# Patient Record
Sex: MALE | Race: WHITE | NOT HISPANIC OR LATINO | Employment: OTHER | URBAN - METROPOLITAN AREA
[De-identification: names, ages, dates, MRNs, and addresses within clinical notes are randomized per-mention and may not be internally consistent; named-entity substitution may affect disease eponyms.]

---

## 2017-01-04 PROBLEM — D22.5 MELANOCYTIC NEVI OF TRUNK: Status: ACTIVE | Noted: 2017-01-04

## 2017-01-10 ENCOUNTER — HOSPITAL ENCOUNTER (OUTPATIENT)
Dept: LAB | Facility: MEDICAL CENTER | Age: 82
End: 2017-01-10
Attending: NURSE PRACTITIONER
Payer: COMMERCIAL

## 2017-01-10 LAB
INR PPP: 2.4 (ref 0.87–1.13)
PROTHROMBIN TIME: 26.9 SEC (ref 12–14.6)

## 2017-01-10 PROCEDURE — 85610 PROTHROMBIN TIME: CPT

## 2017-01-10 PROCEDURE — 36415 COLL VENOUS BLD VENIPUNCTURE: CPT

## 2017-01-11 ENCOUNTER — ANTICOAGULATION MONITORING (OUTPATIENT)
Dept: VASCULAR LAB | Facility: MEDICAL CENTER | Age: 82
End: 2017-01-11

## 2017-01-11 DIAGNOSIS — Z86.73 HX-TIA (TRANSIENT ISCHEMIC ATTACK): ICD-10-CM

## 2017-01-11 DIAGNOSIS — I48.0 PAROXYSMAL ATRIAL FIBRILLATION (HCC): ICD-10-CM

## 2017-01-11 NOTE — PROGRESS NOTES
OP Anticoagulation Telephone Note      Date: 1/11/2017  Anticoagulation Summary as of 1/11/2017     INR goal 2.0-3.0   Selected INR 2.40 (1/10/2017)   Maintenance plan 7.5 mg (5 mg x 1.5) on Mon, Wed, Fri; 5 mg (5 mg x 1) all other days   Weekly total 42.5 mg   No change documented Theresa Amanda, Med Ass't   Plan last modified Jean Ayala PHARMD (11/12/2015)   Next INR check 2/14/2017   Target end date Indefinite    Indications   Paroxysmal atrial fibrillation (HCC) [I48.0]  Hx-TIA (transient ischemic attack) [Z86.73]         Anticoagulation Episode Summary     INR check location Outside Lab    Preferred lab     Send INR reminders to     [facility]      Anticoagulation Care Providers     Provider Role Specialty Phone number    Taz Jc M.D. Referring Cardiology [phone]    Jean Ayala, AZAM Responsible      COLIN [email or web address]. Responsible          Anticoagulation Patient Findings   Negatives Missed Doses, Extra Doses, Medication Changes, Antibiotic Use, Diet Changes, Dental/Other Procedures, Hospitalization, Bleeding Gums, Nose Bleeds, Blood in Urine, Blood in Stool, Any Bruising, Other Complaints      Plan:  Spoke with patient on the phone. Patient is therapeutic today. Patient denies any changes in medications or diet. Patient denies any signs or symptoms of bleeding or clotting. Instructed patient to call clinic if any unusual bleeding or bruising occurs. Will continue dosing as outlined above. Will follow-up with patient in 5 weeks.    Theresa Amanda, Medical Assistant    1/20/2017    Concur with plan outlined above    Jean Ayala PharmD

## 2017-01-18 PROBLEM — D49.2 NEOPLASM OF UNSPECIFIED BEHAVIOR OF BONE, SOFT TISSUE, AND SKIN: Status: RESOLVED | Noted: 2017-01-04 | Resolved: 2017-01-18

## 2017-01-23 ENCOUNTER — HOSPITAL ENCOUNTER (OUTPATIENT)
Dept: LAB | Facility: MEDICAL CENTER | Age: 82
End: 2017-01-23
Attending: INTERNAL MEDICINE
Payer: COMMERCIAL

## 2017-01-23 DIAGNOSIS — I10 ESSENTIAL HYPERTENSION: ICD-10-CM

## 2017-01-23 DIAGNOSIS — N18.3 CKD (CHRONIC KIDNEY DISEASE), STAGE 3 (MODERATE): ICD-10-CM

## 2017-01-23 LAB
ANION GAP SERPL CALC-SCNC: 10 MMOL/L (ref 0–11.9)
BUN SERPL-MCNC: 35 MG/DL (ref 8–22)
CALCIUM SERPL-MCNC: 9.4 MG/DL (ref 8.5–10.5)
CHLORIDE SERPL-SCNC: 108 MMOL/L (ref 96–112)
CO2 SERPL-SCNC: 23 MMOL/L (ref 20–33)
CREAT SERPL-MCNC: 1.63 MG/DL (ref 0.5–1.4)
GLUCOSE SERPL-MCNC: 113 MG/DL (ref 65–99)
POTASSIUM SERPL-SCNC: 4.3 MMOL/L (ref 3.6–5.5)
SODIUM SERPL-SCNC: 141 MMOL/L (ref 135–145)

## 2017-01-23 PROCEDURE — 36415 COLL VENOUS BLD VENIPUNCTURE: CPT

## 2017-01-23 PROCEDURE — 80048 BASIC METABOLIC PNL TOTAL CA: CPT

## 2017-01-25 ENCOUNTER — OFFICE VISIT (OUTPATIENT)
Dept: MEDICAL GROUP | Facility: CLINIC | Age: 82
End: 2017-01-25
Payer: COMMERCIAL

## 2017-01-25 VITALS
HEART RATE: 79 BPM | DIASTOLIC BLOOD PRESSURE: 70 MMHG | SYSTOLIC BLOOD PRESSURE: 120 MMHG | RESPIRATION RATE: 18 BRPM | TEMPERATURE: 97.9 F | BODY MASS INDEX: 28.68 KG/M2 | WEIGHT: 168 LBS | OXYGEN SATURATION: 93 % | HEIGHT: 64 IN

## 2017-01-25 DIAGNOSIS — N28.89 RENAL MASS, LEFT: ICD-10-CM

## 2017-01-25 DIAGNOSIS — I10 ESSENTIAL HYPERTENSION: ICD-10-CM

## 2017-01-25 DIAGNOSIS — R73.9 HYPERGLYCEMIA: ICD-10-CM

## 2017-01-25 DIAGNOSIS — I48.0 PAROXYSMAL ATRIAL FIBRILLATION (HCC): ICD-10-CM

## 2017-01-25 DIAGNOSIS — N18.3 CKD (CHRONIC KIDNEY DISEASE), STAGE 3 (MODERATE): ICD-10-CM

## 2017-01-25 DIAGNOSIS — E55.9 VITAMIN D DEFICIENCY: ICD-10-CM

## 2017-01-25 PROCEDURE — G8432 DEP SCR NOT DOC, RNG: HCPCS | Performed by: INTERNAL MEDICINE

## 2017-01-25 PROCEDURE — G8420 CALC BMI NORM PARAMETERS: HCPCS | Performed by: INTERNAL MEDICINE

## 2017-01-25 PROCEDURE — 4040F PNEUMOC VAC/ADMIN/RCVD: CPT | Mod: 8P | Performed by: INTERNAL MEDICINE

## 2017-01-25 PROCEDURE — 99214 OFFICE O/P EST MOD 30 MIN: CPT | Performed by: INTERNAL MEDICINE

## 2017-01-25 PROCEDURE — 1101F PT FALLS ASSESS-DOCD LE1/YR: CPT | Performed by: INTERNAL MEDICINE

## 2017-01-25 PROCEDURE — G8484 FLU IMMUNIZE NO ADMIN: HCPCS | Performed by: INTERNAL MEDICINE

## 2017-01-25 PROCEDURE — 1036F TOBACCO NON-USER: CPT | Performed by: INTERNAL MEDICINE

## 2017-01-25 RX ORDER — HYDROCHLOROTHIAZIDE 25 MG/1
25 TABLET ORAL DAILY
COMMUNITY
End: 2017-01-25

## 2017-01-25 ASSESSMENT — PATIENT HEALTH QUESTIONNAIRE - PHQ9: CLINICAL INTERPRETATION OF PHQ2 SCORE: 0

## 2017-01-26 NOTE — PROGRESS NOTES
Subjective:  Primitivo is a 89 y.o. male with the following   Past Medical History   Diagnosis Date   • TIA (transient ischemic attack)    • Other dyspnea and respiratory abnormality    • Atrioventricular block, complete (CMS-HCC)    • Bell's palsy    • Personal history of venous thrombosis and embolism    • Personal history of malignant neoplasm of prostate    • Anticoagulation monitoring, special range    • HTN    • Sleep apnea      Cannot use CPAP machine.   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, radiation   • Cancer (CMS-HCC) 1991     prostate, surgery   • Other malignant lymphomas, unspecified site, extranodal and solid organ sites    • Paroxysmal atrial fibrillation (CMS-HCC)       Family History   Problem Relation Age of Onset   • Heart Disease Mother    • Heart Attack Mother    • Heart Disease Father    • Heart Attack Father      The patient is on the following medications:   Current outpatient prescriptions:   •  warfarin (COUMADIN) 5 MG Tab, Take 1 Tab by mouth every day., Disp: 100 Tab, Rfl: 2  •  losartan (COZAAR) 25 MG Tab, Take 1 Tab by mouth every day., Disp: 90 Tab, Rfl: 3  •  diltiazem CD (CARDIZEM CD) 240 MG CAPSULE SR 24 HR, Take 1 Cap by mouth every day., Disp: 90 Cap, Rfl: 3  •  vitamin D (CHOLECALCIFEROL) 1000 UNIT TABS, Take 4,000 Units by mouth every day., Disp: , Rfl:     HPI; Patient is here today for follow-up visit regarding his recent labs, currently on diltiazem and Coumadin for atrial fibrillation, history of pacemaker placement denies having palpitation, chest pain or bleeding, on  losartan and diltiazem  for hypertension denies having shortness of breath or fatigue, on vitamin D supplements for vitamin D deficiency denies having back pain. Patient also has had history of left renal mass diagnosed several years ago , preferred no intervention, patient remains  "asymptomatic.  ROS:  See HPI    Blood pressure 120/70, pulse 79, temperature 36.6 °C (97.9 °F), resp. rate 18, height 1.626 m (5' 4.02\"), weight 76.204 kg (168 lb), SpO2 93 %.on RA  Objective:  Patient is well appearing and in no acute distress.  Pharynx is clear.  Neck is soft and supple with no cervical or supraclavicular lymphadenopathy, thyromegaly or masses, no JVD.  Lungs clear to auscultation bilaterally with normal respiratory effort. Abdomen soft, non-tender on palpation,not distended. Heart regular rate and rhythm without murmur. Extremities without any clubbing, cyanosis, or edema.    Assessment and Plan:  1. Atrial fibrillation/ pacemaker placement; rate and rhythm control , on diltiazem 240 mg q. daily, currently is on Coumadin denies any bleeding, followup INR ;        Ref. Range 10/26/2016 13:57 11/30/2016 14:30 1/10/2017 13:53   PT Latest Ref Range: 12.0-14.6 sec 27.2 (H) 29.7 (H) 26.9 (H)   INR Latest Ref Range: 0.87-1.13  2.43 (H) 2.72 (H) 2.40 (H)       2. Hypertension ; blood pressure is stable on diltiazem 240 mg q. Daily and losartan 25 mg daily.       3.Left renal mass and lung nodules; diagnosed several years ago , patient  decided against any intervention, currently is asymptomatic.       4. CKD ; follow creatinine ,GFR;    Ref. Range 2/22/2016 11:59 7/19/2016 09:54 1/23/2017 13:47   Creatinine Latest Ref Range: 0.50-1.40 mg/dL 1.53 (H) 1.45 (H) 1.63 (H)   GFR If  Latest Ref Range: >60 mL/min/1.73 m 2 51 (A) 55 (A) 48 (A)   GFR If Non  Latest Ref Range: >60 mL/min/1.73 m 2 43 (A) 45 (A) 39 (A)         5. Vitamin D deficiency; on OTC vitamin D3- 4000 units q. daily .       6. Hyperglycemia;    Ref. Range 1/23/2017 13:47   Glucose Latest Ref Range: 65-99 mg/dL 113 (H)         Plan:      Patient is advised regarding preventive and supportive care, diet and lifestyle modification, daily walking ,exercise , increase fluid intake continue current medications and " monitor labs.   Please note that this dictation was created using voice recognition software. I have worked with consultants from the vendor as well as technical experts from [facility] to optimize the interface. I have made every reasonable attempt to correct obvious errors, but I expect that there are errors of grammar and possibly content that I did not discover before finalizing the note.

## 2017-02-15 ENCOUNTER — APPOINTMENT (OUTPATIENT)
Dept: LAB | Facility: MEDICAL CENTER | Age: 82
End: 2017-02-15
Attending: NURSE PRACTITIONER
Payer: COMMERCIAL

## 2017-02-15 DIAGNOSIS — I48.0 PAROXYSMAL ATRIAL FIBRILLATION (HCC): ICD-10-CM

## 2017-02-15 LAB
INR PPP: 2.69 (ref 0.87–1.13)
PROTHROMBIN TIME: 29.4 SEC (ref 12–14.6)

## 2017-02-15 PROCEDURE — 36415 COLL VENOUS BLD VENIPUNCTURE: CPT

## 2017-02-15 PROCEDURE — 85610 PROTHROMBIN TIME: CPT

## 2017-02-17 ENCOUNTER — ANTICOAGULATION MONITORING (OUTPATIENT)
Dept: VASCULAR LAB | Facility: MEDICAL CENTER | Age: 82
End: 2017-02-17

## 2017-02-17 DIAGNOSIS — Z86.73 HX-TIA (TRANSIENT ISCHEMIC ATTACK): ICD-10-CM

## 2017-02-17 DIAGNOSIS — I48.0 PAROXYSMAL ATRIAL FIBRILLATION (HCC): ICD-10-CM

## 2017-02-17 NOTE — PROGRESS NOTES
OP Anticoagulation Telephone Note    Date: 2/17/2017  Anticoagulation Summary as of 2/17/2017     INR goal 2.0-3.0   Selected INR 2.69 (2/15/2017)   Maintenance plan 7.5 mg (5 mg x 1.5) on Mon, Wed, Fri; 5 mg (5 mg x 1) all other days   Weekly total 42.5 mg   No change documented Rod Galvan Ass't   Plan last modified Jean Ayala PHARMD (11/12/2015)   Next INR check 3/22/2017   Target end date Indefinite    Indications   Paroxysmal atrial fibrillation (CMS-HCC) [I48.0]  Hx-TIA (transient ischemic attack) [Z86.73]         Anticoagulation Episode Summary     INR check location Outside Lab    Preferred lab     Send INR reminders to     [facility]      Anticoagulation Care Providers     Provider Role Specialty Phone number    Taz Jc M.D. Referring Cardiology [phone]    Jean Ayala PHARMD Responsible      Dora Root N.P. Responsible          Anticoagulation Patient Findings   Negatives Missed Doses, Extra Doses, Medication Changes, Antibiotic Use, Diet Changes, Dental/Other Procedures, Hospitalization, Bleeding Gums, Nose Bleeds, Blood in Urine, Blood in Stool, Any Bruising, Other Complaints      Plan:  Spoke with patient's daughter on the phone. Patient is therapeutic today. Patient denies any changes in medications or diet. Patient denies any signs or symptoms of bleeding or clotting. Instructed patient to call clinic if any unusual bleeding or bruising occurs. Will continue dosing as outlined above. Will follow-up with patient in 5 weeks.    Theresa Amanda, Medical Assistant    2/24/2017    Concur with plan outlined above    Jean Ayala PharmD

## 2017-03-23 ENCOUNTER — HOSPITAL ENCOUNTER (OUTPATIENT)
Dept: LAB | Facility: MEDICAL CENTER | Age: 82
End: 2017-03-23
Attending: INTERNAL MEDICINE
Payer: COMMERCIAL

## 2017-03-23 ENCOUNTER — HOSPITAL ENCOUNTER (OUTPATIENT)
Dept: LAB | Facility: MEDICAL CENTER | Age: 82
End: 2017-03-23
Attending: NURSE PRACTITIONER
Payer: COMMERCIAL

## 2017-03-23 DIAGNOSIS — I48.0 PAROXYSMAL ATRIAL FIBRILLATION (HCC): ICD-10-CM

## 2017-03-23 LAB
INR PPP: 2.63 (ref 0.87–1.13)
PROTHROMBIN TIME: 28.9 SEC (ref 12–14.6)

## 2017-03-23 PROCEDURE — 85610 PROTHROMBIN TIME: CPT

## 2017-03-23 PROCEDURE — 36415 COLL VENOUS BLD VENIPUNCTURE: CPT

## 2017-05-08 DIAGNOSIS — Z79.899 MEDICATION MANAGEMENT: ICD-10-CM

## 2017-05-08 RX ORDER — WARFARIN SODIUM 5 MG/1
5 TABLET ORAL DAILY
Qty: 100 TAB | Refills: 2 | Status: SHIPPED | OUTPATIENT
Start: 2017-05-08 | End: 2017-12-15 | Stop reason: SDUPTHER

## 2017-05-12 ENCOUNTER — HOSPITAL ENCOUNTER (OUTPATIENT)
Dept: LAB | Facility: MEDICAL CENTER | Age: 82
End: 2017-05-12
Attending: NURSE PRACTITIONER
Payer: COMMERCIAL

## 2017-05-12 LAB
INR PPP: 2.77 (ref 0.87–1.13)
PROTHROMBIN TIME: 30.1 SEC (ref 12–14.6)

## 2017-05-12 PROCEDURE — 85610 PROTHROMBIN TIME: CPT

## 2017-05-12 PROCEDURE — 36415 COLL VENOUS BLD VENIPUNCTURE: CPT

## 2017-05-16 ENCOUNTER — ANTICOAGULATION MONITORING (OUTPATIENT)
Dept: VASCULAR LAB | Facility: MEDICAL CENTER | Age: 82
End: 2017-05-16

## 2017-05-16 DIAGNOSIS — I48.0 PAROXYSMAL ATRIAL FIBRILLATION (HCC): ICD-10-CM

## 2017-05-16 DIAGNOSIS — Z86.73 HX-TIA (TRANSIENT ISCHEMIC ATTACK): ICD-10-CM

## 2017-05-16 NOTE — PROGRESS NOTES
OP Anticoagulation Telephone Note    Date: 5/16/2017  Anticoagulation Summary as of 5/16/2017     INR goal 2.0-3.0   Selected INR 2.77 (5/12/2017)   Maintenance plan 7.5 mg (5 mg x 1.5) on Mon, Wed, Fri; 5 mg (5 mg x 1) all other days   Weekly total 42.5 mg   No change documented Theresa Amanda, Med Ass't   Plan last modified Jean Ayala PHARMD (11/12/2015)   Next INR check 6/16/2017   Target end date Indefinite    Indications   Paroxysmal atrial fibrillation (CMS-HCC) [I48.0]  Hx-TIA (transient ischemic attack) [Z86.73]         Anticoagulation Episode Summary     INR check location Outside Lab    Preferred lab     Send INR reminders to     [facility]      Anticoagulation Care Providers     Provider Role Specialty Phone number    Taz Jc M.D. Referring Cardiology [phone]    Jean Ayala PHARMD Responsible          Anticoagulation Patient Findings   Negatives Missed Doses, Extra Doses, Medication Changes, Antibiotic Use, Diet Changes, Dental/Other Procedures, Hospitalization, Bleeding Gums, Nose Bleeds, Blood in Urine, Blood in Stool, Any Bruising, Other Complaints      Plan:  Spoke with patient on the phone. Patient is therapeutic today. Patient denies any changes in medications or diet. Patient denies any signs or symptoms of bleeding or clotting. Instructed patient to call clinic if any unusual bleeding or bruising occurs. Will continue dosing as outlined above. Will follow-up with patient in 5 weeks.    Theresa Amanda, Medical Assistant    5/17/2017    Concur with plan outlined above    Jean Ayala PharmD

## 2017-05-19 ENCOUNTER — NON-PROVIDER VISIT (OUTPATIENT)
Dept: CARDIOLOGY | Facility: MEDICAL CENTER | Age: 82
End: 2017-05-19
Payer: COMMERCIAL

## 2017-05-19 ENCOUNTER — OFFICE VISIT (OUTPATIENT)
Dept: CARDIOLOGY | Facility: MEDICAL CENTER | Age: 82
End: 2017-05-19
Payer: COMMERCIAL

## 2017-05-19 VITALS
WEIGHT: 169 LBS | OXYGEN SATURATION: 90 % | HEART RATE: 82 BPM | HEIGHT: 64 IN | BODY MASS INDEX: 28.85 KG/M2 | DIASTOLIC BLOOD PRESSURE: 80 MMHG | SYSTOLIC BLOOD PRESSURE: 110 MMHG

## 2017-05-19 DIAGNOSIS — I48.0 PAROXYSMAL ATRIAL FIBRILLATION (HCC): ICD-10-CM

## 2017-05-19 DIAGNOSIS — Z95.0 CARDIAC PACEMAKER IN SITU: ICD-10-CM

## 2017-05-19 DIAGNOSIS — I44.2 AV BLOCK, 3RD DEGREE (HCC): ICD-10-CM

## 2017-05-19 DIAGNOSIS — I10 ESSENTIAL HYPERTENSION: ICD-10-CM

## 2017-05-19 DIAGNOSIS — Z79.01 LONG TERM CURRENT USE OF ANTICOAGULANT THERAPY: ICD-10-CM

## 2017-05-19 DIAGNOSIS — Z86.73 HX-TIA (TRANSIENT ISCHEMIC ATTACK): ICD-10-CM

## 2017-05-19 DIAGNOSIS — Z95.0 PRESENCE OF PERMANENT CARDIAC PACEMAKER: ICD-10-CM

## 2017-05-19 PROCEDURE — G8432 DEP SCR NOT DOC, RNG: HCPCS | Performed by: INTERNAL MEDICINE

## 2017-05-19 PROCEDURE — G8419 CALC BMI OUT NRM PARAM NOF/U: HCPCS | Performed by: INTERNAL MEDICINE

## 2017-05-19 PROCEDURE — 1101F PT FALLS ASSESS-DOCD LE1/YR: CPT | Performed by: INTERNAL MEDICINE

## 2017-05-19 PROCEDURE — 4040F PNEUMOC VAC/ADMIN/RCVD: CPT | Mod: 8P | Performed by: INTERNAL MEDICINE

## 2017-05-19 PROCEDURE — 1036F TOBACCO NON-USER: CPT | Performed by: INTERNAL MEDICINE

## 2017-05-19 PROCEDURE — 93280 PM DEVICE PROGR EVAL DUAL: CPT | Performed by: INTERNAL MEDICINE

## 2017-05-19 PROCEDURE — 99214 OFFICE O/P EST MOD 30 MIN: CPT | Performed by: INTERNAL MEDICINE

## 2017-05-19 NOTE — MR AVS SNAPSHOT
"        Primitivo Milan   2017 10:15 AM   Office Visit   MRN: [id number]    Department:  [facility]   Dept Phone:  [phone]    Description:  Male : 1921   Provider:  Bentley Marshall M.D.           Reason for Visit     Follow-Up           Allergies as of 2017     Allergen Noted Reactions    Iodine 2011         Vital Signs     Blood Pressure Pulse Height Weight Body Mass Index Oxygen Saturation    110/80 mmHg 82 1.626 m (5' 4.02\") 76.658 kg (169 lb) 28.99 kg/m2 90%    Smoking Status                   Never Smoker            Basic Information     Date Of Birth Sex Race Ethnicity Preferred Language    1921 Male White Non- English      Your appointments     2017 10:00 AM   Established Patient with Nathen Nicholson M.D.   [facility] (--)    [address]  [address]  [facility] [id number] [phone]           You will be receiving a confirmation call a few days before your appointment from our automated call confirmation system.              Problem List              ICD-10-CM Priority Class Noted - Resolved    Essential hypertension I10 High  Unknown - Present    Hx-TIA (transient ischemic attack) Z86.73 High  Unknown - Present    Paroxysmal atrial fibrillation (CMS-HCC) I48.0 High  Unknown - Present    Long term current use of anticoagulant therapy Z79.01 High  Unknown - Present    Kidney mass N28.89   Unknown - Present    Other dyspnea and respiratory abnormality R06.09, R09.89 Medium  Unknown - Present    Other malignant lymphomas, unspecified site, extranodal and solid organ sites C85.89   Unknown - Present    Atrioventricular block, complete (CMS-HCC) I44.2 High  Unknown - Present    History of TIA (transient ischemic attack) and stroke Z86.73 Medium  Unknown - Present    Presence of permanent cardiac pacemaker Z95.0 High  Unknown - Present    Personal history of venous thrombosis and embolism Z86.718 Medium  Unknown - Present   " Personal history of urinary calculi Z87.442   Unknown - Present    Other postprocedural status Z98.890   Unknown - Present    Personal history of malignant neoplasm of prostate Z85.46   Unknown - Present    Sick sinus syndrome (CMS-HCC) I49.5 High  3/15/2012 - Present    Aortic stenosis, mild I35.0   12/11/2012 - Present      Health Maintenance        Date Due Completion Dates    IMM DTaP/Tdap/Td Vaccine (1 - Tdap) 9/25/1940 ---    IMM ZOSTER VACCINE 9/25/1981 ---    IMM PNEUMOCOCCAL 65+ (ADULT) HIGH/HIGHEST RISK SERIES (1 of 2 - PCV13) 9/25/1986 ---    COLONOSCOPY 4/2/2023 4/2/2013 (N/S)    Override on 4/2/2013: (N/S) (never had one)            Current Immunizations     No immunizations on file.      Below and/or attached are the medications your provider expects you to take. Review all of your home medications and newly ordered medications with your provider and/or pharmacist. Follow medication instructions as directed by your provider and/or pharmacist. Please keep your medication list with you and share with your provider. Update the information when medications are discontinued, doses are changed, or new medications (including over-the-counter products) are added; and carry medication information at all times in the event of emergency situations     Allergies:  IODINE - (reactions not documented)               Medications  Valid as of: May 19, 2017 - 11:04 AM    Generic Name Brand Name Tablet Size Instructions for use    Cholecalciferol (Tab) cholecalciferol 1000 UNIT Take 4,000 Units by mouth every day.        DilTIAZem HCl Coated Beads (CAPSULE SR 24 HR) CARDIZEM  MG Take 1 Cap by mouth every day.        Losartan Potassium (Tab) COZAAR 25 MG Take 1 Tab by mouth every day.        Warfarin Sodium (Tab) COUMADIN 5 MG Take 1 Tab by mouth every day.        .                 Medicines prescribed today were sent to:     [id number] - [place] - [address] [place] [address]  [place] [address]    Phone: [phone] Fax: [phone]    Open 24 Hours?: No      Medication refill instructions:       If your prescription bottle indicates you have medication refills left, it is not necessary to call your provider’s office. Please contact your pharmacy and they will refill your medication.    If your prescription bottle indicates you do not have any refills left, you may request refills at any time through one of the following ways: The online [employer] system (except Urgent Care), by calling your provider’s office, or by asking your pharmacy to contact your provider’s office with a refill request. Medication refills are processed only during regular business hours and may not be available until the next business day. Your provider may request additional information or to have a follow-up visit with you prior to refilling your medication.   *Please Note: Medication refills are assigned a new Rx number when refilled electronically. Your pharmacy may indicate that no refills were authorized even though a new prescription for the same medication is available at the pharmacy. Please request the medicine by name with the pharmacy before contacting your provider for a refill.           [facility] [employer] Status: Patient Declined

## 2017-05-22 ASSESSMENT — ENCOUNTER SYMPTOMS
FALLS: 0
SHORTNESS OF BREATH: 0
FEVER: 0
PALPITATIONS: 0
COUGH: 0
WEAKNESS: 0
PND: 0
CLAUDICATION: 0
FOCAL WEAKNESS: 0
BLURRED VISION: 0
SORE THROAT: 0
BRUISES/BLEEDS EASILY: 0
HEADACHES: 0
BACK PAIN: 1
NAUSEA: 0
CHILLS: 0
LOSS OF CONSCIOUSNESS: 0
DIZZINESS: 0
ABDOMINAL PAIN: 0

## 2017-05-23 NOTE — PROGRESS NOTES
Subjective:   Primitivo Milan is a 89 y.o. male who presents today for follow-up of his history of paroxysmal atrial fibrillation with prior history of stroke in long-term pacemaker    He continues to do remarkably wellspecific issues over the last year    No issues with his anticoagulation    Past Medical History   Diagnosis Date   • TIA (transient ischemic attack)    • Other dyspnea and respiratory abnormality    • Atrioventricular block, complete (CMS-[facility])    • Bell's palsy    • Personal history of venous thrombosis and embolism    • Personal history of malignant neoplasm of prostate    • Anticoagulation monitoring, special range    • HTN    • Sleep apnea      Cannot use CPAP machine.   • Atrial fibrillation (CMS-[facility])    • Renal disorder 1970     stones   • Kidney mass      CT   • Stroke (CMS-[facility]) 1991   • Cancer (CMS-[facility]) 2001     left eye, radiation   • Cancer (CMS-[facility]) 1991     prostate, surgery   • Other malignant lymphomas, unspecified site, extranodal and solid organ sites    • Paroxysmal atrial fibrillation (CMS-[facility])      Past Surgical History   Procedure Laterality Date   • Lithotripsy  1995     left kidney x 3   • Prostatectomy, radical retro  1991   • Eye surgery  2006     left eye mass/ cancer/ s/p radiation   • Recovery  5/19/2011     Performed by SURGERY, IR-RECOVERY at SURGERY SAME DAY [facility] ORS   • Eye surgery     • Pacemaker insertion  September 2009     Medtronic Versa [id number] implanted by Dr. Lexa Lopes.     Family History   Problem Relation Age of Onset   • Heart Disease Mother    • Heart Attack Mother    • Heart Disease Father    • Heart Attack Father      History   Smoking status   • Never Smoker    Smokeless tobacco   • Never Used     Allergies   Allergen Reactions   • Iodine      Outpatient Encounter Prescriptions as of 5/19/2017   Medication Sig Dispense Refill   • warfarin (COUMADIN) 5 MG Tab Take 1 Tab by mouth every day. 100 Tab 2   • losartan (COZAAR) 25 MG Tab Take 1 Tab by  "mouth every day. 90 Tab 3   • diltiazem CD (CARDIZEM CD) 240 MG CAPSULE SR 24 HR Take 1 Cap by mouth every day. 90 Cap 3   • vitamin D (CHOLECALCIFEROL) 1000 UNIT TABS Take 4,000 Units by mouth every day.       No facility-administered encounter medications on file as of 5/19/2017.     Review of Systems   Constitutional: Negative for fever and chills.   HENT: Negative for sore throat.    Eyes: Negative for blurred vision.   Respiratory: Negative for cough and shortness of breath.    Cardiovascular: Negative for chest pain, palpitations, claudication, leg swelling and PND.   Gastrointestinal: Negative for nausea and abdominal pain.   Musculoskeletal: Positive for back pain and joint pain. Negative for falls.   Skin: Negative for rash.   Neurological: Negative for dizziness, focal weakness, loss of consciousness, weakness and headaches.   Endo/Heme/Allergies: Does not bruise/bleed easily.        Objective:   /80 mmHg  Pulse 82  Ht 1.626 m (5' 4.02\")  Wt 76.658 kg (169 lb)  BMI 28.99 kg/m2  SpO2 90%    Physical Exam   Constitutional: No distress.   HENT:   Mouth/Throat: Oropharynx is clear and moist.   Eyes: No scleral icterus.   Neck: Neck supple. No JVD present.   Cardiovascular: Normal rate, regular rhythm, normal heart sounds and intact distal pulses.  Exam reveals no gallop and no friction rub.    No murmur heard.  Pulmonary/Chest: Effort normal. He has no rales.   Abdominal: Soft. Bowel sounds are normal. There is no tenderness.   Musculoskeletal: He exhibits no edema.   Neurological: He is alert.   Skin: No rash noted. He is not diaphoretic.   Psychiatric: He has a normal mood and affect.       Assessment:     1. Essential hypertension     2. Hx-TIA (transient ischemic attack)     3. Long term current use of anticoagulant therapy     4. Paroxysmal atrial fibrillation (CMS-HCC)     5. Presence of permanent cardiac pacemaker         Medical Decision Making:  Today's Assessment / Status / Plan:     It " was my pleasure to meet with Mr. Milan.    He is accompanied by his daughter    He continues to do remarkably well tolerating his medication as well as pacemaker check today was perfectly fine    I will see Mr. Milan back in 1 year time and encouraged him to follow up with us over the phone or e-mail using my MyChart as issues arise.    It is my pleasure to participate in the care of Mr. Milan.  Please do not hesitate to contact me with questions or concerns.    Bentley Marshall MD PhD FAC  Cardiologist [facility]

## 2017-06-28 ENCOUNTER — TELEPHONE (OUTPATIENT)
Dept: VASCULAR LAB | Facility: MEDICAL CENTER | Age: 82
End: 2017-06-28

## 2017-06-28 NOTE — TELEPHONE ENCOUNTER
Called pt for INR reminder. Pt will go tomorrow. Denies any s/s bruising or bleeding.     Mikaela Cotter, PHARMD

## 2017-06-29 ENCOUNTER — HOSPITAL ENCOUNTER (OUTPATIENT)
Dept: LAB | Facility: MEDICAL CENTER | Age: 82
End: 2017-06-29
Attending: NURSE PRACTITIONER
Payer: COMMERCIAL

## 2017-06-29 DIAGNOSIS — I48.0 PAROXYSMAL ATRIAL FIBRILLATION (HCC): ICD-10-CM

## 2017-06-29 LAB
INR PPP: 2.94 (ref 0.87–1.13)
PROTHROMBIN TIME: 31.6 SEC (ref 12–14.6)

## 2017-06-29 PROCEDURE — 36415 COLL VENOUS BLD VENIPUNCTURE: CPT

## 2017-06-29 PROCEDURE — 85610 PROTHROMBIN TIME: CPT

## 2017-06-30 ENCOUNTER — ANTICOAGULATION MONITORING (OUTPATIENT)
Dept: VASCULAR LAB | Facility: MEDICAL CENTER | Age: 82
End: 2017-06-30

## 2017-06-30 DIAGNOSIS — I48.0 PAROXYSMAL ATRIAL FIBRILLATION (HCC): ICD-10-CM

## 2017-06-30 DIAGNOSIS — Z86.73 HX-TIA (TRANSIENT ISCHEMIC ATTACK): ICD-10-CM

## 2017-06-30 NOTE — PROGRESS NOTES
Anticoagulation Summary as of 6/30/2017     INR goal 2.0-3.0   Selected INR 2.94 (6/29/2017)   Maintenance plan 7.5 mg (5 mg x 1.5) on Mon, Wed, Fri; 5 mg (5 mg x 1) all other days   Weekly total 42.5 mg   Plan last modified Jean Ayala PHARMD (11/12/2015)   Next INR check 8/18/2017   Target end date Indefinite    Indications   Paroxysmal atrial fibrillation (CMS-HCC) [I48.0]  Hx-TIA (transient ischemic attack) [Z86.73]         Anticoagulation Episode Summary     INR check location Outside Lab    Preferred lab     Send INR reminders to     [facility]      Anticoagulation Care Providers     Provider Role Specialty Phone number    Taz Jc M.D. Referring Cardiology [phone]    ROSAMARIA MackenzieD Responsible          Anticoagulation Patient Findings   Negatives Missed Doses, Extra Doses, Medication Changes, Antibiotic Use, Diet Changes, Dental/Other Procedures, Hospitalization, Bleeding Gums, Nose Bleeds, Blood in Urine, Blood in Stool, Any Bruising, Other Complaints        Spoke with patient today regarding therapeutic INR of 2.94.  Patient denies any signs/symptoms of bruising or bleeding or any changes in diet and medications.  Instructed patient to call clinic with any questions or concerns.  Pt is to continue with current warfarin dosing regimen.  Follow up in 7 weeks.    Sánchez Ochoa, ROSAMARIAD

## 2017-07-20 ENCOUNTER — HOSPITAL ENCOUNTER (OUTPATIENT)
Dept: LAB | Facility: MEDICAL CENTER | Age: 82
End: 2017-07-20
Attending: INTERNAL MEDICINE
Payer: COMMERCIAL

## 2017-07-20 DIAGNOSIS — N18.3 CKD (CHRONIC KIDNEY DISEASE), STAGE 3 (MODERATE): ICD-10-CM

## 2017-07-20 DIAGNOSIS — E55.9 VITAMIN D DEFICIENCY: ICD-10-CM

## 2017-07-20 DIAGNOSIS — R73.9 HYPERGLYCEMIA: ICD-10-CM

## 2017-07-20 LAB
25(OH)D3 SERPL-MCNC: 26 NG/ML (ref 30–100)
CREAT SERPL-MCNC: 1.44 MG/DL (ref 0.5–1.4)
EST. AVERAGE GLUCOSE BLD GHB EST-MCNC: 114 MG/DL
GFR SERPL CREATININE-BSD FRML MDRD: 46 ML/MIN/1.73 M 2
HBA1C MFR BLD: 5.6 % (ref 0–5.6)

## 2017-07-20 PROCEDURE — 36415 COLL VENOUS BLD VENIPUNCTURE: CPT

## 2017-07-20 PROCEDURE — 82306 VITAMIN D 25 HYDROXY: CPT

## 2017-07-20 PROCEDURE — 82565 ASSAY OF CREATININE: CPT

## 2017-07-20 PROCEDURE — 83036 HEMOGLOBIN GLYCOSYLATED A1C: CPT | Mod: GA

## 2017-07-26 ENCOUNTER — OFFICE VISIT (OUTPATIENT)
Dept: MEDICAL GROUP | Facility: PHYSICIAN GROUP | Age: 82
End: 2017-07-26
Payer: COMMERCIAL

## 2017-07-26 VITALS
OXYGEN SATURATION: 92 % | BODY MASS INDEX: 29.02 KG/M2 | TEMPERATURE: 98 F | DIASTOLIC BLOOD PRESSURE: 82 MMHG | SYSTOLIC BLOOD PRESSURE: 138 MMHG | HEART RATE: 70 BPM | HEIGHT: 64 IN | WEIGHT: 170 LBS | RESPIRATION RATE: 16 BRPM

## 2017-07-26 DIAGNOSIS — R73.9 HYPERGLYCEMIA: ICD-10-CM

## 2017-07-26 DIAGNOSIS — I48.0 PAROXYSMAL ATRIAL FIBRILLATION (HCC): ICD-10-CM

## 2017-07-26 DIAGNOSIS — I10 ESSENTIAL HYPERTENSION: ICD-10-CM

## 2017-07-26 DIAGNOSIS — N28.89 KIDNEY MASS: ICD-10-CM

## 2017-07-26 DIAGNOSIS — E55.9 VITAMIN D DEFICIENCY: ICD-10-CM

## 2017-07-26 PROCEDURE — 99214 OFFICE O/P EST MOD 30 MIN: CPT | Performed by: INTERNAL MEDICINE

## 2017-07-26 ASSESSMENT — PAIN SCALES - GENERAL: PAINLEVEL: NO PAIN

## 2017-07-26 NOTE — MR AVS SNAPSHOT
"        Primitivo Swansondonta   2017 10:00 AM   Office Visit   MRN: [id number]    Department:  [facility]   Dept Phone:  [phone]    Description:  Male : 1921   Provider:  Nathen Nicholson M.D.           Reason for Visit     Follow-Up 6 Month Follow UP       Allergies as of 2017     Allergen Noted Reactions    Iodine 2011         You were diagnosed with     Essential hypertension   [8242141]       Paroxysmal atrial fibrillation (CMS-HCC)   [326095]       Kidney mass   [263639]       Vitamin D deficiency   [0436860]         Vital Signs     Blood Pressure Pulse Temperature Respirations Height Weight    138/82 mmHg 70 36.7 °C (98 °F) 16 1.626 m (5' 4.02\") 77.111 kg (170 lb)    Body Mass Index Oxygen Saturation Smoking Status             29.17 kg/m2 92% Never Smoker          Basic Information     Date Of Birth Sex Race Ethnicity Preferred Language    1921 Male White Non- English      Your appointments     2017 10:15 AM   PACER CHECK ONLY with PACER CHECK-CAM B 2   [facility] (--)    [address], [address]  [facility] [address]   [phone]              Problem List              ICD-10-CM Priority Class Noted - Resolved    Essential hypertension I10 High  Unknown - Present    Hx-TIA (transient ischemic attack) Z86.73 High  Unknown - Present    Paroxysmal atrial fibrillation (CMS-HCC) I48.0 High  Unknown - Present    Long term current use of anticoagulant therapy Z79.01 High  Unknown - Present    Kidney mass N28.89   Unknown - Present    Other dyspnea and respiratory abnormality R06.09, R09.89 Medium  Unknown - Present    Other malignant lymphomas, unspecified site, extranodal and solid organ sites C85.89   Unknown - Present    Atrioventricular block, complete (CMS-HCC) I44.2 High  Unknown - Present    History of TIA (transient ischemic attack) and stroke Z86.73 Medium  Unknown - Present    Presence of permanent cardiac pacemaker " Z95.0 High  Unknown - Present    Personal history of venous thrombosis and embolism Z86.718 Medium  Unknown - Present    Personal history of urinary calculi Z87.442   Unknown - Present    Other postprocedural status Z98.890   Unknown - Present    Personal history of malignant neoplasm of prostate Z85.46   Unknown - Present    Sick sinus syndrome (CMS-HCC) I49.5 High  3/15/2012 - Present    Aortic stenosis, mild I35.0   12/11/2012 - Present    Vitamin D deficiency E55.9   7/26/2017 - Present      Health Maintenance        Date Due Completion Dates    IMM DTaP/Tdap/Td Vaccine (1 - Tdap) 9/25/1940 ---    IMM ZOSTER VACCINE 9/25/1981 ---    IMM PNEUMOCOCCAL 65+ (ADULT) HIGH/HIGHEST RISK SERIES (1 of 2 - PCV13) 9/25/1986 ---    IMM INFLUENZA (1) 9/1/2017 ---    COLONOSCOPY 4/2/2023 4/2/2013 (N/S)    Override on 4/2/2013: (N/S) (never had one)            Current Immunizations     No immunizations on file.      Below and/or attached are the medications your provider expects you to take. Review all of your home medications and newly ordered medications with your provider and/or pharmacist. Follow medication instructions as directed by your provider and/or pharmacist. Please keep your medication list with you and share with your provider. Update the information when medications are discontinued, doses are changed, or new medications (including over-the-counter products) are added; and carry medication information at all times in the event of emergency situations     Allergies:  IODINE - (reactions not documented)               Medications  Valid as of: July 26, 2017 - 10:29 AM    Generic Name Brand Name Tablet Size Instructions for use    Cholecalciferol (Tab) cholecalciferol 1000 UNIT Take 4,000 Units by mouth every day.        DilTIAZem HCl Coated Beads (CAPSULE SR 24 HR) CARDIZEM  MG TAKE 1 CAP BY MOUTH EVERY DAY.        Losartan Potassium (Tab) COZAAR 25 MG Take 1 Tab by mouth every day.        Warfarin Sodium  (Tab) COUMADIN 5 MG Take 1 Tab by mouth every day.        .                 Medicines prescribed today were sent to:     [facility] #[id number] - [place], [place] - [address] AT [address] [place] [address]    Phone: [phone] Fax: [phone]    Open 24 Hours?: No      Medication refill instructions:       If your prescription bottle indicates you have medication refills left, it is not necessary to call your provider’s office. Please contact your pharmacy and they will refill your medication.    If your prescription bottle indicates you do not have any refills left, you may request refills at any time through one of the following ways: The online [employer] system (except Urgent Care), by calling your provider’s office, or by asking your pharmacy to contact your provider’s office with a refill request. Medication refills are processed only during regular business hours and may not be available until the next business day. Your provider may request additional information or to have a follow-up visit with you prior to refilling your medication.   *Please Note: Medication refills are assigned a new Rx number when refilled electronically. Your pharmacy may indicate that no refills were authorized even though a new prescription for the same medication is available at the pharmacy. Please request the medicine by name with the pharmacy before contacting your provider for a refill.        Your To Do List     Future Labs/Procedures Complete By Expires    CREATININE  As directed 7/26/2018    POTASSIUM SERUM (K)  As directed 7/26/2018    VITAMIN D,25 HYDROXY  As directed 7/26/2018         [employer] Status: Patient Declined

## 2017-07-26 NOTE — PROGRESS NOTES
Subjective:  Primitivo is a 89 y.o. male with the following   Past Medical History   Diagnosis Date   • TIA (transient ischemic attack)    • Other dyspnea and respiratory abnormality    • Atrioventricular block, complete (CMS-HCC)    • Bell's palsy    • Personal history of venous thrombosis and embolism    • Personal history of malignant neoplasm of prostate    • Anticoagulation monitoring, special range    • HTN    • Sleep apnea      Cannot use CPAP machine.   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, radiation   • Cancer (CMS-HCC) 1991     prostate, surgery   • Other malignant lymphomas, unspecified site, extranodal and solid organ sites    • Paroxysmal atrial fibrillation (CMS-HCC)       Family History   Problem Relation Age of Onset   • Heart Disease Mother    • Heart Attack Mother    • Heart Disease Father    • Heart Attack Father      The patient is on the following medications:   Current outpatient prescriptions:   •  diltiazem CD (CARDIZEM CD) 240 MG CAPSULE SR 24 HR, TAKE 1 CAP BY MOUTH EVERY DAY., Disp: 90 Cap, Rfl: 3  •  warfarin (COUMADIN) 5 MG Tab, Take 1 Tab by mouth every day., Disp: 100 Tab, Rfl: 2  •  losartan (COZAAR) 25 MG Tab, Take 1 Tab by mouth every day., Disp: 90 Tab, Rfl: 3  •  vitamin D (CHOLECALCIFEROL) 1000 UNIT TABS, Take 4,000 Units by mouth every day., Disp: , Rfl:     HPI; Patient is here today for follow-up visit regarding his recent labs, currently on diltiazem and losartan for hypertension denies having chest pain, shortness of breath or fatigue, on warfarin for paroxysmal atrial fibrillation denies having palpitation or bleeding, on vitamin D supplements for vitamin D deficiency denies having back pain. Patient also has had history of left renal mass with lung nodules, decided against any invasive procedures, currently is asymptomatic.  ROS:  See HPI    Blood pressure  "138/82, pulse 70, temperature 36.7 °C (98 °F), resp. rate 16, height 1.626 m (5' 4.02\"), weight 77.111 kg (170 lb), SpO2 92 %.on RA  Objective:  Patient is well appearing and in no acute distress.  Pharynx is clear.  Neck is soft and supple with no cervical or supraclavicular lymphadenopathy, thyromegaly or masses, no JVD.  Lungs clear to auscultation bilaterally with normal respiratory effort. Abdomen soft, non-tender on palpation,not distended. Heart regular rate and rhythm without murmur. Extremities without any clubbing, cyanosis, or edema.    Assessment and Plan:  1. Paroxysmal Atrial fibrillation/ pacemaker placement; rate and rhythm control , on diltiazem 240 mg q. daily, currently is on Coumadin denies any bleeding, followup INR ;        Ref. Range 3/23/2017 15:20 5/12/2017 14:48 6/29/2017 14:20   INR Latest Ref Range: 0.87-1.13  2.63 (H) 2.77 (H) 2.94 (H)         2. Hypertension ; blood pressure is stable on diltiazem 240 mg q. Daily and losartan 25 mg daily.       3.Left renal mass and lung nodules; diagnosed several years ago , patient  decided against any intervention, currently is asymptomatic.       4. CKD ; follow creatinine ,GFR;    Ref. Range 7/19/2016 09:54 1/23/2017 13:47 7/20/2017 15:21   Creatinine Latest Ref Range: 0.50-1.40 mg/dL 1.45 (H) 1.63 (H) 1.44 (H)   GFR If  Latest Ref Range: >60 mL/min/1.73 m 2 55 (A) 48 (A) 55 (A)   GFR If Non  Latest Ref Range: >60 mL/min/1.73 m 2 45 (A) 39 (A) 46 (A)           5. Vitamin D deficiency; on OTC vitamin D3 daily.     Ref. Range 3/27/2013 10:22 11/25/2013 13:29 7/20/2017 15:21   25-Hydroxy   Vitamin D 25 Latest Ref Range:  ng/mL 56 37 26 (L)       6. Hyperglycemia;    Ref. Range 7/20/2017 15:21   Glycohemoglobin Latest Ref Range: 0.0-5.6 % 5.6           Plan:       Patient is advised regarding preventive and supportive care, diet and lifestyle modification, daily walking ,exercise , increase fluid intake, vitamin D3 " - 5000 units plus magnesium as instructed,  continue current medications and monitor labs.    Please note that this dictation was created using voice recognition software. I have worked with consultants from the vendor as well as technical experts from [facility] to optimize the interface. I have made every reasonable attempt to correct obvious errors, but I expect that there are errors of grammar and possibly content that I did not discover before finalizing the note.

## 2017-08-17 ENCOUNTER — HOSPITAL ENCOUNTER (OUTPATIENT)
Dept: LAB | Facility: MEDICAL CENTER | Age: 82
End: 2017-08-17
Attending: INTERNAL MEDICINE
Payer: COMMERCIAL

## 2017-08-17 LAB
INR PPP: 2.62 (ref 0.87–1.13)
PROTHROMBIN TIME: 28.8 SEC (ref 12–14.6)

## 2017-08-17 PROCEDURE — 85610 PROTHROMBIN TIME: CPT

## 2017-08-17 PROCEDURE — 36415 COLL VENOUS BLD VENIPUNCTURE: CPT

## 2017-08-21 ENCOUNTER — ANTICOAGULATION MONITORING (OUTPATIENT)
Dept: VASCULAR LAB | Facility: MEDICAL CENTER | Age: 82
End: 2017-08-21

## 2017-08-21 DIAGNOSIS — I48.0 PAROXYSMAL ATRIAL FIBRILLATION (HCC): ICD-10-CM

## 2017-08-21 DIAGNOSIS — Z86.73 HX-TIA (TRANSIENT ISCHEMIC ATTACK): ICD-10-CM

## 2017-08-21 NOTE — PROGRESS NOTES
Anticoagulation Summary as of 8/21/2017     INR goal 2.0-3.0   Selected INR 2.62 (8/17/2017)   Maintenance plan 7.5 mg (5 mg x 1.5) on Mon, Wed, Fri; 5 mg (5 mg x 1) all other days   Weekly total 42.5 mg   No change documented Piedad Perez, Med Ass't   Plan last modified Jean Ayala PHARMD (11/12/2015)   Next INR check 10/6/2017   Target end date Indefinite    Indications   Paroxysmal atrial fibrillation (CMS-HCC) [I48.0]  Hx-TIA (transient ischemic attack) [Z86.73]         Anticoagulation Episode Summary     INR check location Outside Lab    Preferred lab     Send INR reminders to     [facility]      Anticoagulation Care Providers     Provider Role Specialty Phone number    Taz Jc M.D. Referring Cardiology [phone]    Jean Ayala PHARMD Responsible          Anticoagulation Patient Findings   Negatives Missed Doses, Extra Doses, Medication Changes, Antibiotic Use, Diet Changes, Dental/Other Procedures, Hospitalization, Bleeding Gums, Nose Bleeds, Blood in Urine, Blood in Stool, Any Bruising, Other Complaints        Left voicemail message to report therapeutic INR of 2.6.  Patient to continue with current warfarin dosing regimen. Requested pt contact the clinic for any change to diet or medication, and to report any signs or symptoms of bleeding, bruising or clotting.  Pt to follow up in 7 weeks.    Piedad Perez, Med Ass't  9/15/2017    Concur with plan outlined above    Jean Ayala PharmD

## 2017-08-30 ENCOUNTER — TELEPHONE (OUTPATIENT)
Dept: MEDICAL GROUP | Facility: PHYSICIAN GROUP | Age: 82
End: 2017-08-30

## 2017-08-30 ENCOUNTER — PATIENT OUTREACH (OUTPATIENT)
Dept: HEALTH INFORMATION MANAGEMENT | Facility: OTHER | Age: 82
End: 2017-08-30

## 2017-08-30 NOTE — PROGRESS NOTES
Outcome: Pt's daughter will give message to Primitivo. Requested call back.    WebIZ Checked & Epic Updated:  yes    [facility] Verified: no    Attempt # 1

## 2017-08-30 NOTE — TELEPHONE ENCOUNTER
Patient is over the recommended age and is showing overdue for Zoster, Tdap and pneumococcal vaccines in Health Maintenance.    Please reply to this message as to whether these tests are appropriate and I will update the Health Maintenance Topic or contact the patient to schedule.

## 2017-09-27 DIAGNOSIS — I10 ESSENTIAL HYPERTENSION: ICD-10-CM

## 2017-09-27 RX ORDER — LOSARTAN POTASSIUM 25 MG/1
25 TABLET ORAL
Qty: 90 TAB | Refills: 0 | Status: SHIPPED | OUTPATIENT
Start: 2017-09-27 | End: 2017-10-02 | Stop reason: SDUPTHER

## 2017-09-28 ENCOUNTER — HOSPITAL ENCOUNTER (OUTPATIENT)
Dept: LAB | Facility: MEDICAL CENTER | Age: 82
End: 2017-09-28
Attending: NURSE PRACTITIONER
Payer: COMMERCIAL

## 2017-09-28 DIAGNOSIS — I48.0 PAROXYSMAL ATRIAL FIBRILLATION (HCC): ICD-10-CM

## 2017-09-28 LAB
INR PPP: 2.86 (ref 0.87–1.13)
PROTHROMBIN TIME: 30.9 SEC (ref 12–14.6)

## 2017-09-28 PROCEDURE — 36415 COLL VENOUS BLD VENIPUNCTURE: CPT

## 2017-09-28 PROCEDURE — 85610 PROTHROMBIN TIME: CPT

## 2017-09-29 ENCOUNTER — ANTICOAGULATION MONITORING (OUTPATIENT)
Dept: VASCULAR LAB | Facility: MEDICAL CENTER | Age: 82
End: 2017-09-29

## 2017-09-29 DIAGNOSIS — I48.0 PAROXYSMAL ATRIAL FIBRILLATION (HCC): ICD-10-CM

## 2017-09-29 DIAGNOSIS — Z86.73 HX-TIA (TRANSIENT ISCHEMIC ATTACK): ICD-10-CM

## 2017-09-29 NOTE — PROGRESS NOTES
Anticoagulation Summary  As of 9/29/2017    INR goal:   2.0-3.0   TTR:   90.6 % (2.3 y)   Today's INR:   2.86 (9/28/2017)   Maintenance plan:   7.5 mg (5 mg x 1.5) on Mon, Wed, Fri; 5 mg (5 mg x 1) all other days   Weekly total:   42.5 mg   No change documented:   Piedad Perez, Med Ass't   Plan last modified:   Jean Ayala PharmD (11/12/2015)   Next INR check:   11/16/2017   Target end date:   Indefinite    Indications    Paroxysmal atrial fibrillation (CMS-HCC) [I48.0]  Hx-TIA (transient ischemic attack) [Z86.73]             Anticoagulation Episode Summary     INR check location:   Outside Lab    Preferred lab:       Send INR reminders to:       Comments:   [facility]      Anticoagulation Care Providers     Provider Role Specialty Phone number    Taz Jc M.D. Referring Cardiology [phone]    Jean Ayala, PharmD Responsible          Anticoagulation Patient Findings  Patient Findings     Negatives:   Signs/symptoms of thrombosis, Signs/symptoms of bleeding, Laboratory test error suspected, Change in health, Change in alcohol use, Change in activity, Upcoming invasive procedure, Emergency department visit, Upcoming dental procedure, Missed doses, Extra doses, Change in medications, Change in diet/appetite, Hospital admission, Bruising, Other complaints        Spoke with patient to report a therapeutic INR.  Pt instructed to continue with current warfarin dosing regimen. Pt denies any s/s of bleeding, bruising, clotting or any changes to diet or medication.  Will follow up in 7 weeks.    Piedad Perez, Med Ass't    10/2/2017    Concur with plan outlined above    Jean Ayala PharmD

## 2017-10-02 DIAGNOSIS — I10 ESSENTIAL HYPERTENSION: ICD-10-CM

## 2017-10-02 RX ORDER — LOSARTAN POTASSIUM 25 MG/1
25 TABLET ORAL
Qty: 90 TAB | Refills: 3 | Status: SHIPPED | OUTPATIENT
Start: 2017-10-02 | End: 2018-11-30 | Stop reason: SDUPTHER

## 2017-11-18 ENCOUNTER — HOSPITAL ENCOUNTER (OUTPATIENT)
Dept: LAB | Facility: MEDICAL CENTER | Age: 82
End: 2017-11-18
Attending: NURSE PRACTITIONER
Payer: COMMERCIAL

## 2017-11-18 DIAGNOSIS — I48.0 PAROXYSMAL ATRIAL FIBRILLATION (HCC): ICD-10-CM

## 2017-11-18 LAB
INR PPP: 3.75 (ref 0.87–1.13)
PROTHROMBIN TIME: 36.8 SEC (ref 12–14.6)

## 2017-11-18 PROCEDURE — 36415 COLL VENOUS BLD VENIPUNCTURE: CPT

## 2017-11-18 PROCEDURE — 85610 PROTHROMBIN TIME: CPT

## 2017-11-20 ENCOUNTER — ANTICOAGULATION MONITORING (OUTPATIENT)
Dept: VASCULAR LAB | Facility: MEDICAL CENTER | Age: 82
End: 2017-11-20

## 2017-11-20 ENCOUNTER — NON-PROVIDER VISIT (OUTPATIENT)
Dept: CARDIOLOGY | Facility: MEDICAL CENTER | Age: 82
End: 2017-11-20
Payer: COMMERCIAL

## 2017-11-20 ENCOUNTER — APPOINTMENT (RX ONLY)
Dept: URBAN - METROPOLITAN AREA CLINIC 4 | Facility: CLINIC | Age: 82
Setting detail: DERMATOLOGY
End: 2017-11-20

## 2017-11-20 DIAGNOSIS — Z95.0 PRESENCE OF PERMANENT CARDIAC PACEMAKER: ICD-10-CM

## 2017-11-20 DIAGNOSIS — I48.0 PAROXYSMAL ATRIAL FIBRILLATION (HCC): ICD-10-CM

## 2017-11-20 DIAGNOSIS — D18.0 HEMANGIOMA: ICD-10-CM

## 2017-11-20 DIAGNOSIS — I44.2 ATRIOVENTRICULAR BLOCK, COMPLETE (HCC): ICD-10-CM

## 2017-11-20 DIAGNOSIS — Z86.73 HX-TIA (TRANSIENT ISCHEMIC ATTACK): ICD-10-CM

## 2017-11-20 DIAGNOSIS — D22 MELANOCYTIC NEVI: ICD-10-CM

## 2017-11-20 DIAGNOSIS — L81.4 OTHER MELANIN HYPERPIGMENTATION: ICD-10-CM

## 2017-11-20 DIAGNOSIS — Z85.820 PERSONAL HISTORY OF MALIGNANT MELANOMA OF SKIN: ICD-10-CM

## 2017-11-20 DIAGNOSIS — L57.0 ACTINIC KERATOSIS: ICD-10-CM

## 2017-11-20 DIAGNOSIS — L82.1 OTHER SEBORRHEIC KERATOSIS: ICD-10-CM

## 2017-11-20 PROBLEM — D48.5 NEOPLASM OF UNCERTAIN BEHAVIOR OF SKIN: Status: ACTIVE | Noted: 2017-11-20

## 2017-11-20 PROBLEM — D22.39 MELANOCYTIC NEVI OF OTHER PARTS OF FACE: Status: ACTIVE | Noted: 2017-11-20

## 2017-11-20 PROBLEM — D18.01 HEMANGIOMA OF SKIN AND SUBCUTANEOUS TISSUE: Status: ACTIVE | Noted: 2017-11-20

## 2017-11-20 PROBLEM — D22.62 MELANOCYTIC NEVI OF LEFT UPPER LIMB, INCLUDING SHOULDER: Status: ACTIVE | Noted: 2017-11-20

## 2017-11-20 PROBLEM — D22.5 MELANOCYTIC NEVI OF TRUNK: Status: ACTIVE | Noted: 2017-11-20

## 2017-11-20 PROBLEM — D22.61 MELANOCYTIC NEVI OF RIGHT UPPER LIMB, INCLUDING SHOULDER: Status: ACTIVE | Noted: 2017-11-20

## 2017-11-20 PROBLEM — Z85.828 PERSONAL HISTORY OF OTHER MALIGNANT NEOPLASM OF SKIN: Status: ACTIVE | Noted: 2017-11-20

## 2017-11-20 PROCEDURE — ? BIOPSY BY SHAVE METHOD

## 2017-11-20 PROCEDURE — 17004 DESTROY PREMAL LESIONS 15/>: CPT

## 2017-11-20 PROCEDURE — 93280 PM DEVICE PROGR EVAL DUAL: CPT | Performed by: INTERNAL MEDICINE

## 2017-11-20 PROCEDURE — 99213 OFFICE O/P EST LOW 20 MIN: CPT | Mod: 25

## 2017-11-20 PROCEDURE — ? LIQUID NITROGEN

## 2017-11-20 PROCEDURE — ? COUNSELING

## 2017-11-20 PROCEDURE — ? SUNSCREEN RECOMMENDATIONS

## 2017-11-20 PROCEDURE — 11100: CPT | Mod: 59

## 2017-11-20 ASSESSMENT — LOCATION ZONE DERM
LOCATION ZONE: SCALP
LOCATION ZONE: NECK
LOCATION ZONE: ARM
LOCATION ZONE: EAR
LOCATION ZONE: HAND
LOCATION ZONE: FACE
LOCATION ZONE: TRUNK

## 2017-11-20 ASSESSMENT — LOCATION DETAILED DESCRIPTION DERM
LOCATION DETAILED: LEFT RADIAL DORSAL HAND
LOCATION DETAILED: LEFT SUPERIOR HELIX
LOCATION DETAILED: LEFT INFERIOR LATERAL FOREHEAD
LOCATION DETAILED: LEFT SUPERIOR MEDIAL FOREHEAD
LOCATION DETAILED: LEFT FOREHEAD
LOCATION DETAILED: RIGHT SUPERIOR UPPER BACK
LOCATION DETAILED: LEFT SUPERIOR MEDIAL UPPER BACK
LOCATION DETAILED: LEFT MID-UPPER BACK
LOCATION DETAILED: RIGHT SUPERIOR LATERAL MALAR CHEEK
LOCATION DETAILED: LEFT DISTAL DORSAL FOREARM
LOCATION DETAILED: MID TRAPEZIAL NECK
LOCATION DETAILED: LEFT LATERAL MALAR CHEEK
LOCATION DETAILED: LEFT PROXIMAL DORSAL FOREARM
LOCATION DETAILED: RIGHT LATERAL SUPERIOR CHEST
LOCATION DETAILED: RIGHT MID-UPPER BACK
LOCATION DETAILED: LEFT INFERIOR CENTRAL MALAR CHEEK
LOCATION DETAILED: STERNAL NOTCH
LOCATION DETAILED: RIGHT LATERAL FOREHEAD
LOCATION DETAILED: RIGHT DISTAL DORSAL FOREARM
LOCATION DETAILED: LEFT SUPERIOR LATERAL BUCCAL CHEEK
LOCATION DETAILED: LEFT INFERIOR POSTAURICULAR SKIN
LOCATION DETAILED: RIGHT SUPERIOR FOREHEAD
LOCATION DETAILED: LEFT MEDIAL UPPER BACK
LOCATION DETAILED: RIGHT PROXIMAL DORSAL FOREARM

## 2017-11-20 ASSESSMENT — LOCATION SIMPLE DESCRIPTION DERM
LOCATION SIMPLE: LEFT FOREHEAD
LOCATION SIMPLE: LEFT FOREARM
LOCATION SIMPLE: SCALP
LOCATION SIMPLE: RIGHT UPPER BACK
LOCATION SIMPLE: LEFT HAND
LOCATION SIMPLE: LEFT EAR
LOCATION SIMPLE: RIGHT FOREARM
LOCATION SIMPLE: LEFT CHEEK
LOCATION SIMPLE: CHEST
LOCATION SIMPLE: RIGHT FOREHEAD
LOCATION SIMPLE: TRAPEZIAL NECK
LOCATION SIMPLE: RIGHT CHEEK
LOCATION SIMPLE: LEFT UPPER BACK

## 2017-11-20 NOTE — PROCEDURE: BIOPSY BY SHAVE METHOD
Electrodesiccation Text: The wound bed was treated with electrodesiccation after the biopsy was performed.
Type Of Destruction Used: Curettage
Biopsy Method: Personna blade
Anesthesia Volume In Cc: 0.5
Notification Instructions: Patient will be notified of biopsy results. However, patient instructed to call the office if not contacted within 2 weeks.
Curettage Text: The wound bed was treated with curettage after the biopsy was performed.
Destruction After The Procedure: No
Lab Facility: 
Lab: [id number]
Additional Anesthesia Volume In Cc (Will Not Render If 0): 0
Electrodesiccation And Curettage Text: The wound bed was treated with electrodesiccation and curettage after the biopsy was performed.
Silver Nitrate Text: The wound bed was treated with silver nitrate after the biopsy was performed.
Anesthesia Type: 1% lidocaine with epinephrine
Wound Care: Bacitracin
Hemostasis: Drysol
Post-Care Instructions: I reviewed with the patient in detail post-care instructions. Patient is to keep the biopsy site dry overnight, and then apply bacitracin twice daily until healed. Patient may apply hydrogen peroxide soaks to remove any crusting.
Detail Level: Detailed
Cryotherapy Text: The wound bed was treated with cryotherapy after the biopsy was performed.
Consent: Written consent was obtained and risks were reviewed including but not limited to scarring, infection, bleeding, scabbing, incomplete removal, nerve damage and allergy to anesthesia.
Biopsy Type: H and E
Dressing: bandage
Billing Type: Third-Party Bill

## 2017-11-20 NOTE — PROGRESS NOTES
Anticoagulation Summary  As of 11/20/2017    INR goal:   2.0-3.0   TTR:   86.4 % (2.5 y)   Today's INR:   3.75! (11/18/2017)   Maintenance plan:   7.5 mg (5 mg x 1.5) on Mon, Wed, Fri; 5 mg (5 mg x 1) all other days   Weekly total:   42.5 mg   Plan last modified:   Jean Ayala PharmD (11/12/2015)   Next INR check:   12/4/2017   Target end date:   Indefinite    Indications    Paroxysmal atrial fibrillation (CMS-HCC) [I48.0]  Hx-TIA (transient ischemic attack) [Z86.73]             Anticoagulation Episode Summary     INR check location:   Outside Lab    Preferred lab:       Send INR reminders to:       Comments:   [facility]      Anticoagulation Care Providers     Provider Role Specialty Phone number    Taz Jc M.D. Referring Cardiology [phone]    Jean Ayala, PharmD Responsible          Spoke to patient on phone. No current signs of bleeding. No interval medication changes that might explain INR elevation. HOLD warfarin tomorrow, decrease dose to 5 mg on Wednesday, then resume previously prescribed regimen listed above. Follow up INR in 2 weeks.    Jean Ayala, MarionD

## 2017-11-20 NOTE — PROCEDURE: LIQUID NITROGEN
Number Of Freeze-Thaw Cycles: 1 freeze-thaw cycle
Render Post-Care Instructions In Note?: no
Consent: The patient's consent was obtained including but not limited to risks of crusting, scabbing, blistering, scarring, darker or lighter pigmentary change, recurrence, incomplete removal and infection.
Detail Level: Detailed
Post-Care Instructions: I reviewed with the patient in detail post-care instructions. Patient is to wear sunprotection, and avoid picking at any of the treated lesions. Pt may apply Vaseline to crusted or scabbing areas.
Duration Of Freeze Thaw-Cycle (Seconds): 5

## 2017-11-27 ENCOUNTER — OFFICE VISIT (OUTPATIENT)
Dept: URGENT CARE | Facility: PHYSICIAN GROUP | Age: 82
End: 2017-11-27
Payer: COMMERCIAL

## 2017-11-27 VITALS
TEMPERATURE: 97.6 F | RESPIRATION RATE: 20 BRPM | DIASTOLIC BLOOD PRESSURE: 88 MMHG | HEIGHT: 64 IN | HEART RATE: 84 BPM | SYSTOLIC BLOOD PRESSURE: 154 MMHG | WEIGHT: 170 LBS | BODY MASS INDEX: 29.02 KG/M2 | OXYGEN SATURATION: 94 %

## 2017-11-27 DIAGNOSIS — T14.8XXA ABRASION OF SKIN: ICD-10-CM

## 2017-11-27 PROCEDURE — 99213 OFFICE O/P EST LOW 20 MIN: CPT | Performed by: PHYSICIAN ASSISTANT

## 2017-11-27 ASSESSMENT — ENCOUNTER SYMPTOMS
FEVER: 0
DIARRHEA: 0
DIZZINESS: 0
MUSCULOSKELETAL NEGATIVE: 1
SHORTNESS OF BREATH: 0
CHILLS: 0
VOMITING: 0
NAUSEA: 0
ROS SKIN COMMENTS: POSITIVE FOR ABRASION
ABDOMINAL PAIN: 0

## 2017-11-27 NOTE — PROGRESS NOTES
"Subjective:      Primitivo Milan is a 89 y.o. male who presents with Laceration (noticed 3days ago.on L leg. redness, itchy)        Patient is accompanied by his daughter.     Laceration    The incident occurred 3 to 5 days ago (3 days). The laceration is located on the left leg. The laceration is 1 cm in size. The laceration mechanism is unknown.The pain is at a severity of 0/10. The patient is experiencing no pain. The pain has been improving since onset. He reports no foreign bodies present. His tetanus status is UTD.     Patient presents to urgent care reporting an abrasion on his leg x 3 days. He is unsure what caused the abrasion. He states he just wants to make sure it isn't infection. He states there was pain and redness around the area yesterday that has much improved today. No discharge noted.    Review of Systems   Constitutional: Negative for chills and fever.   HENT: Negative for congestion.    Respiratory: Negative for shortness of breath.    Cardiovascular: Negative for chest pain.   Gastrointestinal: Negative for abdominal pain, diarrhea, nausea and vomiting.   Genitourinary: Negative.    Musculoskeletal: Negative.    Skin: Negative for itching.        Positive for abrasion   Neurological: Negative for dizziness.        Objective:     /88   Pulse 84   Temp 36.4 °C (97.6 °F)   Resp 20   Ht 1.626 m (5' 4\")   Wt 77.1 kg (170 lb)   SpO2 94%   BMI 29.18 kg/m²      Physical Exam   Constitutional: He is oriented to person, place, and time. He appears well-developed and well-nourished. No distress.   HENT:   Head: Normocephalic and atraumatic.   Eyes: Conjunctivae are normal. Pupils are equal, round, and reactive to light.   Neck: Normal range of motion.   Cardiovascular: Normal rate.    Pulmonary/Chest: Effort normal.   Musculoskeletal: Normal range of motion.   Lymphadenopathy:     He has no cervical adenopathy.   Neurological: He is alert and oriented to person, place, and time.   Skin: " Skin is warm and dry. He is not diaphoretic.        Small, superficial, oval-shaped abrasion noted on anterior aspect of left lower extremity. Small amount of surrounding erythema noted without edema, TTP, or discharge from the site. No streaking or +hotness to palpation.    Psychiatric: He has a normal mood and affect. His behavior is normal.   Nursing note and vitals reviewed.         PMH:  has a past medical history of Anticoagulation monitoring, special range; Atrial fibrillation (CMS-HCC); Atrioventricular block, complete (CMS-HCC); Bell's palsy; Cancer (CMS-HCC) (2001); Cancer (CMS-[facility]) (1991); HTN; Kidney mass; Other dyspnea and respiratory abnormality; Other malignant lymphomas, unspecified site, extranodal and solid organ sites; Paroxysmal atrial fibrillation (CMS-HCC); Personal history of malignant neoplasm of prostate; Personal history of venous thrombosis and embolism; Renal disorder (1970); Sleep apnea; Stroke (CMS-HCC) (1991); and TIA (transient ischemic attack).  MEDS:   Current Outpatient Prescriptions:   •  losartan (COZAAR) 25 MG Tab, Take 1 Tab by mouth every day., Disp: 90 Tab, Rfl: 3  •  diltiazem CD (CARDIZEM CD) 240 MG CAPSULE SR 24 HR, TAKE 1 CAP BY MOUTH EVERY DAY., Disp: 90 Cap, Rfl: 3  •  warfarin (COUMADIN) 5 MG Tab, Take 1 Tab by mouth every day., Disp: 100 Tab, Rfl: 2  •  vitamin D (CHOLECALCIFEROL) 1000 UNIT TABS, Take 4,000 Units by mouth every day., Disp: , Rfl:   ALLERGIES:   Allergies   Allergen Reactions   • Iodine      SURGHX:   Past Surgical History:   Procedure Laterality Date   • RECOVERY  5/19/2011    Performed by SURGERY, IR-RECOVERY at SURGERY SAME DAY [facility]   • PACEMAKER INSERTION  September 2009    Medtronic Versa [id number] implanted by Dr. Lexa Lopes.   • EYE SURGERY  2006    left eye mass/ cancer/ s/p radiation   • LITHOTRIPSY  1995    left kidney x 3   • PROSTATECTOMY, RADICAL RETRO  1991   • EYE SURGERY       SOCHX:  reports that he has never smoked. He has never  used smokeless tobacco. He reports that he does not drink alcohol or use drugs.  FH: family history includes Heart Attack in his father and mother; Heart Disease in his father and mother.       Assessment/Plan:     1. Abrasion of skin    No sign of infection at today's visit. Encouraged patient to wash the area daily and apply topical antibiotic ointment. Monitor closely for development of any signs of infection and RTC promptly for reevaluation. The patient demonstrated a good understanding and agreed with the treatment plan.

## 2017-12-06 ENCOUNTER — HOSPITAL ENCOUNTER (OUTPATIENT)
Dept: LAB | Facility: MEDICAL CENTER | Age: 82
End: 2017-12-06
Attending: INTERNAL MEDICINE
Payer: COMMERCIAL

## 2017-12-06 LAB
INR PPP: 2.88 (ref 0.87–1.13)
PROTHROMBIN TIME: 29.9 SEC (ref 12–14.6)

## 2017-12-06 PROCEDURE — 85610 PROTHROMBIN TIME: CPT

## 2017-12-06 PROCEDURE — 36415 COLL VENOUS BLD VENIPUNCTURE: CPT

## 2017-12-07 ENCOUNTER — ANTICOAGULATION MONITORING (OUTPATIENT)
Dept: VASCULAR LAB | Facility: MEDICAL CENTER | Age: 82
End: 2017-12-07

## 2017-12-07 DIAGNOSIS — I48.0 PAROXYSMAL ATRIAL FIBRILLATION (HCC): ICD-10-CM

## 2017-12-07 DIAGNOSIS — Z86.73 HX-TIA (TRANSIENT ISCHEMIC ATTACK): ICD-10-CM

## 2017-12-07 NOTE — PROGRESS NOTES
Anticoagulation Summary  As of 12/7/2017    INR goal:   2.0-3.0   TTR:   85.0 % (2.5 y)   Today's INR:   2.88 (12/6/2017)   Maintenance plan:   7.5 mg (5 mg x 1.5) on Mon, Wed, Fri; 5 mg (5 mg x 1) all other days   Weekly total:   42.5 mg   Plan last modified:   Jean Ayala PharmJUAN (11/12/2015)   Next INR check:   1/4/2018   Target end date:   Indefinite    Indications    Paroxysmal atrial fibrillation (CMS-HCC) [I48.0]  Hx-TIA (transient ischemic attack) [Z86.73]             Anticoagulation Episode Summary     INR check location:   Outside Lab    Preferred lab:       Send INR reminders to:       Comments:   [facility]      Anticoagulation Care Providers     Provider Role Specialty Phone number    Taz Jc M.D. Referring Cardiology [phone]    Jean Ayala, PharmD Responsible          Anticoagulation Patient Findings    Spoke to patient on the phone.   INR  therapeutic.   Denies signs/symptoms of bleeding and/or thrombosis.   Denies changes to diet or medications.   Follow up appointment in 4 week(s).    Continue weekly warfarin dose as noted      Colton Gonzalez, PharmD

## 2017-12-15 DIAGNOSIS — Z79.899 MEDICATION MANAGEMENT: ICD-10-CM

## 2017-12-15 RX ORDER — WARFARIN SODIUM 5 MG/1
5 TABLET ORAL DAILY
Qty: 100 TAB | Refills: 0 | Status: SHIPPED | OUTPATIENT
Start: 2017-12-15 | End: 2018-03-04 | Stop reason: SDUPTHER

## 2018-01-09 ENCOUNTER — HOSPITAL ENCOUNTER (OUTPATIENT)
Dept: LAB | Facility: MEDICAL CENTER | Age: 83
End: 2018-01-09
Attending: NURSE PRACTITIONER
Payer: MEDICARE

## 2018-01-09 DIAGNOSIS — I48.0 PAROXYSMAL ATRIAL FIBRILLATION (HCC): ICD-10-CM

## 2018-01-09 LAB
INR PPP: 2.91 (ref 0.87–1.13)
PROTHROMBIN TIME: 30.1 SEC (ref 12–14.6)

## 2018-01-09 PROCEDURE — 85610 PROTHROMBIN TIME: CPT

## 2018-01-09 PROCEDURE — 36415 COLL VENOUS BLD VENIPUNCTURE: CPT

## 2018-01-11 ENCOUNTER — ANTICOAGULATION MONITORING (OUTPATIENT)
Dept: VASCULAR LAB | Facility: MEDICAL CENTER | Age: 83
End: 2018-01-11

## 2018-01-11 DIAGNOSIS — I48.0 PAROXYSMAL ATRIAL FIBRILLATION (HCC): ICD-10-CM

## 2018-01-11 DIAGNOSIS — Z86.73 HX-TIA (TRANSIENT ISCHEMIC ATTACK): ICD-10-CM

## 2018-01-11 NOTE — PROGRESS NOTES
Anticoagulation Summary  As of 1/11/2018    INR goal:   2.0-3.0   TTR:   85.5 % (2.6 y)   Today's INR:   2.91 (1/9/2018)   Maintenance plan:   7.5 mg (5 mg x 1.5) on Mon, Wed, Fri; 5 mg (5 mg x 1) all other days   Weekly total:   42.5 mg   Plan last modified:   Jean Ayala PharmD (11/12/2015)   Next INR check:   2/6/2018   Target end date:   Indefinite    Indications    Paroxysmal atrial fibrillation (CMS-HCC) [I48.0]  Hx-TIA (transient ischemic attack) [Z86.73]             Anticoagulation Episode Summary     INR check location:   Outside Lab    Preferred lab:       Send INR reminders to:       Comments:   [facility]      Anticoagulation Care Providers     Provider Role Specialty Phone number    Taz Jc M.D. Referring Cardiology [phone]    Jean Ayala, PharmD Responsible          Anticoagulation Patient Findings    See note by Ibeth Romero  06/07/2016    Saira Bazan, MarionD

## 2018-02-06 ENCOUNTER — APPOINTMENT (RX ONLY)
Dept: URBAN - METROPOLITAN AREA CLINIC 4 | Facility: CLINIC | Age: 83
Setting detail: DERMATOLOGY
End: 2018-02-06

## 2018-02-06 DIAGNOSIS — L82.0 INFLAMED SEBORRHEIC KERATOSIS: ICD-10-CM

## 2018-02-06 DIAGNOSIS — L81.4 OTHER MELANIN HYPERPIGMENTATION: ICD-10-CM

## 2018-02-06 DIAGNOSIS — D18.0 HEMANGIOMA: ICD-10-CM

## 2018-02-06 DIAGNOSIS — L11.1 TRANSIENT ACANTHOLYTIC DERMATOSIS [GROVER]: ICD-10-CM

## 2018-02-06 DIAGNOSIS — Z85.820 PERSONAL HISTORY OF MALIGNANT MELANOMA OF SKIN: ICD-10-CM

## 2018-02-06 DIAGNOSIS — L82.1 OTHER SEBORRHEIC KERATOSIS: ICD-10-CM

## 2018-02-06 DIAGNOSIS — L57.0 ACTINIC KERATOSIS: ICD-10-CM

## 2018-02-06 DIAGNOSIS — D22 MELANOCYTIC NEVI: ICD-10-CM

## 2018-02-06 PROBLEM — D22.62 MELANOCYTIC NEVI OF LEFT UPPER LIMB, INCLUDING SHOULDER: Status: ACTIVE | Noted: 2018-02-06

## 2018-02-06 PROBLEM — D22.39 MELANOCYTIC NEVI OF OTHER PARTS OF FACE: Status: ACTIVE | Noted: 2018-02-06

## 2018-02-06 PROBLEM — D48.5 NEOPLASM OF UNCERTAIN BEHAVIOR OF SKIN: Status: ACTIVE | Noted: 2018-02-06

## 2018-02-06 PROBLEM — D22.5 MELANOCYTIC NEVI OF TRUNK: Status: ACTIVE | Noted: 2018-02-06

## 2018-02-06 PROBLEM — I10 ESSENTIAL (PRIMARY) HYPERTENSION: Status: ACTIVE | Noted: 2018-02-06

## 2018-02-06 PROBLEM — D18.01 HEMANGIOMA OF SKIN AND SUBCUTANEOUS TISSUE: Status: ACTIVE | Noted: 2018-02-06

## 2018-02-06 PROBLEM — D22.61 MELANOCYTIC NEVI OF RIGHT UPPER LIMB, INCLUDING SHOULDER: Status: ACTIVE | Noted: 2018-02-06

## 2018-02-06 PROCEDURE — ? LIQUID NITROGEN

## 2018-02-06 PROCEDURE — 11100: CPT | Mod: 59

## 2018-02-06 PROCEDURE — 99214 OFFICE O/P EST MOD 30 MIN: CPT | Mod: 25

## 2018-02-06 PROCEDURE — ? BIOPSY BY SHAVE METHOD

## 2018-02-06 PROCEDURE — 17004 DESTROY PREMAL LESIONS 15/>: CPT

## 2018-02-06 PROCEDURE — ? SUNSCREEN RECOMMENDATIONS

## 2018-02-06 PROCEDURE — ? COUNSELING

## 2018-02-06 ASSESSMENT — LOCATION ZONE DERM
LOCATION ZONE: ARM
LOCATION ZONE: NECK
LOCATION ZONE: TRUNK
LOCATION ZONE: EAR
LOCATION ZONE: FACE

## 2018-02-06 ASSESSMENT — LOCATION SIMPLE DESCRIPTION DERM
LOCATION SIMPLE: POSTERIOR NECK
LOCATION SIMPLE: LEFT FOREARM
LOCATION SIMPLE: LEFT FOREHEAD
LOCATION SIMPLE: RIGHT CHEEK
LOCATION SIMPLE: LEFT EAR
LOCATION SIMPLE: RIGHT EYEBROW
LOCATION SIMPLE: LEFT ANTERIOR NECK
LOCATION SIMPLE: CHEST
LOCATION SIMPLE: LEFT UPPER BACK
LOCATION SIMPLE: RIGHT FOREARM
LOCATION SIMPLE: RIGHT EAR
LOCATION SIMPLE: LEFT CHEEK
LOCATION SIMPLE: RIGHT FOREHEAD
LOCATION SIMPLE: TRAPEZIAL NECK
LOCATION SIMPLE: RIGHT UPPER BACK
LOCATION SIMPLE: RIGHT ANTERIOR NECK

## 2018-02-06 ASSESSMENT — LOCATION DETAILED DESCRIPTION DERM
LOCATION DETAILED: STERNAL NOTCH
LOCATION DETAILED: LEFT FOREHEAD
LOCATION DETAILED: RIGHT CRUS OF HELIX
LOCATION DETAILED: LEFT SUPERIOR FOREHEAD
LOCATION DETAILED: LEFT MEDIAL MALAR CHEEK
LOCATION DETAILED: LEFT MEDIAL UPPER BACK
LOCATION DETAILED: LEFT MEDIAL FOREHEAD
LOCATION DETAILED: RIGHT INFERIOR LATERAL NECK
LOCATION DETAILED: LEFT SUPERIOR LATERAL NECK
LOCATION DETAILED: LEFT SUPERIOR CRUS OF ANTIHELIX
LOCATION DETAILED: RIGHT SUPERIOR ANTERIOR NECK
LOCATION DETAILED: MID TRAPEZIAL NECK
LOCATION DETAILED: RIGHT PROXIMAL DORSAL FOREARM
LOCATION DETAILED: RIGHT LATERAL EYEBROW
LOCATION DETAILED: RIGHT INFERIOR LATERAL FOREHEAD
LOCATION DETAILED: RIGHT INFERIOR MEDIAL FOREHEAD
LOCATION DETAILED: RIGHT LATERAL SUPERIOR CHEST
LOCATION DETAILED: LEFT SUPERIOR MEDIAL UPPER BACK
LOCATION DETAILED: RIGHT SUPERIOR FOREHEAD
LOCATION DETAILED: LEFT SUPERIOR PREAURICULAR CHEEK
LOCATION DETAILED: RIGHT FOREHEAD
LOCATION DETAILED: RIGHT SUPERIOR LATERAL UPPER BACK
LOCATION DETAILED: LEFT INFERIOR CENTRAL MALAR CHEEK
LOCATION DETAILED: LEFT INFERIOR LATERAL UPPER BACK
LOCATION DETAILED: LEFT PROXIMAL DORSAL FOREARM
LOCATION DETAILED: RIGHT POSTERIOR NECK
LOCATION DETAILED: LEFT MID-UPPER BACK
LOCATION DETAILED: LEFT MID PREAURICULAR CHEEK
LOCATION DETAILED: RIGHT INFERIOR MEDIAL UPPER BACK
LOCATION DETAILED: RIGHT SUPERIOR LATERAL MALAR CHEEK

## 2018-02-06 NOTE — PROCEDURE: LIQUID NITROGEN
Duration Of Freeze Thaw-Cycle (Seconds): 3
Post-Care Instructions: I reviewed with the patient in detail post-care instructions. Patient is to wear sunprotection, and avoid picking at any of the treated lesions. Pt may apply Vaseline to crusted or scabbing areas.
Detail Level: Detailed
Consent: The patient's consent was obtained including but not limited to risks of crusting, scabbing, blistering, scarring, darker or lighter pigmentary change, recurrence, incomplete removal and infection.
Number Of Freeze-Thaw Cycles: 2 freeze-thaw cycles
Render Post-Care Instructions In Note?: no

## 2018-02-06 NOTE — PROCEDURE: BIOPSY BY SHAVE METHOD
Hemostasis: Drysol
Destruction After The Procedure: No
Dressing: bandage
Size Of Lesion In Cm: 0
Electrodesiccation And Curettage Text: The wound bed was treated with electrodesiccation and curettage after the biopsy was performed.
Lab: [id number]
Wound Care: Bacitracin
Detail Level: Detailed
Notification Instructions: Patient will be notified of biopsy results. However, patient instructed to call the office if not contacted within 2 weeks.
Consent: Written consent was obtained and risks were reviewed including but not limited to scarring, infection, bleeding, scabbing, incomplete removal, nerve damage and allergy to anesthesia.
Billing Type: Third-Party Bill
Type Of Destruction Used: Curettage
Anesthesia Volume In Cc: 2
Post-Care Instructions: I reviewed with the patient in detail post-care instructions. Patient is to keep the biopsy site dry overnight, and then apply bacitracin twice daily until healed. Patient may apply hydrogen peroxide soaks to remove any crusting.
Anesthesia Type: 1% lidocaine with epinephrine
Lab Facility: 
Curettage Text: The wound bed was treated with curettage after the biopsy was performed.
Electrodesiccation Text: The wound bed was treated with electrodesiccation after the biopsy was performed.
Cryotherapy Text: The wound bed was treated with cryotherapy after the biopsy was performed.
Biopsy Type: H and E
Biopsy Method: Personna blade
Silver Nitrate Text: The wound bed was treated with silver nitrate after the biopsy was performed.

## 2018-02-08 ENCOUNTER — OFFICE VISIT (OUTPATIENT)
Dept: MEDICAL GROUP | Facility: PHYSICIAN GROUP | Age: 83
End: 2018-02-08
Payer: MEDICARE

## 2018-02-08 VITALS
DIASTOLIC BLOOD PRESSURE: 78 MMHG | HEIGHT: 64 IN | TEMPERATURE: 97.5 F | SYSTOLIC BLOOD PRESSURE: 142 MMHG | HEART RATE: 67 BPM | OXYGEN SATURATION: 93 %

## 2018-02-08 DIAGNOSIS — N28.89 RENAL MASS, LEFT: ICD-10-CM

## 2018-02-08 DIAGNOSIS — Z85.46 PERSONAL HISTORY OF MALIGNANT NEOPLASM OF PROSTATE: ICD-10-CM

## 2018-02-08 DIAGNOSIS — C43.9 MALIGNANT MELANOMA, UNSPECIFIED SITE (HCC): ICD-10-CM

## 2018-02-08 DIAGNOSIS — C85.99 EXTRANODAL LYMPHOMA (HCC): ICD-10-CM

## 2018-02-08 DIAGNOSIS — Z23 NEED FOR VACCINATION: ICD-10-CM

## 2018-02-08 DIAGNOSIS — Z86.79 HISTORY OF COMPLETE AV BLOCK: ICD-10-CM

## 2018-02-08 DIAGNOSIS — I48.0 PAROXYSMAL ATRIAL FIBRILLATION (HCC): ICD-10-CM

## 2018-02-08 DIAGNOSIS — I10 ESSENTIAL HYPERTENSION: ICD-10-CM

## 2018-02-08 DIAGNOSIS — E55.9 VITAMIN D DEFICIENCY: ICD-10-CM

## 2018-02-08 PROCEDURE — 90670 PCV13 VACCINE IM: CPT | Performed by: FAMILY MEDICINE

## 2018-02-08 PROCEDURE — G0009 ADMIN PNEUMOCOCCAL VACCINE: HCPCS | Performed by: FAMILY MEDICINE

## 2018-02-08 PROCEDURE — 99214 OFFICE O/P EST MOD 30 MIN: CPT | Mod: 25 | Performed by: FAMILY MEDICINE

## 2018-02-08 ASSESSMENT — PAIN SCALES - GENERAL: PAINLEVEL: NO PAIN

## 2018-02-08 ASSESSMENT — PATIENT HEALTH QUESTIONNAIRE - PHQ9: CLINICAL INTERPRETATION OF PHQ2 SCORE: 0

## 2018-02-09 NOTE — ASSESSMENT & PLAN NOTE
Patient had prostate cancer in 1990 and had surgery for it.Currently doing well.Follows up yearly with his Urologist.

## 2018-02-10 PROBLEM — C43.9 MALIGNANT MELANOMA (HCC): Status: ACTIVE | Noted: 2018-02-10

## 2018-02-10 NOTE — ASSESSMENT & PLAN NOTE
Established diagnosis.Patient is taking losartan and Cardizem.BP is slightly elevated according to current standards .Checks BP at home.Denies chest pain, SOB, headache, visual disturbances, dizziness, weakness.

## 2018-02-10 NOTE — ASSESSMENT & PLAN NOTE
Patient has AV block and had pacemaker implantation several years ago.Doing well now.Follows with Cardiology every 6 months for check up.

## 2018-02-10 NOTE — PROGRESS NOTES
Subjective:   Primitivo Milan is a 89 y.o. male here today for establishing care and discuss chronic problems including hypertension and multiple malignancies.    HPI :    Personal history of malignant neoplasm of prostate  Patient had prostate cancer in 1990 and had surgery for it.Currently doing well.Follows up yearly with his Urologist.    Paroxysmal atrial fibrillation  Established diagnosis.Rate controlled on Cardizem and takes Coumadin for anticoagulation.Sees Cardiologist, .    Vitamin D deficiency  Takes Vitamin D supplements.Last Vitamin D level slightly low.    Essential hypertension  Established diagnosis.Patient is taking losartan and Cardizem.BP is slightly elevated according to current standards .Checks BP at home.Denies chest pain, SOB, headache, visual disturbances, dizziness, weakness.      History of complete AV block  Patient has AV block and had pacemaker implantation several years ago.Doing well now.Follows with Cardiology every 6 months for check up.    Renal mass, left  Patient was diagnosed with left renal mass several years ago , likely renal cell carcinoma.Has been stable on previous ultrasounds.LAst ultrasound on chart from 2014.Patient states that this is being monitored by his Urologist.    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    Malignant melanoma (CMS-HCC)  Patient also seeing Dermatology for malignant melanoma recently diagnosed in 2017 upon biopsy of a lesion on left scapula.They have decided not to do any aggressive treatment for it.       Current medicines (including changes today)  Current Outpatient Prescriptions   Medication Sig Dispense Refill   • warfarin (COUMADIN) 5 MG Tab TAKE 1 TAB BY MOUTH EVERY  "DAY. 100 Tab 0   • losartan (COZAAR) 25 MG Tab Take 1 Tab by mouth every day. 90 Tab 3   • diltiazem CD (CARDIZEM CD) 240 MG CAPSULE SR 24 HR TAKE 1 CAP BY MOUTH EVERY DAY. 90 Cap 3   • vitamin D (CHOLECALCIFEROL) 1000 UNIT TABS Take 4,000 Units by mouth every day.       No current facility-administered medications for this visit.      He  has a past medical history of Anticoagulation monitoring, special range; Atrial fibrillation (CMS-HCC); Atrioventricular block, complete (CMS-HCC); Bell's palsy; Cancer (CMS-HCC) (2001); Cancer (CMS-HCC) (1991); HTN; Kidney mass; Other dyspnea and respiratory abnormality; Other malignant lymphomas, unspecified site, extranodal and solid organ sites; Paroxysmal atrial fibrillation (CMS-HCC); Personal history of malignant neoplasm of prostate; Personal history of venous thrombosis and embolism; Renal disorder (1970); Sleep apnea; Stroke (CMS-HCC) (1991); and TIA (transient ischemic attack).    ROS   No chest pain, no shortness of breath, no abdominal pain  No fever, no cough     Objective:     Blood pressure 142/78, pulse 67, temperature 36.4 °C (97.5 °F), height 1.626 m (5' 4\"), SpO2 93 %. There is no height or weight on file to calculate BMI.     PHYSICAL EXAM     GEN: Alert and oriented,well appearing, no acute distress  SKIN: warm, dry to touch, no rashes or lesions in visible areas  PSYCH: mood and affect normal, judgement normal  EYES: Conjunctiva clear, lids normal,pupils equal round and reactive  ENMT: Normal external nose and ears,EACs normal appearing, TM pearly gray with normal light reflex bilaterally,nasal mucosa and turbinates normal appearing without erythema or edema, lips without lesions,good dentition,oropharynx clear  Neck : Trachea midline, no masses or swelling, no thyromegaly  LYMPHATIC : No cervical or supraclavicular lymphadenopathy  RESPIRATORY : Unlabored respiratory effort, no distress noted, clear to auscultation bilaterally, no wheeze, rhonchi, " crackles  CARDIOVASCULAR: irregularly irregular, S1 S2 normal, no murmurs   MUSCULOSKELETAL : Normal gait and stance, no obvious abnormalities   NEURO: No overt focal neurologic deficits,sensation intact        Assessment and Plan:   The following treatment plan was discussed    1. Paroxysmal atrial fibrillation (CMS-[facility])  Chronic problem.Rate controlled.On anticoagulation.Continue f/u with Cardiology    2. Essential hypertension  Chronic problem.BP slightly elevated.Goal BP < 130/80.Monitor BP at home.Will follow up with BP high  - COMP METABOLIC PANEL; Future  - CBC WITH DIFFERENTIAL; Future    3. Personal history of malignant neoplasm of prostate  S/p radical prostatectomy.Asymptomatic.F/U with Urology    4. Need for vaccination  - PNEUMOCOCCAL CONJUGATE VACCINE 13-VALENT    5. Vitamin D deficiency  Continue Vitamin D supplements    6. History of complete AV block  S/p pacemaker.Reviewed old records.F/U with Cardiology    7. Renal mass, left  Patient not desiring any intervention.No complaints.Continue f/u with Urology.Continue to monitor.    8. Extranodal lymphoma (CMS-HCC)  Reviewed previous records, imaging studies and biopsy results.Patient decided not to carry out any interventions.Asymptomatic.Continue to monitor.    9. Malignant melanoma, unspecified site (CMS-HCC)  Recent diagnosis.Reviewed records.Non aggressive management.Continue f/u with Dermatology.    Total 30 minutes face-to-face time spent with patient, with greater than 50% of the total time discussing patient's issues and symptoms, medical conditions, reviewing previous records as listed above in assessment and plan, as well as managing coordination of care for future evaluation and treatment.    Followup: Return in about 6 months (around 8/8/2018) for follow up on chronic med conditions.         Please note that this dictation was created using voice recognition software. I have made every reasonable attempt to correct obvious errors, but I  expect that there are errors of grammar and possibly content that I did not discover before finalizing the note.

## 2018-02-10 NOTE — ASSESSMENT & PLAN NOTE
Patient was diagnosed with left renal mass several years ago , likely renal cell carcinoma.Has been stable on previous ultrasounds.LAst ultrasound on chart from 2014.Patient states that this is being monitored by his Urologist.

## 2018-02-10 NOTE — ASSESSMENT & PLAN NOTE
Patient also seeing Dermatology for malignant melanoma recently diagnosed in 2017 upon biopsy of a lesion on left scapula.They have decided not to do any aggressive treatment for it.

## 2018-02-10 NOTE — ASSESSMENT & PLAN NOTE
Established diagnosis.Rate controlled on Cardizem and takes Coumadin for anticoagulation.Sees Cardiologist, .

## 2018-02-10 NOTE — ASSESSMENT & PLAN NOTE
Patient was diagnosed with extranodal lymphoma involving the lung.Had biopsy in 2011 of a lung mass and diagnosis was confirmed.Had decided against any intervention due to fear of complications.Does not have any complaints.Denies any chest pain or shortness of breath.

## 2018-02-12 ENCOUNTER — HOSPITAL ENCOUNTER (OUTPATIENT)
Dept: LAB | Facility: MEDICAL CENTER | Age: 83
End: 2018-02-12
Attending: NURSE PRACTITIONER
Payer: MEDICARE

## 2018-02-12 LAB
INR PPP: 3.29 (ref 0.87–1.13)
PROTHROMBIN TIME: 33.2 SEC (ref 12–14.6)

## 2018-02-12 PROCEDURE — 85610 PROTHROMBIN TIME: CPT

## 2018-02-12 PROCEDURE — 36415 COLL VENOUS BLD VENIPUNCTURE: CPT

## 2018-02-13 ENCOUNTER — ANTICOAGULATION MONITORING (OUTPATIENT)
Dept: VASCULAR LAB | Facility: MEDICAL CENTER | Age: 83
End: 2018-02-13

## 2018-02-13 DIAGNOSIS — I48.0 PAROXYSMAL ATRIAL FIBRILLATION (HCC): ICD-10-CM

## 2018-02-13 DIAGNOSIS — Z86.73 HX-TIA (TRANSIENT ISCHEMIC ATTACK): ICD-10-CM

## 2018-02-13 NOTE — PROGRESS NOTES
OP Telephone Anticoagulation Service Note    Date: 2/13/2018      Anticoagulation Summary  As of 2/13/2018    INR goal:   2.0-3.0   TTR:   83.4 % (2.7 y)   Today's INR:   3.29! (2/12/2018)   Maintenance plan:   7.5 mg (5 mg x 1.5) on Mon, Wed, Fri; 5 mg (5 mg x 1) all other days   Weekly total:   42.5 mg   Plan last modified:   Jean Ayala PharmD (11/12/2015)   Next INR check:   2/26/2018   Target end date:   Indefinite    Indications    Paroxysmal atrial fibrillation (CMS-HCC) [I48.0]  Hx-TIA (transient ischemic attack) [Z86.73]             Anticoagulation Episode Summary     INR check location:   Outside Lab    Preferred lab:       Send INR reminders to:       Comments:   [facility]      Anticoagulation Care Providers     Provider Role Specialty Phone number    Taz Jc M.D. Referring Cardiology [phone]    Jean Ayala PharmD Responsible          Anticoagulation Patient Findings        Plan: INR is high today. Spoke with pt on the phone. Confirmed dosing regimen. No missed or extra doses taken. Patient reports a few nose bleed that were easy to stop. No recent medication changes and patient has been eating a consistent diet. He has been sick with congestion. Instructed pt to call clinic with any concerns of bleeding or thrombosis. Tomorrow take 5 mg then resume usual regimen Follow up in 2 week(s)      Rashmi Lerma, Lai

## 2018-02-21 ENCOUNTER — HOSPITAL ENCOUNTER (OUTPATIENT)
Dept: LAB | Facility: MEDICAL CENTER | Age: 83
End: 2018-02-21
Attending: FAMILY MEDICINE
Payer: MEDICARE

## 2018-02-21 DIAGNOSIS — I10 ESSENTIAL HYPERTENSION: ICD-10-CM

## 2018-02-21 LAB
ALBUMIN SERPL BCP-MCNC: 4.2 G/DL (ref 3.2–4.9)
ALBUMIN/GLOB SERPL: 1.6 G/DL
ALP SERPL-CCNC: 54 U/L (ref 30–99)
ALT SERPL-CCNC: 15 U/L (ref 2–50)
ANION GAP SERPL CALC-SCNC: 9 MMOL/L (ref 0–11.9)
AST SERPL-CCNC: 32 U/L (ref 12–45)
BASOPHILS # BLD AUTO: 0.9 % (ref 0–1.8)
BASOPHILS # BLD: 0.05 K/UL (ref 0–0.12)
BILIRUB SERPL-MCNC: 0.8 MG/DL (ref 0.1–1.5)
BUN SERPL-MCNC: 32 MG/DL (ref 8–22)
CALCIUM SERPL-MCNC: 9.4 MG/DL (ref 8.5–10.5)
CHLORIDE SERPL-SCNC: 106 MMOL/L (ref 96–112)
CO2 SERPL-SCNC: 27 MMOL/L (ref 20–33)
CREAT SERPL-MCNC: 1.62 MG/DL (ref 0.5–1.4)
EOSINOPHIL # BLD AUTO: 0.12 K/UL (ref 0–0.51)
EOSINOPHIL NFR BLD: 2.2 % (ref 0–6.9)
ERYTHROCYTE [DISTWIDTH] IN BLOOD BY AUTOMATED COUNT: 49.7 FL (ref 35.9–50)
GLOBULIN SER CALC-MCNC: 2.7 G/DL (ref 1.9–3.5)
GLUCOSE SERPL-MCNC: 83 MG/DL (ref 65–99)
HCT VFR BLD AUTO: 47 % (ref 42–52)
HGB BLD-MCNC: 15.7 G/DL (ref 14–18)
IMM GRANULOCYTES # BLD AUTO: 0.02 K/UL (ref 0–0.11)
IMM GRANULOCYTES NFR BLD AUTO: 0.4 % (ref 0–0.9)
LYMPHOCYTES # BLD AUTO: 1.05 K/UL (ref 1–4.8)
LYMPHOCYTES NFR BLD: 19.2 % (ref 22–41)
MCH RBC QN AUTO: 32.2 PG (ref 27–33)
MCHC RBC AUTO-ENTMCNC: 33.4 G/DL (ref 33.7–35.3)
MCV RBC AUTO: 96.5 FL (ref 81.4–97.8)
MONOCYTES # BLD AUTO: 0.69 K/UL (ref 0–0.85)
MONOCYTES NFR BLD AUTO: 12.6 % (ref 0–13.4)
NEUTROPHILS # BLD AUTO: 3.54 K/UL (ref 1.82–7.42)
NEUTROPHILS NFR BLD: 64.7 % (ref 44–72)
NRBC # BLD AUTO: 0 K/UL
NRBC BLD-RTO: 0 /100 WBC
PLATELET # BLD AUTO: 177 K/UL (ref 164–446)
PMV BLD AUTO: 11.3 FL (ref 9–12.9)
POTASSIUM SERPL-SCNC: 4.6 MMOL/L (ref 3.6–5.5)
PROT SERPL-MCNC: 6.9 G/DL (ref 6–8.2)
RBC # BLD AUTO: 4.87 M/UL (ref 4.7–6.1)
SODIUM SERPL-SCNC: 142 MMOL/L (ref 135–145)
WBC # BLD AUTO: 5.5 K/UL (ref 4.8–10.8)

## 2018-02-21 PROCEDURE — 36415 COLL VENOUS BLD VENIPUNCTURE: CPT

## 2018-02-21 PROCEDURE — 80053 COMPREHEN METABOLIC PANEL: CPT

## 2018-02-21 PROCEDURE — 85025 COMPLETE CBC W/AUTO DIFF WBC: CPT

## 2018-02-22 ENCOUNTER — TELEPHONE (OUTPATIENT)
Dept: MEDICAL GROUP | Facility: PHYSICIAN GROUP | Age: 83
End: 2018-02-22

## 2018-02-22 NOTE — TELEPHONE ENCOUNTER
----- Message from Blanca Pulido M.D. sent at 2/21/2018  9:40 PM PST -----  Please inform patient that his blood tests are stable.No new abnormalities.Does have a suboptimal kidney function which is not new and has been stable for the last two or more years.Advise to drink water, stay hydrated and avoid NSAIDs (ibuprofen, aleve, naproxen etc.)    Blanca Pulido M.D.

## 2018-02-27 ENCOUNTER — HOSPITAL ENCOUNTER (OUTPATIENT)
Dept: LAB | Facility: MEDICAL CENTER | Age: 83
End: 2018-02-27
Attending: NURSE PRACTITIONER
Payer: MEDICARE

## 2018-02-27 LAB
INR PPP: 2.98 (ref 0.87–1.13)
PROTHROMBIN TIME: 30.7 SEC (ref 12–14.6)

## 2018-02-27 PROCEDURE — 85610 PROTHROMBIN TIME: CPT

## 2018-02-27 PROCEDURE — 36415 COLL VENOUS BLD VENIPUNCTURE: CPT

## 2018-02-28 ENCOUNTER — ANTICOAGULATION MONITORING (OUTPATIENT)
Dept: VASCULAR LAB | Facility: MEDICAL CENTER | Age: 83
End: 2018-02-28

## 2018-02-28 DIAGNOSIS — Z86.73 HX-TIA (TRANSIENT ISCHEMIC ATTACK): ICD-10-CM

## 2018-02-28 DIAGNOSIS — I48.0 PAROXYSMAL ATRIAL FIBRILLATION (HCC): ICD-10-CM

## 2018-02-28 NOTE — PROGRESS NOTES
Anticoagulation Summary  As of 2/28/2018    INR goal:   2.0-3.0   TTR:   82.2 % (2.8 y)   Today's INR:   2.98 (2/27/2018)   Maintenance plan:   7.5 mg (5 mg x 1.5) on Mon, Wed, Fri; 5 mg (5 mg x 1) all other days   Weekly total:   42.5 mg   Plan last modified:   Jean Ayala PharmD (11/12/2015)   Next INR check:   3/14/2018   Target end date:   Indefinite    Indications    Paroxysmal atrial fibrillation (CMS-HCC) [I48.0]  Hx-TIA (transient ischemic attack) [Z86.73]             Anticoagulation Episode Summary     INR check location:   Outside Lab    Preferred lab:       Send INR reminders to:       Comments:   [facility]      Anticoagulation Care Providers     Provider Role Specialty Phone number    Taz Jc M.D. Referring Cardiology [phone]    Jean Ayala PharmD Responsible          Anticoagulation Patient Findings  Patient Findings     Negatives:   Signs/symptoms of thrombosis, Signs/symptoms of bleeding, Laboratory test error suspected, Change in health, Change in alcohol use, Change in activity, Upcoming invasive procedure, Emergency department visit, Upcoming dental procedure, Missed doses, Extra doses, Change in medications, Change in diet/appetite, Hospital admission, Bruising, Other complaints        Spoke with patient today regarding therapeutic INR of 2.98.  Patient denies any signs/symptoms of bruising or bleeding or any changes in diet and medications.  Instructed patient to call clinic with any questions or concerns.  Pt is to continue with current warfarin dosing regimen.  Follow up in 2 weeks, to reduce risk of adverse events related to this high risk medication,  Warfarin.    Sánchez Ochoa, MarionD

## 2018-03-04 DIAGNOSIS — Z79.899 MEDICATION MANAGEMENT: ICD-10-CM

## 2018-03-05 RX ORDER — WARFARIN SODIUM 5 MG/1
5 TABLET ORAL DAILY
Qty: 90 TAB | Refills: 1 | Status: SHIPPED | OUTPATIENT
Start: 2018-03-05 | End: 2018-09-04 | Stop reason: SDUPTHER

## 2018-03-14 ENCOUNTER — HOSPITAL ENCOUNTER (OUTPATIENT)
Dept: LAB | Facility: MEDICAL CENTER | Age: 83
End: 2018-03-14
Attending: NURSE PRACTITIONER
Payer: MEDICARE

## 2018-03-14 DIAGNOSIS — I48.0 PAROXYSMAL ATRIAL FIBRILLATION (HCC): ICD-10-CM

## 2018-03-14 LAB
INR PPP: 2.43 (ref 0.87–1.13)
PROTHROMBIN TIME: 26.1 SEC (ref 12–14.6)

## 2018-03-14 PROCEDURE — 36415 COLL VENOUS BLD VENIPUNCTURE: CPT

## 2018-03-14 PROCEDURE — 85610 PROTHROMBIN TIME: CPT

## 2018-03-15 ENCOUNTER — ANTICOAGULATION MONITORING (OUTPATIENT)
Dept: VASCULAR LAB | Facility: MEDICAL CENTER | Age: 83
End: 2018-03-15

## 2018-03-15 DIAGNOSIS — I48.0 PAROXYSMAL ATRIAL FIBRILLATION (HCC): ICD-10-CM

## 2018-03-15 DIAGNOSIS — Z86.73 HX-TIA (TRANSIENT ISCHEMIC ATTACK): ICD-10-CM

## 2018-03-15 NOTE — PROGRESS NOTES
OP Anticoagulation Telephone Note    Date: 3/15/2018  Anticoagulation Summary  As of 3/15/2018    INR goal:   2.0-3.0   TTR:   82.5 % (2.8 y)   Today's INR:   2.43 (3/14/2018)   Maintenance plan:   7.5 mg (5 mg x 1.5) on Mon, Wed, Fri; 5 mg (5 mg x 1) all other days   Weekly total:   42.5 mg   No change documented:   Rod Galvan Ass't   Plan last modified:   Marion MackenzieD (11/12/2015)   Next INR check:   4/11/2018   Target end date:   Indefinite    Indications    Paroxysmal atrial fibrillation (CMS-HCC) [I48.0]  Hx-TIA (transient ischemic attack) [Z86.73]             Anticoagulation Episode Summary     INR check location:   Outside Lab    Preferred lab:       Send INR reminders to:       Comments:   [facility]      Anticoagulation Care Providers     Provider Role Specialty Phone number    Taz Jc M.D. Referring Cardiology [phone]    Jean Ayala, PharmD Responsible          Anticoagulation Patient Findings  Patient Findings     Negatives:   Signs/symptoms of thrombosis, Signs/symptoms of bleeding, Laboratory test error suspected, Change in health, Change in alcohol use, Change in activity, Upcoming invasive procedure, Emergency department visit, Upcoming dental procedure, Missed doses, Extra doses, Change in medications, Change in diet/appetite, Hospital admission, Bruising, Other complaints      Plan:  Spoke with patient on the phone. Patient is therapeutic today. Patient denies any changes in medications or diet. Patient denies any signs or symptoms of bleeding or clotting. Instructed patient to call clinic if any unusual bleeding or bruising occurs. Will continue dosing as outlined above. Will follow-up with patient in 4 weeks per patient's request.    Theresa Amanda, Medical Assistant    I have reviewed and concur with the above plan     Colton Goznalez, MarionD

## 2018-04-12 ENCOUNTER — HOSPITAL ENCOUNTER (OUTPATIENT)
Dept: LAB | Facility: MEDICAL CENTER | Age: 83
End: 2018-04-12
Attending: NURSE PRACTITIONER
Payer: MEDICARE

## 2018-04-12 LAB
INR PPP: 2.98 (ref 0.87–1.13)
PROTHROMBIN TIME: 30.7 SEC (ref 12–14.6)

## 2018-04-12 PROCEDURE — 85610 PROTHROMBIN TIME: CPT

## 2018-04-12 PROCEDURE — 36415 COLL VENOUS BLD VENIPUNCTURE: CPT

## 2018-04-13 ENCOUNTER — ANTICOAGULATION MONITORING (OUTPATIENT)
Dept: VASCULAR LAB | Facility: MEDICAL CENTER | Age: 83
End: 2018-04-13

## 2018-04-13 DIAGNOSIS — I48.0 PAROXYSMAL ATRIAL FIBRILLATION (HCC): ICD-10-CM

## 2018-04-13 DIAGNOSIS — Z86.73 HX-TIA (TRANSIENT ISCHEMIC ATTACK): ICD-10-CM

## 2018-04-13 NOTE — PROGRESS NOTES
Anticoagulation Summary  As of 4/13/2018    INR goal:   2.0-3.0   TTR:   83.0 % (2.9 y)   Today's INR:   2.98 (4/12/2018)   Maintenance plan:   7.5 mg (5 mg x 1.5) on Mon, Wed, Fri; 5 mg (5 mg x 1) all other days   Weekly total:   42.5 mg   Plan last modified:   Jean Ayala PharmD (11/12/2015)   Next INR check:   5/25/2018   Target end date:   Indefinite    Indications    Paroxysmal atrial fibrillation (CMS-HCC) [I48.0]  Hx-TIA (transient ischemic attack) [Z86.73]             Anticoagulation Episode Summary     INR check location:   Outside Lab    Preferred lab:       Send INR reminders to:       Comments:   [facility]      Anticoagulation Care Providers     Provider Role Specialty Phone number    Taz Jc M.D. Referring Cardiology [phone]    Jean Ayala PharmD Responsible          Anticoagulation Patient Findings  Patient Findings     Negatives:   Signs/symptoms of thrombosis, Signs/symptoms of bleeding, Laboratory test error suspected, Change in health, Change in alcohol use, Change in activity, Upcoming invasive procedure, Emergency department visit, Upcoming dental procedure, Missed doses, Extra doses, Change in medications, Change in diet/appetite, Hospital admission, Bruising, Other complaints        Spoke with patient today regarding therapeutic INR of 2.98.  Patient denies any signs/symptoms of bruising or bleeding or any changes in diet and medications.  Instructed patient to call clinic with any questions or concerns.  Pt is to continue with current warfarin dosing regimen.  Follow up in 6 weeks, to reduce risk of adverse events related to this high risk medication,  Warfarin.    Sánchez Ochoa, MarionD

## 2018-04-20 ENCOUNTER — OFFICE VISIT (OUTPATIENT)
Dept: CARDIOLOGY | Facility: MEDICAL CENTER | Age: 83
End: 2018-04-20
Payer: MEDICARE

## 2018-04-20 ENCOUNTER — NON-PROVIDER VISIT (OUTPATIENT)
Dept: CARDIOLOGY | Facility: MEDICAL CENTER | Age: 83
End: 2018-04-20
Payer: MEDICARE

## 2018-04-20 VITALS
OXYGEN SATURATION: 92 % | SYSTOLIC BLOOD PRESSURE: 140 MMHG | WEIGHT: 166 LBS | DIASTOLIC BLOOD PRESSURE: 80 MMHG | HEART RATE: 90 BPM | BODY MASS INDEX: 28.34 KG/M2 | HEIGHT: 64 IN

## 2018-04-20 DIAGNOSIS — I10 ESSENTIAL HYPERTENSION: ICD-10-CM

## 2018-04-20 DIAGNOSIS — Z86.73 HX-TIA (TRANSIENT ISCHEMIC ATTACK): ICD-10-CM

## 2018-04-20 DIAGNOSIS — Z95.0 PRESENCE OF PERMANENT CARDIAC PACEMAKER: ICD-10-CM

## 2018-04-20 DIAGNOSIS — Z86.79 HISTORY OF COMPLETE AV BLOCK: ICD-10-CM

## 2018-04-20 DIAGNOSIS — Z79.01 LONG TERM CURRENT USE OF ANTICOAGULANT THERAPY: ICD-10-CM

## 2018-04-20 DIAGNOSIS — R01.1 UNDIAGNOSED CARDIAC MURMURS: ICD-10-CM

## 2018-04-20 DIAGNOSIS — I48.0 PAROXYSMAL ATRIAL FIBRILLATION (HCC): ICD-10-CM

## 2018-04-20 PROCEDURE — 93280 PM DEVICE PROGR EVAL DUAL: CPT | Performed by: INTERNAL MEDICINE

## 2018-04-20 PROCEDURE — 99214 OFFICE O/P EST MOD 30 MIN: CPT | Performed by: INTERNAL MEDICINE

## 2018-04-20 ASSESSMENT — ENCOUNTER SYMPTOMS
SHORTNESS OF BREATH: 0
DIZZINESS: 0
SORE THROAT: 0
WEAKNESS: 0
PND: 0
COUGH: 0
CLAUDICATION: 0
CHILLS: 0
FEVER: 0
BRUISES/BLEEDS EASILY: 0
ABDOMINAL PAIN: 0
FALLS: 0
NAUSEA: 0
FOCAL WEAKNESS: 0
PALPITATIONS: 0
BLURRED VISION: 0

## 2018-04-21 NOTE — PROGRESS NOTES
Chief Complaint   Patient presents with   • Atrial Fibrillation     follow up       Subjective:   Primitivo Milan is a 89 y.o. 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   • Stroke (CMS-[facility]) 1991   • TIA (transient ischemic attack)      Past Surgical History:   Procedure Laterality Date   • RECOVERY  5/19/2011    Performed by SURGERY, IR-RECOVERY at SURGERY SAME DAY [facility] ORS   • PACEMAKER INSERTION  September 2009    Medtronic Versa [id number] implanted by Dr. Lexa Lopes.   • EYE SURGERY  2006    left eye mass/ cancer/ s/p radiation   • LITHOTRIPSY  1995    left kidney x 3   • PROSTATECTOMY, RADICAL RETRO  1991   • EYE SURGERY       Family History   Problem Relation Age of Onset   • Heart Disease Mother    • Heart Attack Mother    • Heart Disease Father    • Heart Attack Father      Social History     Social History   • Marital status:      Spouse name: N/A   • Number of children: N/A   • Years of education: N/A     Occupational History   • Not on file.     Social History Main Topics   • Smoking status: Never Smoker   • Smokeless tobacco: Never Used   • Alcohol use No  "  • Drug use: No   • Sexual activity: No     Other Topics Concern   • Not on file     Social History Narrative   • No narrative on file     Allergies   Allergen Reactions   • Iodine      Outpatient Encounter Prescriptions as of 4/20/2018   Medication Sig Dispense Refill   • warfarin (COUMADIN) 5 MG Tab TAKE 1 TAB BY MOUTH EVERY DAY. 90 Tab 1   • losartan (COZAAR) 25 MG Tab Take 1 Tab by mouth every day. 90 Tab 3   • diltiazem CD (CARDIZEM CD) 240 MG CAPSULE SR 24 HR TAKE 1 CAP BY MOUTH EVERY DAY. 90 Cap 3   • vitamin D (CHOLECALCIFEROL) 1000 UNIT TABS Take 4,000 Units by mouth every day.       No facility-administered encounter medications on file as of 4/20/2018.      Review of Systems   Constitutional: Negative for chills and fever.   HENT: Negative for sore throat.    Eyes: Negative for blurred vision.   Respiratory: Negative for cough and shortness of breath.    Cardiovascular: Negative for chest pain, palpitations, claudication, leg swelling and PND.   Gastrointestinal: Negative for abdominal pain and nausea.   Musculoskeletal: Negative for falls and joint pain.   Skin: Negative for rash.   Neurological: Negative for dizziness, focal weakness and weakness.   Endo/Heme/Allergies: Does not bruise/bleed easily.        Objective:   /80   Pulse 90   Ht 1.626 m (5' 4\")   Wt 75.3 kg (166 lb)   SpO2 92%   BMI 28.49 kg/m²     Physical Exam   Constitutional: No distress.   HENT:   Mouth/Throat: Oropharynx is clear and moist. No oropharyngeal exudate.   Eyes: No scleral icterus.   Neck: No JVD present.   Cardiovascular: Normal rate.  Exam reveals no gallop and no friction rub.    Murmur heard.  Pulmonary/Chest: No respiratory distress. He has no wheezes. He has no rales.   Abdominal: Soft. Bowel sounds are normal.   Musculoskeletal: He exhibits no edema.   Neurological: He is alert.   Skin: No rash noted. He is not diaphoretic.   Psychiatric: He has a normal mood and affect.    pacemaker check finds very brief " A. fib   Assessment:     1. Essential hypertension     2. Hx-TIA (transient ischemic attack)     3. Paroxysmal atrial fibrillation (CMS-HCC)     4. Long term current use of anticoagulant therapy     5. History of complete AV block     6. Presence of permanent cardiac pacemaker     7. Undiagnosed cardiac murmurs  ECHOCARDIOGRAM COMP W/O CONT       Medical Decision Making:  Today's Assessment / Status / Plan:     It was my pleasure to meet with Mr. Milan.  He continues to do really quite well he does have a murmur on exam seems to be MR we should get a better understanding for overall prognosis    I will see Mr. Milan back in 6 months time and encouraged him to follow up with us over the phone or e-mail using my MyChart as issues arise.    It is my pleasure to participate in the care of Mr. Milan.  Please do not hesitate to contact me with questions or concerns.    Bentley Marshall MD PhD FACC  Cardiologist [facility]

## 2018-05-10 NOTE — PROGRESS NOTES
Outcome: Left Message    Please transfer to Patient Outreach Team at [phone] when patient returns call.    WebIZ Checked & Epic Updated:  no    [facility] Verified: yes    Attempt # AWV PROJECT 1ST ATTEMPT

## 2018-05-24 ENCOUNTER — HOSPITAL ENCOUNTER (OUTPATIENT)
Dept: LAB | Facility: MEDICAL CENTER | Age: 83
End: 2018-05-24
Attending: NURSE PRACTITIONER
Payer: MEDICARE

## 2018-05-24 LAB
INR PPP: 3.73 (ref 0.87–1.13)
PROTHROMBIN TIME: 36.7 SEC (ref 12–14.6)

## 2018-05-24 PROCEDURE — 85610 PROTHROMBIN TIME: CPT

## 2018-05-24 PROCEDURE — 36415 COLL VENOUS BLD VENIPUNCTURE: CPT

## 2018-05-25 ENCOUNTER — ANTICOAGULATION MONITORING (OUTPATIENT)
Dept: VASCULAR LAB | Facility: MEDICAL CENTER | Age: 83
End: 2018-05-25

## 2018-05-25 DIAGNOSIS — Z86.73 HX-TIA (TRANSIENT ISCHEMIC ATTACK): ICD-10-CM

## 2018-05-25 DIAGNOSIS — I48.0 PAROXYSMAL ATRIAL FIBRILLATION (HCC): ICD-10-CM

## 2018-05-25 NOTE — PROGRESS NOTES
Anticoagulation Summary  As of 5/25/2018    INR goal:   2.0-3.0   TTR:   79.9 % (3 y)   Today's INR:   3.73! (5/24/2018)   Warfarin maintenance plan:   7.5 mg (5 mg x 1.5) on Mon, Wed, Fri; 5 mg (5 mg x 1) all other days   Weekly warfarin total:   42.5 mg   Plan last modified:   Jean Ayala PharmD (11/12/2015)   Next INR check:   6/7/2018   Target end date:   Indefinite    Indications    Paroxysmal atrial fibrillation (HCC) [I48.0]  Hx-TIA (transient ischemic attack) [Z86.73]             Anticoagulation Episode Summary     INR check location:   Outside Lab    Preferred lab:       Send INR reminders to:       Comments:   [facility]      Anticoagulation Care Providers     Provider Role Specialty Phone number    Taz Jc M.D. Referring Cardiology [phone]    Marion MackenzieD Responsible          Anticoagulation Patient Findings  Patient Findings     Negatives:   Signs/symptoms of thrombosis, Signs/symptoms of bleeding, Laboratory test error suspected, Change in health, Change in alcohol use, Change in activity, Upcoming invasive procedure, Emergency department visit, Upcoming dental procedure, Missed doses, Extra doses, Change in medications, Change in diet/appetite, Hospital admission, Bruising, Other complaints        Spoke with the patient on the phone today, reporting a SUPRA-therapeutic INR of 3.73.  Confirmed the current warfarin dosing regimen and patient compliance. Patient denies any interval changes to diet and/or medications. Patient denies any signs/symptoms of bleeding or clotting. Patient already took dose for today, so will have him HOLD dose tomorrow, and will have him check back again in 2 weeks.    Jake SchultzD

## 2018-06-04 DIAGNOSIS — I10 ESSENTIAL HYPERTENSION: ICD-10-CM

## 2018-06-05 RX ORDER — DILTIAZEM HYDROCHLORIDE 240 MG/1
240 CAPSULE, COATED, EXTENDED RELEASE ORAL
Qty: 90 CAP | Refills: 3 | Status: SHIPPED | OUTPATIENT
Start: 2018-06-05 | End: 2019-01-01 | Stop reason: SDUPTHER

## 2018-06-07 ENCOUNTER — HOSPITAL ENCOUNTER (OUTPATIENT)
Dept: LAB | Facility: MEDICAL CENTER | Age: 83
End: 2018-06-07
Attending: NURSE PRACTITIONER
Payer: MEDICARE

## 2018-06-07 LAB
INR PPP: 2.9 (ref 0.87–1.13)
PROTHROMBIN TIME: 30 SEC (ref 12–14.6)

## 2018-06-07 PROCEDURE — 85610 PROTHROMBIN TIME: CPT

## 2018-06-07 PROCEDURE — 36415 COLL VENOUS BLD VENIPUNCTURE: CPT

## 2018-06-08 ENCOUNTER — ANTICOAGULATION MONITORING (OUTPATIENT)
Dept: VASCULAR LAB | Facility: MEDICAL CENTER | Age: 83
End: 2018-06-08

## 2018-06-08 DIAGNOSIS — Z86.73 HX-TIA (TRANSIENT ISCHEMIC ATTACK): ICD-10-CM

## 2018-06-08 DIAGNOSIS — I48.0 PAROXYSMAL ATRIAL FIBRILLATION (HCC): ICD-10-CM

## 2018-06-08 NOTE — PROGRESS NOTES
Anticoagulation Summary  As of 6/8/2018    INR goal:   2.0-3.0   TTR:   79.0 % (3 y)   Today's INR:   2.90 (6/7/2018)   Warfarin maintenance plan:   7.5 mg (5 mg x 1.5) on Mon, Wed, Fri; 5 mg (5 mg x 1) all other days   Weekly warfarin total:   42.5 mg   Plan last modified:   Jean Ayala PharmD (11/12/2015)   Next INR check:   6/28/2018   Target end date:   Indefinite    Indications    Paroxysmal atrial fibrillation (HCC) [I48.0]  Hx-TIA (transient ischemic attack) [Z86.73]             Anticoagulation Episode Summary     INR check location:   Outside Lab    Preferred lab:       Send INR reminders to:       Comments:   [facility]      Anticoagulation Care Providers     Provider Role Specialty Phone number    Taz Jc M.D. Referring Cardiology [phone]    Jean Ayala PharmD Responsible          Anticoagulation Patient Findings    Left a message on the patient's voicemail today, informing the patient of a therapeutic INR of 2.9.  Instructed patient to call the clinic with any changes to diet or medications, with any questions/concerns, or with any signs/sx of bleeding or clotting.  Patient instructed to continue with his current dosing regimen, and asked to follow up again in 3 weekls.     Jake Webber PharmD

## 2018-06-28 ENCOUNTER — HOSPITAL ENCOUNTER (OUTPATIENT)
Dept: LAB | Facility: MEDICAL CENTER | Age: 83
End: 2018-06-28
Attending: NURSE PRACTITIONER
Payer: MEDICARE

## 2018-06-28 LAB
INR PPP: 3.33 (ref 0.87–1.13)
PROTHROMBIN TIME: 33.5 SEC (ref 12–14.6)

## 2018-06-28 PROCEDURE — 36415 COLL VENOUS BLD VENIPUNCTURE: CPT

## 2018-06-28 PROCEDURE — 85610 PROTHROMBIN TIME: CPT

## 2018-06-29 ENCOUNTER — ANTICOAGULATION MONITORING (OUTPATIENT)
Dept: VASCULAR LAB | Facility: MEDICAL CENTER | Age: 83
End: 2018-06-29

## 2018-06-29 DIAGNOSIS — Z86.73 HX-TIA (TRANSIENT ISCHEMIC ATTACK): ICD-10-CM

## 2018-06-29 DIAGNOSIS — I48.0 PAROXYSMAL ATRIAL FIBRILLATION (HCC): ICD-10-CM

## 2018-06-29 NOTE — PROGRESS NOTES
Anticoagulation Summary  As of 6/29/2018    INR goal:   2.0-3.0   TTR:   78.0 % (3.1 y)   Today's INR:   3.33! (6/28/2018)   Warfarin maintenance plan:   7.5 mg (5 mg x 1.5) on Mon, Wed; 5 mg (5 mg x 1) all other days   Weekly warfarin total:   40 mg   Plan last modified:   Mikaela Cotter PharmJUAN (6/29/2018)   Next INR check:   7/19/2018   Target end date:   Indefinite    Indications    Paroxysmal atrial fibrillation (HCC) [I48.0]  Hx-TIA (transient ischemic attack) [Z86.73]             Anticoagulation Episode Summary     INR check location:   Outside Lab    Preferred lab:       Send INR reminders to:       Comments:   [facility]      Anticoagulation Care Providers     Provider Role Specialty Phone number    Taz Jc M.D. Referring Cardiology [phone]    Marion MackenzieD Responsible          Anticoagulation Patient Findings    Spoke with patient.  INR is SUPRA therapeutic.   Pt denies any unusual s/s of bleeding, bruising, clotting or any changes to diet or medications. Denies any etoh, cranberries, supplements, or illness.   Pt verifies warfarin weekly dosing.     Will have pt take a reduced dose of 2.5mg tomorrow (pt already took his dose today), and then start a reduced weekly dose of warfarin    Repeat INR in 3 week(s).     Mikaela Cotter, PharmD

## 2018-07-19 ENCOUNTER — HOSPITAL ENCOUNTER (OUTPATIENT)
Dept: LAB | Facility: MEDICAL CENTER | Age: 83
End: 2018-07-19
Attending: NURSE PRACTITIONER
Payer: MEDICARE

## 2018-07-19 DIAGNOSIS — I48.0 PAROXYSMAL ATRIAL FIBRILLATION (HCC): ICD-10-CM

## 2018-07-19 LAB
INR PPP: 3.23 (ref 0.87–1.13)
PROTHROMBIN TIME: 32.7 SEC (ref 12–14.6)

## 2018-07-19 PROCEDURE — 36415 COLL VENOUS BLD VENIPUNCTURE: CPT

## 2018-07-19 PROCEDURE — 85610 PROTHROMBIN TIME: CPT

## 2018-07-20 ENCOUNTER — ANTICOAGULATION MONITORING (OUTPATIENT)
Dept: VASCULAR LAB | Facility: MEDICAL CENTER | Age: 83
End: 2018-07-20

## 2018-07-20 DIAGNOSIS — Z86.73 HX-TIA (TRANSIENT ISCHEMIC ATTACK): ICD-10-CM

## 2018-07-20 DIAGNOSIS — I48.0 PAROXYSMAL ATRIAL FIBRILLATION (HCC): ICD-10-CM

## 2018-07-20 NOTE — PROGRESS NOTES
Anticoagulation Summary  As of 7/20/2018    INR goal:   2.0-3.0   TTR:   76.6 % (3.1 y)   Today's INR:   3.23! (7/19/2018)   Warfarin maintenance plan:   7.5 mg (5 mg x 1.5) on Wed; 5 mg (5 mg x 1) all other days   Weekly warfarin total:   37.5 mg   Plan last modified:   Ibeth Romero (7/20/2018)   Next INR check:   8/2/2018   Target end date:   Indefinite    Indications    Paroxysmal atrial fibrillation (HCC) [I48.0]  Hx-TIA (transient ischemic attack) [Z86.73]             Anticoagulation Episode Summary     INR check location:   Outside Lab    Preferred lab:       Send INR reminders to:       Comments:   [facility]      Anticoagulation Care Providers     Provider Role Specialty Phone number    Taz Jc M.D. Referring Cardiology [phone]    Jean Ayala, PharmD Responsible          Anticoagulation Patient Findings     HPI:  Primitivo Milan, on anticoagulation therapy with warfarin for afib and TIA  Changes to current medical/health status since last appt: none  Denies signs/symptoms of bleeding and/or thrombosis since the last appt.    Denies any interval changes to diet  Denies any interval changes to medications since last appt.   Denies any complications or cost restrictions with current therapy.     A/P   INR  SUPRA-therapeutic.     Patient did report that he decreased his dose from last phone call. Denied any changes to diet or medications. Patient already took dose of warfarin this AM so instructed to take 2.5 mg tomorrow then start decreased weekly regimen of 7.5 mg on Wednesdays and 5 mg all other days.    Next INR in 2 week(s).    Ibeth Romero, PharmD

## 2018-08-02 ENCOUNTER — APPOINTMENT (RX ONLY)
Dept: URBAN - METROPOLITAN AREA CLINIC 4 | Facility: CLINIC | Age: 83
Setting detail: DERMATOLOGY
End: 2018-08-02

## 2018-08-02 ENCOUNTER — HOSPITAL ENCOUNTER (OUTPATIENT)
Dept: LAB | Facility: MEDICAL CENTER | Age: 83
End: 2018-08-02
Attending: NURSE PRACTITIONER
Payer: MEDICARE

## 2018-08-02 DIAGNOSIS — L57.0 ACTINIC KERATOSIS: ICD-10-CM

## 2018-08-02 DIAGNOSIS — D18.0 HEMANGIOMA: ICD-10-CM

## 2018-08-02 DIAGNOSIS — Z85.820 PERSONAL HISTORY OF MALIGNANT MELANOMA OF SKIN: ICD-10-CM

## 2018-08-02 DIAGNOSIS — L81.4 OTHER MELANIN HYPERPIGMENTATION: ICD-10-CM

## 2018-08-02 DIAGNOSIS — D22 MELANOCYTIC NEVI: ICD-10-CM

## 2018-08-02 DIAGNOSIS — L82.1 OTHER SEBORRHEIC KERATOSIS: ICD-10-CM

## 2018-08-02 DIAGNOSIS — D485 NEOPLASM OF UNCERTAIN BEHAVIOR OF SKIN: ICD-10-CM

## 2018-08-02 PROBLEM — D18.01 HEMANGIOMA OF SKIN AND SUBCUTANEOUS TISSUE: Status: ACTIVE | Noted: 2018-08-02

## 2018-08-02 PROBLEM — D22.62 MELANOCYTIC NEVI OF LEFT UPPER LIMB, INCLUDING SHOULDER: Status: ACTIVE | Noted: 2018-08-02

## 2018-08-02 PROBLEM — D22.5 MELANOCYTIC NEVI OF TRUNK: Status: ACTIVE | Noted: 2018-08-02

## 2018-08-02 PROBLEM — D22.61 MELANOCYTIC NEVI OF RIGHT UPPER LIMB, INCLUDING SHOULDER: Status: ACTIVE | Noted: 2018-08-02

## 2018-08-02 PROBLEM — D48.5 NEOPLASM OF UNCERTAIN BEHAVIOR OF SKIN: Status: ACTIVE | Noted: 2018-08-02

## 2018-08-02 PROBLEM — D22.39 MELANOCYTIC NEVI OF OTHER PARTS OF FACE: Status: ACTIVE | Noted: 2018-08-02

## 2018-08-02 LAB
INR PPP: 2.74 (ref 0.87–1.13)
PROTHROMBIN TIME: 28.7 SEC (ref 12–14.6)

## 2018-08-02 PROCEDURE — ? BIOPSY BY SHAVE METHOD

## 2018-08-02 PROCEDURE — ? LIQUID NITROGEN

## 2018-08-02 PROCEDURE — 99213 OFFICE O/P EST LOW 20 MIN: CPT | Mod: 25

## 2018-08-02 PROCEDURE — 36415 COLL VENOUS BLD VENIPUNCTURE: CPT

## 2018-08-02 PROCEDURE — 17000 DESTRUCT PREMALG LESION: CPT

## 2018-08-02 PROCEDURE — ? COUNSELING

## 2018-08-02 PROCEDURE — ? SUNSCREEN RECOMMENDATIONS

## 2018-08-02 PROCEDURE — 69100 BIOPSY OF EXTERNAL EAR: CPT | Mod: 59

## 2018-08-02 PROCEDURE — 17003 DESTRUCT PREMALG LES 2-14: CPT

## 2018-08-02 PROCEDURE — 85610 PROTHROMBIN TIME: CPT

## 2018-08-02 ASSESSMENT — LOCATION DETAILED DESCRIPTION DERM
LOCATION DETAILED: RIGHT INFERIOR TEMPLE
LOCATION DETAILED: STERNAL NOTCH
LOCATION DETAILED: RIGHT FOREHEAD
LOCATION DETAILED: LEFT SUPERIOR MEDIAL UPPER BACK
LOCATION DETAILED: LEFT INFERIOR CENTRAL MALAR CHEEK
LOCATION DETAILED: RIGHT SUPERIOR UPPER BACK
LOCATION DETAILED: RIGHT LATERAL SUPERIOR CHEST
LOCATION DETAILED: LEFT MID-UPPER BACK
LOCATION DETAILED: LEFT POSTERIOR EAR
LOCATION DETAILED: LEFT INFERIOR HELIX
LOCATION DETAILED: LEFT SUPERIOR CENTRAL MALAR CHEEK
LOCATION DETAILED: RIGHT INFERIOR LATERAL MALAR CHEEK
LOCATION DETAILED: LEFT PROXIMAL DORSAL FOREARM
LOCATION DETAILED: RIGHT LATERAL TEMPLE
LOCATION DETAILED: SUPERIOR MID FOREHEAD
LOCATION DETAILED: LEFT MEDIAL UPPER BACK
LOCATION DETAILED: LEFT ANTIHELIX
LOCATION DETAILED: RIGHT LATERAL MALAR CHEEK
LOCATION DETAILED: MID TRAPEZIAL NECK
LOCATION DETAILED: LEFT CENTRAL FRONTAL SCALP
LOCATION DETAILED: LEFT CENTRAL MALAR CHEEK
LOCATION DETAILED: RIGHT PROXIMAL DORSAL FOREARM

## 2018-08-02 ASSESSMENT — LOCATION SIMPLE DESCRIPTION DERM
LOCATION SIMPLE: LEFT FOREARM
LOCATION SIMPLE: LEFT UPPER BACK
LOCATION SIMPLE: SUPERIOR FOREHEAD
LOCATION SIMPLE: RIGHT TEMPLE
LOCATION SIMPLE: RIGHT FOREHEAD
LOCATION SIMPLE: CHEST
LOCATION SIMPLE: LEFT SCALP
LOCATION SIMPLE: LEFT EAR
LOCATION SIMPLE: RIGHT FOREARM
LOCATION SIMPLE: RIGHT UPPER BACK
LOCATION SIMPLE: LEFT CHEEK
LOCATION SIMPLE: TRAPEZIAL NECK
LOCATION SIMPLE: RIGHT CHEEK

## 2018-08-02 ASSESSMENT — LOCATION ZONE DERM
LOCATION ZONE: SCALP
LOCATION ZONE: ARM
LOCATION ZONE: EAR
LOCATION ZONE: TRUNK
LOCATION ZONE: NECK
LOCATION ZONE: FACE

## 2018-08-02 NOTE — PROCEDURE: BIOPSY BY SHAVE METHOD
Biopsy Method: Personna blade
Billing Type: Third-Party Bill
Anesthesia Volume In Cc: 2
Bill For Surgical Tray: no
Was A Bandage Applied: Yes
Silver Nitrate Text: The wound bed was treated with silver nitrate after the biopsy was performed.
Depth Of Biopsy: dermis
Consent: Written consent was obtained and risks were reviewed including but not limited to scarring, infection, bleeding, scabbing, incomplete removal, nerve damage and allergy to anesthesia.
Additional Anesthesia Volume In Cc (Will Not Render If 0): 0
Biopsy Type: H and E
Dressing: bandage
Anesthesia Type: 1% lidocaine with epinephrine
Electrodesiccation Text: The wound bed was treated with electrodesiccation after the biopsy was performed.
Lab: [id number]
Post-Care Instructions: I reviewed with the patient in detail post-care instructions. Patient is to keep the biopsy site dry overnight, and then apply bacitracin twice daily until healed. Patient may apply hydrogen peroxide soaks to remove any crusting.
Notification Instructions: Patient will be notified of biopsy results. However, patient instructed to call the office if not contacted within 2 weeks.
Detail Level: Detailed
Type Of Destruction Used: Electrodesiccation and Curettage
Hemostasis: Aluminum Chloride and Electrocautery
Lab Facility: 
Electrodesiccation And Curettage Text: The wound bed was treated with electrodesiccation and curettage after the biopsy was performed.
Cryotherapy Text: The wound bed was treated with cryotherapy after the biopsy was performed.
Wound Care: Vaseline
Curettage Text: The wound bed was treated with curettage after the biopsy was performed.

## 2018-08-02 NOTE — PROCEDURE: LIQUID NITROGEN
Render Post-Care Instructions In Note?: no
Duration Of Freeze Thaw-Cycle (Seconds): 3
Number Of Freeze-Thaw Cycles: 2 freeze-thaw cycles
Post-Care Instructions: I reviewed with the patient in detail post-care instructions. Patient is to wear sunprotection, and avoid picking at any of the treated lesions. Pt may apply Vaseline to crusted or scabbing areas.
Detail Level: Simple
Consent: The patient's consent was obtained including but not limited to risks of crusting, scabbing, blistering, scarring, darker or lighter pigmentary change, recurrence, incomplete removal and infection.

## 2018-08-03 ENCOUNTER — ANTICOAGULATION MONITORING (OUTPATIENT)
Dept: VASCULAR LAB | Facility: MEDICAL CENTER | Age: 83
End: 2018-08-03

## 2018-08-03 DIAGNOSIS — I48.0 PAROXYSMAL ATRIAL FIBRILLATION (HCC): ICD-10-CM

## 2018-08-03 DIAGNOSIS — Z86.73 HX-TIA (TRANSIENT ISCHEMIC ATTACK): ICD-10-CM

## 2018-08-03 NOTE — PROGRESS NOTES
OP Anticoagulation Telephone Note    Date: 8/3/2018  Anticoagulation Summary  As of 8/3/2018    INR goal:   2.0-3.0   TTR:   76.3 % (3.2 y)   Today's INR:   2.74 (8/2/2018)   Warfarin maintenance plan:   7.5 mg (5 mg x 1.5) on Wed; 5 mg (5 mg x 1) all other days   Weekly warfarin total:   37.5 mg   No change documented:   Theresa Amanda, Med Ass't   Plan last modified:   Ibeth Romero (7/20/2018)   Next INR check:   8/16/2018   Target end date:   Indefinite    Indications    Paroxysmal atrial fibrillation (HCC) [I48.0]  Hx-TIA (transient ischemic attack) [Z86.73]             Anticoagulation Episode Summary     INR check location:   Outside Lab    Preferred lab:       Send INR reminders to:       Comments:   [facility]      Anticoagulation Care Providers     Provider Role Specialty Phone number    Taz Jc M.D. Referring Cardiology [phone]    Jean Ayala, PharmD Responsible          Anticoagulation Patient Findings  Patient Findings     Negatives:   Signs/symptoms of thrombosis, Signs/symptoms of bleeding, Laboratory test error suspected, Change in health, Change in alcohol use, Change in activity, Upcoming invasive procedure, Emergency department visit, Upcoming dental procedure, Missed doses, Extra doses, Change in medications, Change in diet/appetite, Hospital admission, Bruising, Other complaints      Plan:  Left message on patient's answering machine/ voicemail. Instructed patient to call back with any concerns regarding any unusual bleeding or bruising, any medication or diet changes, or any signs or symptoms of thrombosis. Instructed patient to resume medication as outlined above. Patient to follow up in 2 weeks.     Theresa Amanda, Medical Assistant    I have reviewed and am in agreement with the above stated plan on 8-3-18.  Sánchez Ochoa, MarionD

## 2018-08-09 ENCOUNTER — HOSPITAL ENCOUNTER (OUTPATIENT)
Dept: LAB | Facility: MEDICAL CENTER | Age: 83
End: 2018-08-09
Attending: NURSE PRACTITIONER
Payer: MEDICARE

## 2018-08-09 LAB
INR PPP: 2.66 (ref 0.87–1.13)
INR PPP: 2.66 (ref 2–3.5)
PROTHROMBIN TIME: 28 SEC (ref 12–14.6)

## 2018-08-09 PROCEDURE — 85610 PROTHROMBIN TIME: CPT

## 2018-08-09 PROCEDURE — 36415 COLL VENOUS BLD VENIPUNCTURE: CPT

## 2018-08-10 ENCOUNTER — ANTICOAGULATION MONITORING (OUTPATIENT)
Dept: VASCULAR LAB | Facility: MEDICAL CENTER | Age: 83
End: 2018-08-10

## 2018-08-10 DIAGNOSIS — Z86.73 HX-TIA (TRANSIENT ISCHEMIC ATTACK): ICD-10-CM

## 2018-08-10 DIAGNOSIS — I48.0 PAROXYSMAL ATRIAL FIBRILLATION (HCC): ICD-10-CM

## 2018-08-10 NOTE — PROGRESS NOTES
OP Anticoagulation Telephone Note    Date: 8/10/2018  Anticoagulation Summary  As of 8/10/2018    INR goal:   2.0-3.0   TTR:   76.4 % (3.2 y)   Today's INR:   2.66 (8/9/2018)   Warfarin maintenance plan:   7.5 mg (5 mg x 1.5) on Wed; 5 mg (5 mg x 1) all other days   Weekly warfarin total:   37.5 mg   No change documented:   Theresa Amanda, Med Ass't   Plan last modified:   Ibeth Romero, PharmD (7/20/2018)   Next INR check:   8/24/2018   Target end date:   Indefinite    Indications    Paroxysmal atrial fibrillation (HCC) [I48.0]  Hx-TIA (transient ischemic attack) [Z86.73]             Anticoagulation Episode Summary     INR check location:   Outside Lab    Preferred lab:       Send INR reminders to:       Comments:   [facility]      Anticoagulation Care Providers     Provider Role Specialty Phone number    Taz Jc M.D. Referring Cardiology [phone]    Jean Ayala, PharmD Responsible          Anticoagulation Patient Findings  Patient Findings     Negatives:   Signs/symptoms of thrombosis, Signs/symptoms of bleeding, Laboratory test error suspected, Change in health, Change in alcohol use, Change in activity, Upcoming invasive procedure, Emergency department visit, Upcoming dental procedure, Missed doses, Extra doses, Change in medications, Change in diet/appetite, Hospital admission, Bruising, Other complaints      Plan:  Spoke with patient on the phone. Patient is therapeutic today. Patient denies any changes in medications or diet. Patient denies any signs or symptoms of bleeding or clotting. Instructed patient to call clinic if any unusual bleeding or bruising occurs. Will continue dosing as outlined above. Will follow-up with patient in 2 weeks.    Theresa Amanda, Medical Assistant    08/10/18  Mikey Cotter, PharmD

## 2018-08-23 ENCOUNTER — HOSPITAL ENCOUNTER (OUTPATIENT)
Dept: LAB | Facility: MEDICAL CENTER | Age: 83
End: 2018-08-23
Attending: NURSE PRACTITIONER
Payer: MEDICARE

## 2018-08-23 PROCEDURE — 36415 COLL VENOUS BLD VENIPUNCTURE: CPT

## 2018-08-23 PROCEDURE — 85610 PROTHROMBIN TIME: CPT

## 2018-08-24 LAB
INR PPP: 2.93 (ref 0.87–1.13)
PROTHROMBIN TIME: 30.3 SEC (ref 12–14.6)

## 2018-08-27 ENCOUNTER — ANTICOAGULATION MONITORING (OUTPATIENT)
Dept: VASCULAR LAB | Facility: MEDICAL CENTER | Age: 83
End: 2018-08-27

## 2018-08-27 DIAGNOSIS — Z86.73 HX-TIA (TRANSIENT ISCHEMIC ATTACK): ICD-10-CM

## 2018-08-27 DIAGNOSIS — I48.0 PAROXYSMAL ATRIAL FIBRILLATION (HCC): ICD-10-CM

## 2018-08-27 NOTE — PROGRESS NOTES
OP Telephone Anticoagulation Service Note    Date: 8/27/2018      Anticoagulation Summary  As of 8/27/2018    INR goal:   2.0-3.0   TTR:   76.7 % (3.2 y)   Today's INR:   2.93 (8/23/2018)   Warfarin maintenance plan:   7.5 mg (5 mg x 1.5) on Wed; 5 mg (5 mg x 1) all other days   Weekly warfarin total:   37.5 mg   Plan last modified:   Ibeth Romero PharmD (7/20/2018)   Next INR check:   9/12/2018   Target end date:   Indefinite    Indications    Paroxysmal atrial fibrillation (HCC) [I48.0]  Hx-TIA (transient ischemic attack) [Z86.73]             Anticoagulation Episode Summary     INR check location:   Outside Lab    Preferred lab:       Send INR reminders to:       Comments:   [facility]      Anticoagulation Care Providers     Provider Role Specialty Phone number    Taz Jc M.D. Referring Cardiology [phone]    Marion MackenzieD Responsible          Anticoagulation Patient Findings        Plan: Spoke with patient on the phone. Patient is therapeutic today. Confirmed dosing. No missed tablets in the last week. Patient denies any changes in medications or diet. He does report he was started on vitamin D supplement.  Patient reports easy bruising on his arms but they are healing. . Instructed patient to call clinic if any unusual bleeding or bruising occurs. Will continue dosing as outlined. Will follow-up with patient in 2 week(s).        Rashmi Lerma, MarionD

## 2018-09-12 ENCOUNTER — HOSPITAL ENCOUNTER (OUTPATIENT)
Dept: LAB | Facility: MEDICAL CENTER | Age: 83
End: 2018-09-12
Attending: NURSE PRACTITIONER
Payer: MEDICARE

## 2018-09-12 DIAGNOSIS — I48.0 PAROXYSMAL ATRIAL FIBRILLATION (HCC): ICD-10-CM

## 2018-09-12 LAB
INR PPP: 2.26 (ref 0.87–1.13)
PROTHROMBIN TIME: 24.6 SEC (ref 12–14.6)

## 2018-09-12 PROCEDURE — 85610 PROTHROMBIN TIME: CPT

## 2018-09-12 PROCEDURE — 36415 COLL VENOUS BLD VENIPUNCTURE: CPT

## 2018-09-13 ENCOUNTER — ANTICOAGULATION MONITORING (OUTPATIENT)
Dept: VASCULAR LAB | Facility: MEDICAL CENTER | Age: 83
End: 2018-09-13

## 2018-09-13 DIAGNOSIS — I48.0 PAROXYSMAL ATRIAL FIBRILLATION (HCC): ICD-10-CM

## 2018-09-13 DIAGNOSIS — Z86.73 HX-TIA (TRANSIENT ISCHEMIC ATTACK): ICD-10-CM

## 2018-09-13 NOTE — PROGRESS NOTES
Anticoagulation Summary  As of 9/13/2018    INR goal:   2.0-3.0   TTR:   77.1 % (3.3 y)   Today's INR:   2.26 (9/12/2018)   Warfarin maintenance plan:   7.5 mg (5 mg x 1.5) on Wed; 5 mg (5 mg x 1) all other days   Weekly warfarin total:   37.5 mg   No change documented:   Alanis Thomson, Pharmacy Intern   Plan last modified:   Ibeth Romero, PharmD (7/20/2018)   Next INR check:   10/4/2018   Target end date:   Indefinite    Indications    Paroxysmal atrial fibrillation (HCC) [I48.0]  Hx-TIA (transient ischemic attack) [Z86.73]             Anticoagulation Episode Summary     INR check location:   Outside Lab    Preferred lab:       Send INR reminders to:       Comments:   [facility]      Anticoagulation Care Providers     Provider Role Specialty Phone number    Taz Jc M.D. Referring Cardiology [phone]    Marion MackenzieD Responsible          Anticoagulation Patient Findings    Spoke with pt over the phone regarding therapeutic INR of 2.2.  Pt denies any s/s of bleeding. Pt denies recent changes to medications or diet.  Pt to continue with current dosing regimen as listed.  Next INR scheduled 10/4/18.    Rashmi Lerma PharmD  I have reviewed and agree with the plan above on  9/13/2018    Rashmi Lerma, Pharm D

## 2018-09-21 ENCOUNTER — OFFICE VISIT (OUTPATIENT)
Dept: MEDICAL GROUP | Facility: PHYSICIAN GROUP | Age: 83
End: 2018-09-21
Payer: MEDICARE

## 2018-09-21 VITALS
WEIGHT: 166 LBS | HEIGHT: 64 IN | TEMPERATURE: 97.7 F | BODY MASS INDEX: 28.34 KG/M2 | HEART RATE: 62 BPM | SYSTOLIC BLOOD PRESSURE: 120 MMHG | OXYGEN SATURATION: 94 % | RESPIRATION RATE: 16 BRPM | DIASTOLIC BLOOD PRESSURE: 90 MMHG

## 2018-09-21 DIAGNOSIS — Z95.0 PRESENCE OF PERMANENT CARDIAC PACEMAKER: ICD-10-CM

## 2018-09-21 DIAGNOSIS — I10 ESSENTIAL HYPERTENSION: ICD-10-CM

## 2018-09-21 DIAGNOSIS — Z85.46 PERSONAL HISTORY OF MALIGNANT NEOPLASM OF PROSTATE: ICD-10-CM

## 2018-09-21 DIAGNOSIS — C85.99 EXTRANODAL LYMPHOMA (HCC): ICD-10-CM

## 2018-09-21 DIAGNOSIS — H91.93 HEARING PROBLEM OF BOTH EARS: ICD-10-CM

## 2018-09-21 DIAGNOSIS — N28.89 RENAL MASS, LEFT: ICD-10-CM

## 2018-09-21 DIAGNOSIS — C43.9 MALIGNANT MELANOMA, UNSPECIFIED SITE (HCC): ICD-10-CM

## 2018-09-21 DIAGNOSIS — R26.89 BALANCE PROBLEM: ICD-10-CM

## 2018-09-21 PROBLEM — I48.91 ATRIAL FIBRILLATION (HCC): Status: ACTIVE | Noted: 2018-09-21

## 2018-09-21 PROCEDURE — 99214 OFFICE O/P EST MOD 30 MIN: CPT | Performed by: INTERNAL MEDICINE

## 2018-09-21 RX ORDER — CALCIUM CARBONATE 300MG(750)
400 TABLET,CHEWABLE ORAL DAILY
COMMUNITY

## 2018-09-21 NOTE — ASSESSMENT & PLAN NOTE
Says he isn't as steady as he used to be. Denies falls. Still works in his yard, rakes in the yard.

## 2018-09-21 NOTE — PROGRESS NOTES
[facility]ARE [facility] NEW PATIENT H&P  Chief Complaint   Patient presents with   • Hearing Problem     Hearing Aid Problem    • Loss Of Balance   • Hypertension     History of Present Illness     Atrial fibrillation (HCC)  On coumadin. Following with Dr. Marshall.     Essential hypertension  Controlled on losartan 25 mg and diltiazem 240 mg ER capsule.     Extranodal lymphoma (CMS-HCC) (HCC)  Says this is biopsy proven but he doesn't wish to continue surveillance/treatment.     Malignant melanoma (CMS-HCC)  Following with dermatology and will see them next 9/25/2018.     Personal history of malignant neoplasm of prostate  Following with urology.     Renal mass, left  Following with urology and has annual ultrasounds.     Hearing problem of both ears  Has bilateral hearing aids. Says that the left ear sometimes hurts from the noise and he has to remove the hearing aid sometimes. His daughter says that when they are in they hurt him.     Balance problem  Says he isn't as steady as he used to be. Denies falls. Still works in his yard, rakes in the yard.     Presence of permanent cardiac pacemaker  Followed by cardiologist Dr. Marshall.     Current Outpatient Prescriptions   Medication Sig Dispense Refill   • Magnesium 400 MG Tab Take  by mouth.     • Calcium Carbonate (CALCIUM 600 PO) Take  by mouth.     • warfarin (COUMADIN) 5 MG Tab TAKE 1 TAB BY MOUTH EVERY DAY. 90 Tab 0   • DILTIAZem CD (CARDIZEM CD) 240 MG CAPSULE SR 24 HR TAKE 1 CAP BY MOUTH EVERY DAY. 90 Cap 3   • losartan (COZAAR) 25 MG Tab Take 1 Tab by mouth every day. 90 Tab 3   • vitamin D (CHOLECALCIFEROL) 1000 UNIT TABS Take 4,000 Units by mouth every day.       No current facility-administered medications for this visit.        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 Lexa Lopes.    • Renal disorder 1970    stones   • Sleep apnea     Cannot use CPAP machine.   • Stroke (HCC)    • TIA (transient ischemic attack)      Past Surgical History:   Procedure Laterality Date   • RECOVERY  2011    Performed by SURGERY, IR-RECOVERY at SURGERY SAME DAY [facility]   • PACEMAKER INSERTION  2009    Medtronic Versa [id number] implanted by Dr. Lexa Lopes.   • EYE SURGERY      left eye mass/ cancer/ s/p radiation   • LITHOTRIPSY      left kidney x 3   • PROSTATECTOMY, RADICAL RETRO     • EYE SURGERY       Social History   Substance Use Topics   • Smoking status: Never Smoker   • Smokeless tobacco: Never Used   • Alcohol use No     Social History     Social History Narrative    Retired from [employer]      Family History   Problem Relation Age of Onset   • Heart Disease Mother    • Heart Attack Mother    • Heart Disease Father    • Heart Attack Father      Family Status   Relation Status   • Mo    • Fa      Allergies: Iodine    ROS  Constitutional: Negative for fatigue/generalized weakness.   HEENT: Negative for  vision changes, hearing changes    Respiratory: Negative for shortness of breath  Cardiovascular: Negative for chest pain, palpitations  Gastrointestinal: Negative for blood in stool, constipation, diarrhea  Genitourinary: Negative for dysuria, polyuria  Musculoskeletal: Negative for myalgias, back pain, and joint pain.   Skin: Negative for  "rash  Neurological: Negative for numbness, tingling  Psychiatric/Behavioral: Negative for depression, anxiety       Objective   Blood pressure 120/90, pulse 62, temperature 36.5 °C (97.7 °F), temperature source Temporal, resp. rate 16, height 1.626 m (5' 4\"), weight 75.3 kg (166 lb), SpO2 94 %. Body mass index is 28.49 kg/m².    General: Alert, oriented. In no acute distress   HEET: EOMI, PERRL, conjunctiva non-injected, sclera non-icteric.  Nares patent with no significant congestion or drainage.  Yohana pinnae, external auditory canals, TM pearly gray with normal light reflex bilaterally.Oral mucous membranes pink and moist with no lesions.  Neck: supple with no cervical, subclavicular lymphadenopathy, JVD, palpable thyroid nodules   Lungs: clear to auscultation bilaterally with good excursion.  CV: regular rate and rhythm.  Abdomen soft, non-distended, non-tender with normal bowel sounds. No hepatosplenomegaly, no masses palpated  Skin: no lesions. Warm, dry   Psychiatric: appropriate mood and affect       Assessment and Plan   The following treatment plan was discussed     1. Essential hypertension  Well controlled on losartan and diltiazem.     2. Extranodal lymphoma (HCC)  No longer undergoing surveillance.     3. Malignant melanoma, unspecified site (HCC)  Following with dermatology.     4. Personal history of malignant neoplasm of prostate  Following with urology.     5. Renal mass, left  Following with urology.     6. Hearing problem of both ears  He likely isn't adjusting well to his current hearing aids and advised he follow up with audiology to see if they suggest another fit/brand.     7. Balance problem  Offered physical therapy for strength/balance training but he maintains he has good strength. Advised him to avoid quick turns.     8. Presence of permanent cardiac pacemaker  Following with cardiology.     Return in about 6 months (around 3/21/2019).    Health Maintenance      Health Maintenance Due "   Topic Date Due   • IMM DTaP/Tdap/Td Vaccine (1 - Tdap) 09/25/1940   • IMM ZOSTER VACCINES (1 of 2) 09/25/1971   • Annual Wellness Visit  08/04/2016   • IMM PNEUMOCOCCAL 65+ (ADULT) HIGH/HIGHEST RISK SERIES (2 of 2 - PPSV23) 04/05/2018       Aj Alfaro MD  Internal Medicine  [facility]

## 2018-09-21 NOTE — ASSESSMENT & PLAN NOTE
Has bilateral hearing aids. Says that the left ear sometimes hurts from the noise and he has to remove the hearing aid sometimes. His daughter says that when they are in they hurt him.

## 2018-09-25 ENCOUNTER — APPOINTMENT (RX ONLY)
Dept: URBAN - METROPOLITAN AREA CLINIC 36 | Facility: CLINIC | Age: 83
Setting detail: DERMATOLOGY
End: 2018-09-25

## 2018-09-25 PROBLEM — C44.229 SQUAMOUS CELL CARCINOMA OF SKIN OF LEFT EAR AND EXTERNAL AURICULAR CANAL: Status: ACTIVE | Noted: 2018-09-25

## 2018-09-25 PROCEDURE — 13152 CMPLX RPR E/N/E/L 2.6-7.5 CM: CPT

## 2018-09-25 PROCEDURE — ? EXCISION

## 2018-09-25 PROCEDURE — 11642 EXC F/E/E/N/L MAL+MRG 1.1-2: CPT

## 2018-09-25 NOTE — PROCEDURE: EXCISION
Mucosal Advancement Flap Text: Given the location of the defect, shape of the defect and the proximity to free margins a mucosal advancement flap was deemed most appropriate. Incisions were made with a 15 blade scalpel in the appropriate fashion along the cutaneous vermilion border and the mucosal lip. The remaining actinically damaged mucosal tissue was excised.  The mucosal advancement flap was then elevated to the gingival sulcus with care taken to preserve the neurovascular structures and advanced into the primary defect. Care was taken to ensure that precise realignment of the vermilion border was achieved.
Crescentic Intermediate Repair Preamble Text (Leave Blank If You Do Not Want): Undermining was performed with blunt dissection.
Home Suture Removal Text: Patient was provided a home suture removal kit and will remove their sutures at home.  If they have any questions or difficulties they will call the office.
S Plasty Text: Given the location and shape of the defect, and the orientation of relaxed skin tension lines, an S-plasty was deemed most appropriate for repair.  Using a sterile surgical marker, the appropriate outline of the S-plasty was drawn, incorporating the defect and placing the expected incisions within the relaxed skin tension lines where possible.  The area thus outlined was incised deep to adipose tissue with a #15 scalpel blade.  The skin margins were undermined to an appropriate distance in all directions utilizing iris scissors. The skin flaps were advanced over the defect.  The opposing margins were then approximated with interrupted buried subcutaneous sutures.
Epidermal Autograft Text: The defect edges were debeveled with a #15 scalpel blade.  Given the location of the defect, shape of the defect and the proximity to free margins an epidermal autograft was deemed most appropriate.  Using a sterile surgical marker, the primary defect shape was transferred to the donor site. The epidermal graft was then harvested.  The skin graft was then placed in the primary defect and oriented appropriately.
Dressing: dry sterile dressing
V-Y Plasty Text: The defect edges were debeveled with a #15 scalpel blade.  Given the location of the defect, shape of the defect and the proximity to free margins an V-Y advancement flap was deemed most appropriate.  Using a sterile surgical marker, an appropriate advancement flap was drawn incorporating the defect and placing the expected incisions within the relaxed skin tension lines where possible.    The area thus outlined was incised deep to adipose tissue with a #15 scalpel blade.  The skin margins were undermined to an appropriate distance in all directions utilizing iris scissors.
Bilateral Helical Rim Advancement Flap Text: The defect edges were debeveled with a #15 blade scalpel.  Given the location of the defect and the proximity to free margins (helical rim) a bilateral helical rim advancement flap was deemed most appropriate.  Using a sterile surgical marker, the appropriate advancement flaps were drawn incorporating the defect and placing the expected incisions between the helical rim and antihelix where possible.  The area thus outlined was incised through and through with a #15 scalpel blade.  With a skin hook and iris scissors, the flaps were gently and sharply undermined and freed up.
Secondary Defect Length (In Cm): 0
Island Pedicle Flap Text: The defect edges were debeveled with a #15 scalpel blade.  Given the location of the defect, shape of the defect and the proximity to free margins an island pedicle advancement flap was deemed most appropriate.  Using a sterile surgical marker, an appropriate advancement flap was drawn incorporating the defect, outlining the appropriate donor tissue and placing the expected incisions within the relaxed skin tension lines where possible.    The area thus outlined was incised deep to adipose tissue with a #15 scalpel blade.  The skin margins were undermined to an appropriate distance in all directions around the primary defect and laterally outward around the island pedicle utilizing iris scissors.  There was minimal undermining beneath the pedicle flap.
Complex Repair And Dermal Autograft Text: The defect edges were debeveled with a #15 scalpel blade.  The primary defect was closed partially with a complex linear closure.  Given the location of the defect, shape of the defect and the proximity to free margins an dermal autograft was deemed most appropriate to repair the remaining defect.  The graft was trimmed to fit the size of the remaining defect.  The graft was then placed in the primary defect, oriented appropriately, and sutured into place.
Excision Method: Fusiform
Complex Repair And Bilobe Flap Text: The defect edges were debeveled with a #15 scalpel blade.  The primary defect was closed partially with a complex linear closure.  Given the location of the remaining defect, shape of the defect and the proximity to free margins a bilobe flap was deemed most appropriate for complete closure of the defect.  Using a sterile surgical marker, an appropriate advancement flap was drawn incorporating the defect and placing the expected incisions within the relaxed skin tension lines where possible.    The area thus outlined was incised deep to adipose tissue with a #15 scalpel blade.  The skin margins were undermined to an appropriate distance in all directions utilizing iris scissors.
Crescentic Advancement Flap Text: The defect edges were debeveled with a #15 scalpel blade.  Given the location of the defect and the proximity to free margins a crescentic advancement flap was deemed most appropriate.  Using a sterile surgical marker, the appropriate advancement flap was drawn incorporating the defect and placing the expected incisions within the relaxed skin tension lines where possible.    The area thus outlined was incised deep to adipose tissue with a #15 scalpel blade.  The skin margins were undermined to an appropriate distance in all directions utilizing iris scissors.
Island Pedicle Flap-Requiring Vessel Identification Text: The defect edges were debeveled with a #15 scalpel blade.  Given the location of the defect, shape of the defect and the proximity to free margins an island pedicle advancement flap was deemed most appropriate.  Using a sterile surgical marker, an appropriate advancement flap was drawn, based on the axial vessel mentioned above, incorporating the defect, outlining the appropriate donor tissue and placing the expected incisions within the relaxed skin tension lines where possible.    The area thus outlined was incised deep to adipose tissue with a #15 scalpel blade.  The skin margins were undermined to an appropriate distance in all directions around the primary defect and laterally outward around the island pedicle utilizing iris scissors.  There was minimal undermining beneath the pedicle flap.
Show Primary And Secondary Defect Sizes Variable (Do Not Hide If You Perform Flaps Or Graft Closures): Yes
Bi-Rhombic Flap Text: The defect edges were debeveled with a #15 scalpel blade.  Given the location of the defect and the proximity to free margins a bi-rhombic flap was deemed most appropriate.  Using a sterile surgical marker, an appropriate rhombic flap was drawn incorporating the defect. The area thus outlined was incised deep to adipose tissue with a #15 scalpel blade.  The skin margins were undermined to an appropriate distance in all directions utilizing iris scissors.
Complex Repair And Z Plasty Text: The defect edges were debeveled with a #15 scalpel blade.  The primary defect was closed partially with a complex linear closure.  Given the location of the remaining defect, shape of the defect and the proximity to free margins a Z plasty was deemed most appropriate for complete closure of the defect.  Using a sterile surgical marker, an appropriate advancement flap was drawn incorporating the defect and placing the expected incisions within the relaxed skin tension lines where possible.    The area thus outlined was incised deep to adipose tissue with a #15 scalpel blade.  The skin margins were undermined to an appropriate distance in all directions utilizing iris scissors.
Path Notes (To The Dermatopathologist): Please check margins.
Composite Graft Text: The defect edges were debeveled with a #15 scalpel blade.  Given the location of the defect, shape of the defect, the proximity to free margins and the fact the defect was full thickness a composite graft was deemed most appropriate.  The defect was outline and then transferred to the donor site.  A full thickness graft was then excised from the donor site. The graft was then placed in the primary defect, oriented appropriately and then sutured into place.  The secondary defect was then repaired using a primary closure.
Bill [id number] For Specimen Handling/Conveyance To Laboratory?: no
Star Wedge Flap Text: The defect edges were debeveled with a #15 scalpel blade.  Given the location of the defect, shape of the defect and the proximity to free margins a star wedge flap was deemed most appropriate.  Using a sterile surgical marker, an appropriate rotation flap was drawn incorporating the defect and placing the expected incisions within the relaxed skin tension lines where possible. The area thus outlined was incised deep to adipose tissue with a #15 scalpel blade.  The skin margins were undermined to an appropriate distance in all directions utilizing iris scissors.
A-T Advancement Flap Text: The defect edges were debeveled with a #15 scalpel blade.  Given the location of the defect, shape of the defect and the proximity to free margins an A-T advancement flap was deemed most appropriate.  Using a sterile surgical marker, an appropriate advancement flap was drawn incorporating the defect and placing the expected incisions within the relaxed skin tension lines where possible.    The area thus outlined was incised deep to adipose tissue with a #15 scalpel blade.  The skin margins were undermined to an appropriate distance in all directions utilizing iris scissors.
Post-Care Instructions: I reviewed with the patient in detail post-care instructions. Patient is not to engage in any heavy lifting, exercise, or swimming for the next 14 days. Should the patient develop any fevers, chills, bleeding, severe pain patient will contact the office immediately.
Complex Repair And Double Advancement Flap Text: The defect edges were debeveled with a #15 scalpel blade.  The primary defect was closed partially with a complex linear closure.  Given the location of the remaining defect, shape of the defect and the proximity to free margins a double advancement flap was deemed most appropriate for complete closure of the defect.  Using a sterile surgical marker, an appropriate advancement flap was drawn incorporating the defect and placing the expected incisions within the relaxed skin tension lines where possible.    The area thus outlined was incised deep to adipose tissue with a #15 scalpel blade.  The skin margins were undermined to an appropriate distance in all directions utilizing iris scissors.
Estimated Blood Loss (Cc): minimal
Hemostasis: Electrocautery
Purse String (Simple) Text: Given the location of the defect and the characteristics of the surrounding skin a purse string simple closure was deemed most appropriate.  Undermining was performed circumferentially around the surgical defect.  A purse string suture was then placed and tightened.
Dermal Autograft Text: The defect edges were debeveled with a #15 scalpel blade.  Given the location of the defect, shape of the defect and the proximity to free margins a dermal autograft was deemed most appropriate.  Using a sterile surgical marker, the primary defect shape was transferred to the donor site. The area thus outlined was incised deep to adipose tissue with a #15 scalpel blade.  The harvested graft was then trimmed of adipose and epidermal tissue until only dermis was left.  The skin graft was then placed in the primary defect and oriented appropriately.
Island Pedicle Flap With Canthal Suspension Text: The defect edges were debeveled with a #15 scalpel blade.  Given the location of the defect, shape of the defect and the proximity to free margins an island pedicle advancement flap was deemed most appropriate.  Using a sterile surgical marker, an appropriate advancement flap was drawn incorporating the defect, outlining the appropriate donor tissue and placing the expected incisions within the relaxed skin tension lines where possible. The area thus outlined was incised deep to adipose tissue with a #15 scalpel blade.  The skin margins were undermined to an appropriate distance in all directions around the primary defect and laterally outward around the island pedicle utilizing iris scissors.  There was minimal undermining beneath the pedicle flap. A suspension suture was placed in the canthal tendon to prevent tension and prevent ectropion.
Complex Repair And Skin Substitute Graft Text: The defect edges were debeveled with a #15 scalpel blade.  The primary defect was closed partially with a complex linear closure.  Given the location of the remaining defect, shape of the defect and the proximity to free margins a skin substitute graft was deemed most appropriate to repair the remaining defect.  The graft was trimmed to fit the size of the remaining defect.  The graft was then placed in the primary defect, oriented appropriately, and sutured into place.
Anesthesia Type: 0.5% lidocaine with 1:200,000 epinephrine and a 1:10 solution of 8.4% sodium bicarbonate and 408mcg clindamycin/ml
Epidermal Closure Graft Donor Site (Optional): simple interrupted
Dorsal Nasal Flap Text: The defect edges were debeveled with a #15 scalpel blade.  Given the location of the defect and the proximity to free margins a dorsal nasal flap was deemed most appropriate.  Using a sterile surgical marker, an appropriate dorsal nasal flap was drawn around the defect.    The area thus outlined was incised deep to adipose tissue with a #15 scalpel blade.  The skin margins were undermined to an appropriate distance in all directions utilizing iris scissors.
Lab: [id number]
Keystone Flap Text: The defect edges were debeveled with a #15 scalpel blade.  Given the location of the defect, shape of the defect a keystone flap was deemed most appropriate.  Using a sterile surgical marker, an appropriate keystone flap was drawn incorporating the defect, outlining the appropriate donor tissue and placing the expected incisions within the relaxed skin tension lines where possible. The area thus outlined was incised deep to adipose tissue with a #15 scalpel blade.  The skin margins were undermined to an appropriate distance in all directions around the primary defect and laterally outward around the flap utilizing iris scissors.
Complex Repair And O-L Flap Text: The defect edges were debeveled with a #15 scalpel blade.  The primary defect was closed partially with a complex linear closure.  Given the location of the remaining defect, shape of the defect and the proximity to free margins an O-L flap was deemed most appropriate for complete closure of the defect.  Using a sterile surgical marker, an appropriate flap was drawn incorporating the defect and placing the expected incisions within the relaxed skin tension lines where possible.    The area thus outlined was incised deep to adipose tissue with a #15 scalpel blade.  The skin margins were undermined to an appropriate distance in all directions utilizing iris scissors.
Posterior Auricular Interpolation Flap Text: A decision was made to reconstruct the defect utilizing an interpolation axial flap and a staged reconstruction.  A telfa template was made of the defect.  This telfa template was then used to outline the posterior auricular interpolation flap.  The donor area for the pedicle flap was then injected with anesthesia.  The flap was excised through the skin and subcutaneous tissue down to the layer of the underlying musculature.  The pedicle flap was carefully excised within this deep plane to maintain its blood supply.  The edges of the donor site were undermined.   The donor site was closed in a primary fashion.  The pedicle was then rotated into position and sutured.  Once the tube was sutured into place, adequate blood supply was confirmed with blanching and refill.  The pedicle was then wrapped with xeroform gauze and dressed appropriately with a telfa and gauze bandage to ensure continued blood supply and protect the attached pedicle.
Complex Repair And Dorsal Nasal Flap Text: The defect edges were debeveled with a #15 scalpel blade.  The primary defect was closed partially with a complex linear closure.  Given the location of the remaining defect, shape of the defect and the proximity to free margins a dorsal nasal flap was deemed most appropriate for complete closure of the defect.  Using a sterile surgical marker, an appropriate flap was drawn incorporating the defect and placing the expected incisions within the relaxed skin tension lines where possible.    The area thus outlined was incised deep to adipose tissue with a #15 scalpel blade.  The skin margins were undermined to an appropriate distance in all directions utilizing iris scissors.
Size Of Margin In Cm: 0.3
W Plasty Text: The lesion was extirpated to the level of the fat with a #15 scalpel blade.  Given the location of the defect, shape of the defect and the proximity to free margins a W-plasty was deemed most appropriate for repair.  Using a sterile surgical marker, the appropriate transposition arms of the W-plasty were drawn incorporating the defect and placing the expected incisions within the relaxed skin tension lines where possible.    The area thus outlined was incised deep to adipose tissue with a #15 scalpel blade.  The skin margins were undermined to an appropriate distance in all directions utilizing iris scissors.  The opposing transposition arms were then transposed into place in opposite direction and anchored with interrupted buried subcutaneous sutures.
Complex Repair And Melolabial Flap Text: The defect edges were debeveled with a #15 scalpel blade.  The primary defect was closed partially with a complex linear closure.  Given the location of the remaining defect, shape of the defect and the proximity to free margins a melolabial flap was deemed most appropriate for complete closure of the defect.  Using a sterile surgical marker, an appropriate advancement flap was drawn incorporating the defect and placing the expected incisions within the relaxed skin tension lines where possible.    The area thus outlined was incised deep to adipose tissue with a #15 scalpel blade.  The skin margins were undermined to an appropriate distance in all directions utilizing iris scissors.
Perilesional Excision Additional Text (Leave Blank If You Do Not Want): The margin was drawn around the clinically apparent lesion. Incisions were then made along these lines to the appropriate tissue plane and the lesion was extirpated.
O-T Advancement Flap Text: The defect edges were debeveled with a #15 scalpel blade.  Given the location of the defect, shape of the defect and the proximity to free margins an O-T advancement flap was deemed most appropriate.  Using a sterile surgical marker, an appropriate advancement flap was drawn incorporating the defect and placing the expected incisions within the relaxed skin tension lines where possible.    The area thus outlined was incised deep to adipose tissue with a #15 scalpel blade.  The skin margins were undermined to an appropriate distance in all directions utilizing iris scissors.
Spiral Flap Text: The defect edges were debeveled with a #15 scalpel blade.  Given the location of the defect, shape of the defect and the proximity to free margins a spiral flap was deemed most appropriate.  Using a sterile surgical marker, an appropriate rotation flap was drawn incorporating the defect and placing the expected incisions within the relaxed skin tension lines where possible. The area thus outlined was incised deep to adipose tissue with a #15 scalpel blade.  The skin margins were undermined to an appropriate distance in all directions utilizing iris scissors.
Epidermal Closure: running cuticular
H Plasty Text: Given the location of the defect, shape of the defect and the proximity to free margins a H-plasty was deemed most appropriate for repair.  Using a sterile surgical marker, the appropriate advancement arms of the H-plasty were drawn incorporating the defect and placing the expected incisions within the relaxed skin tension lines where possible. The area thus outlined was incised deep to adipose tissue with a #15 scalpel blade. The skin margins were undermined to an appropriate distance in all directions utilizing iris scissors.  The opposing advancement arms were then advanced into place in opposite direction and anchored with interrupted buried subcutaneous sutures.
Complex Repair And Transposition Flap Text: The defect edges were debeveled with a #15 scalpel blade.  The primary defect was closed partially with a complex linear closure.  Given the location of the remaining defect, shape of the defect and the proximity to free margins a transposition flap was deemed most appropriate for complete closure of the defect.  Using a sterile surgical marker, an appropriate advancement flap was drawn incorporating the defect and placing the expected incisions within the relaxed skin tension lines where possible.    The area thus outlined was incised deep to adipose tissue with a #15 scalpel blade.  The skin margins were undermined to an appropriate distance in all directions utilizing iris scissors.
Melolabial Interpolation Flap Text: A decision was made to reconstruct the defect utilizing an interpolation axial flap and a staged reconstruction.  A telfa template was made of the defect.  This telfa template was then used to outline the melolabial interpolation flap.  The donor area for the pedicle flap was then injected with anesthesia.  The flap was excised through the skin and subcutaneous tissue down to the layer of the underlying musculature.  The pedicle flap was carefully excised within this deep plane to maintain its blood supply.  The edges of the donor site were undermined.   The donor site was closed in a primary fashion.  The pedicle was then rotated into position and sutured.  Once the tube was sutured into place, adequate blood supply was confirmed with blanching and refill.  The pedicle was then wrapped with xeroform gauze and dressed appropriately with a telfa and gauze bandage to ensure continued blood supply and protect the attached pedicle.
Cheek Interpolation Flap Text: A decision was made to reconstruct the defect utilizing an interpolation axial flap and a staged reconstruction.  A telfa template was made of the defect.  This telfa template was then used to outline the Cheek Interpolation flap.  The donor area for the pedicle flap was then injected with anesthesia.  The flap was excised through the skin and subcutaneous tissue down to the layer of the underlying musculature.  The interpolation flap was carefully excised within this deep plane to maintain its blood supply.  The edges of the donor site were undermined.   The donor site was closed in a primary fashion.  The pedicle was then rotated into position and sutured.  Once the tube was sutured into place, adequate blood supply was confirmed with blanching and refill.  The pedicle was then wrapped with xeroform gauze and dressed appropriately with a telfa and gauze bandage to ensure continued blood supply and protect the attached pedicle.
Lab Facility: 
Rhombic Flap Text: The defect edges were debeveled with a #15 scalpel blade.  Given the location of the defect and the proximity to free margins a rhombic flap was deemed most appropriate.  Using a sterile surgical marker, an appropriate rhombic flap was drawn incorporating the defect.    The area thus outlined was incised deep to adipose tissue with a #15 scalpel blade.  The skin margins were undermined to an appropriate distance in all directions utilizing iris scissors.
Scalpel Size: 15 blade
Detail Level: Detailed
Complex Repair And Ftsg Text: The defect edges were debeveled with a #15 scalpel blade.  The primary defect was closed partially with a complex linear closure.  Given the location of the defect, shape of the defect and the proximity to free margins a full thickness skin graft was deemed most appropriate to repair the remaining defect.  The graft was trimmed to fit the size of the remaining defect.  The graft was then placed in the primary defect, oriented appropriately, and sutured into place.
Trilobed Flap Text: The defect edges were debeveled with a #15 scalpel blade.  Given the location of the defect and the proximity to free margins a trilobed flap was deemed most appropriate.  Using a sterile surgical marker, an appropriate trilobed flap drawn around the defect.    The area thus outlined was incised deep to adipose tissue with a #15 scalpel blade.  The skin margins were undermined to an appropriate distance in all directions utilizing iris scissors.
Additional Anesthesia Volume In Cc: 6
Lazy S Complex Repair Preamble Text (Leave Blank If You Do Not Want): Extensive wide undermining was performed.
Body Location Override (Optional - Billing Will Still Be Based On Selected Body Map Location If Applicable): left posterior ear
Advancement Flap (Double) Text: The defect edges were debeveled with a #15 scalpel blade.  Given the location of the defect and the proximity to free margins a double advancement flap was deemed most appropriate.  Using a sterile surgical marker, the appropriate advancement flaps were drawn incorporating the defect and placing the expected incisions within the relaxed skin tension lines where possible.    The area thus outlined was incised deep to adipose tissue with a #15 scalpel blade.  The skin margins were undermined to an appropriate distance in all directions utilizing iris scissors.
Z Plasty Text: The lesion was extirpated to the level of the fat with a #15 scalpel blade.  Given the location of the defect, shape of the defect and the proximity to free margins a Z-plasty was deemed most appropriate for repair.  Using a sterile surgical marker, the appropriate transposition arms of the Z-plasty were drawn incorporating the defect and placing the expected incisions within the relaxed skin tension lines where possible.    The area thus outlined was incised deep to adipose tissue with a #15 scalpel blade.  The skin margins were undermined to an appropriate distance in all directions utilizing iris scissors.  The opposing transposition arms were then transposed into place in opposite direction and anchored with interrupted buried subcutaneous sutures.
Billing Type: Third-Party Bill
Mastoid Interpolation Flap Text: A decision was made to reconstruct the defect utilizing an interpolation axial flap and a staged reconstruction.  A telfa template was made of the defect.  This telfa template was then used to outline the mastoid interpolation flap.  The donor area for the pedicle flap was then injected with anesthesia.  The flap was excised through the skin and subcutaneous tissue down to the layer of the underlying musculature.  The pedicle flap was carefully excised within this deep plane to maintain its blood supply.  The edges of the donor site were undermined.   The donor site was closed in a primary fashion.  The pedicle was then rotated into position and sutured.  Once the tube was sutured into place, adequate blood supply was confirmed with blanching and refill.  The pedicle was then wrapped with xeroform gauze and dressed appropriately with a telfa and gauze bandage to ensure continued blood supply and protect the attached pedicle.
Anesthesia Type: 1% lidocaine with epinephrine
Ear Star Wedge Flap Text: The defect edges were debeveled with a #15 blade scalpel.  Given the location of the defect and the proximity to free margins (helical rim) an ear star wedge flap was deemed most appropriate.  Using a sterile surgical marker, the appropriate flap was drawn incorporating the defect and placing the expected incisions between the helical rim and antihelix where possible.  The area thus outlined was incised through and through with a #15 scalpel blade.
Partial Purse String (Intermediate) Text: Given the location of the defect and the characteristics of the surrounding skin an intermediate purse string closure was deemed most appropriate.  Undermining was performed circumferentially around the surgical defect.  A purse string suture was then placed and tightened. Wound tension of the circular defect prevented complete closure of the wound.
Positioning (Leave Blank If You Do Not Want): The patient was placed in a comfortable position exposing the surgical site.
Excisional Biopsy Additional Text (Leave Blank If You Do Not Want): The margin was drawn around the clinically apparent lesion. An elliptical shape was then drawn on the skin incorporating the lesion and margins.  Incisions were then made along these lines to the appropriate tissue plane and the lesion was extirpated.
Partial Purse String (Simple) Text: Given the location of the defect and the characteristics of the surrounding skin a simple purse string closure was deemed most appropriate.  Undermining was performed circumferentially around the surgical defect.  A purse string suture was then placed and tightened. Wound tension of the circular defect prevented complete closure of the wound.
Double Island Pedicle Flap Text: The defect edges were debeveled with a #15 scalpel blade.  Given the location of the defect, shape of the defect and the proximity to free margins a double island pedicle advancement flap was deemed most appropriate.  Using a sterile surgical marker, an appropriate advancement flap was drawn incorporating the defect, outlining the appropriate donor tissue and placing the expected incisions within the relaxed skin tension lines where possible.    The area thus outlined was incised deep to adipose tissue with a #15 scalpel blade.  The skin margins were undermined to an appropriate distance in all directions around the primary defect and laterally outward around the island pedicle utilizing iris scissors.  There was minimal undermining beneath the pedicle flap.
Previous Accession (Optional): [id number]
Slit Excision Additional Text (Leave Blank If You Do Not Want): A linear line was drawn on the skin overlying the lesion. An incision was made slowly until the lesion was visualized.  Once visualized, the lesion was removed with blunt dissection.
Alar Island Pedicle Flap Text: The defect edges were debeveled with a #15 scalpel blade.  Given the location of the defect, shape of the defect and the proximity to the alar rim an island pedicle advancement flap was deemed most appropriate.  Using a sterile surgical marker, an appropriate advancement flap was drawn incorporating the defect, outlining the appropriate donor tissue and placing the expected incisions within the nasal ala running parallel to the alar rim. The area thus outlined was incised with a #15 scalpel blade.  The skin margins were undermined minimally to an appropriate distance in all directions around the primary defect and laterally outward around the island pedicle utilizing iris scissors.  There was minimal undermining beneath the pedicle flap.
Tissue Cultured Epidermal Autograft Text: The defect edges were debeveled with a #15 scalpel blade.  Given the location of the defect, shape of the defect and the proximity to free margins a tissue cultured epidermal autograft was deemed most appropriate.  The graft was then trimmed to fit the size of the defect.  The graft was then placed in the primary defect and oriented appropriately.
Consent was obtained from the patient. The risks and benefits to therapy were discussed in detail. Specifically, the risks of infection, scarring, bleeding, prolonged wound healing, incomplete removal, allergy to anesthesia, nerve injury and recurrence were addressed. Prior to the procedure, the treatment site was clearly identified and confirmed by the patient. All components of Universal Protocol/PAUSE Rule completed.
Bilobed Flap Text: The defect edges were debeveled with a #15 scalpel blade.  Given the location of the defect and the proximity to free margins a bilobe flap was deemed most appropriate.  Using a sterile surgical marker, an appropriate bilobe flap drawn around the defect.    The area thus outlined was incised deep to adipose tissue with a #15 scalpel blade.  The skin margins were undermined to an appropriate distance in all directions utilizing iris scissors.
Complex Repair And Xenograft Text: The defect edges were debeveled with a #15 scalpel blade.  The primary defect was closed partially with a complex linear closure.  Given the location of the defect, shape of the defect and the proximity to free margins a xenograft was deemed most appropriate to repair the remaining defect.  The graft was trimmed to fit the size of the remaining defect.  The graft was then placed in the primary defect, oriented appropriately, and sutured into place.
Complex Repair And Rhombic Flap Text: The defect edges were debeveled with a #15 scalpel blade.  The primary defect was closed partially with a complex linear closure.  Given the location of the remaining defect, shape of the defect and the proximity to free margins a rhombic flap was deemed most appropriate for complete closure of the defect.  Using a sterile surgical marker, an appropriate advancement flap was drawn incorporating the defect and placing the expected incisions within the relaxed skin tension lines where possible.    The area thus outlined was incised deep to adipose tissue with a #15 scalpel blade.  The skin margins were undermined to an appropriate distance in all directions utilizing iris scissors.
Anesthesia Volume In Cc: 7
Ftsg Text: The defect edges were debeveled with a #15 scalpel blade.  Given the location of the defect, shape of the defect and the proximity to free margins a full thickness skin graft was deemed most appropriate.  Using a sterile surgical marker, the primary defect shape was transferred to the donor site. The area thus outlined was incised deep to adipose tissue with a #15 scalpel blade.  The harvested graft was then trimmed of adipose tissue until only dermis and epidermis was left.  The skin margins of the secondary defect were undermined to an appropriate distance in all directions utilizing iris scissors.  The secondary defect was closed with interrupted buried subcutaneous sutures.  The skin edges were then re-apposed with running  sutures.  The skin graft was then placed in the primary defect and oriented appropriately.
Complex Repair And A-T Advancement Flap Text: The defect edges were debeveled with a #15 scalpel blade.  The primary defect was closed partially with a complex linear closure.  Given the location of the remaining defect, shape of the defect and the proximity to free margins an A-T advancement flap was deemed most appropriate for complete closure of the defect.  Using a sterile surgical marker, an appropriate advancement flap was drawn incorporating the defect and placing the expected incisions within the relaxed skin tension lines where possible.    The area thus outlined was incised deep to adipose tissue with a #15 scalpel blade.  The skin margins were undermined to an appropriate distance in all directions utilizing iris scissors.
Complex Repair And Modified Advancement Flap Text: The defect edges were debeveled with a #15 scalpel blade.  The primary defect was closed partially with a complex linear closure.  Given the location of the remaining defect, shape of the defect and the proximity to free margins a modified advancement flap was deemed most appropriate for complete closure of the defect.  Using a sterile surgical marker, an appropriate advancement flap was drawn incorporating the defect and placing the expected incisions within the relaxed skin tension lines where possible.    The area thus outlined was incised deep to adipose tissue with a #15 scalpel blade.  The skin margins were undermined to an appropriate distance in all directions utilizing iris scissors.
Complex Repair And Split-Thickness Skin Graft Text: The defect edges were debeveled with a #15 scalpel blade.  The primary defect was closed partially with a complex linear closure.  Given the location of the defect, shape of the defect and the proximity to free margins a split thickness skin graft was deemed most appropriate to repair the remaining defect.  The graft was trimmed to fit the size of the remaining defect.  The graft was then placed in the primary defect, oriented appropriately, and sutured into place.
Primary Defect Length (In Cm): 3
Excision Depth: cartilage
Rotation Flap Text: The defect edges were debeveled with a #15 scalpel blade.  Given the location of the defect, shape of the defect and the proximity to free margins a rotation flap was deemed most appropriate.  Using a sterile surgical marker, an appropriate rotation flap was drawn incorporating the defect and placing the expected incisions within the relaxed skin tension lines where possible.    The area thus outlined was incised deep to adipose tissue with a #15 scalpel blade.  The skin margins were undermined to an appropriate distance in all directions utilizing iris scissors.
Repair Type: Complex
V-Y Flap Text: The defect edges were debeveled with a #15 scalpel blade.  Given the location of the defect, shape of the defect and the proximity to free margins a V-Y flap was deemed most appropriate.  Using a sterile surgical marker, an appropriate advancement flap was drawn incorporating the defect and placing the expected incisions within the relaxed skin tension lines where possible.    The area thus outlined was incised deep to adipose tissue with a #15 scalpel blade.  The skin margins were undermined to an appropriate distance in all directions utilizing iris scissors.
Bilobed Transposition Flap Text: The defect edges were debeveled with a #15 scalpel blade.  Given the location of the defect and the proximity to free margins a bilobed transposition flap was deemed most appropriate.  Using a sterile surgical marker, an appropriate bilobe flap drawn around the defect.    The area thus outlined was incised deep to adipose tissue with a #15 scalpel blade.  The skin margins were undermined to an appropriate distance in all directions utilizing iris scissors.
Banner Transposition Flap Text: The defect edges were debeveled with a #15 scalpel blade.  Given the location of the defect and the proximity to free margins a Banner transposition flap was deemed most appropriate.  Using a sterile surgical marker, an appropriate flap drawn around the defect. The area thus outlined was incised deep to adipose tissue with a #15 scalpel blade.  The skin margins were undermined to an appropriate distance in all directions utilizing iris scissors.
Complex Repair And Single Advancement Flap Text: The defect edges were debeveled with a #15 scalpel blade.  The primary defect was closed partially with a complex linear closure.  Given the location of the remaining defect, shape of the defect and the proximity to free margins a single advancement flap was deemed most appropriate for complete closure of the defect.  Using a sterile surgical marker, an appropriate advancement flap was drawn incorporating the defect and placing the expected incisions within the relaxed skin tension lines where possible.    The area thus outlined was incised deep to adipose tissue with a #15 scalpel blade.  The skin margins were undermined to an appropriate distance in all directions utilizing iris scissors.
Complex Repair And W Plasty Text: The defect edges were debeveled with a #15 scalpel blade.  The primary defect was closed partially with a complex linear closure.  Given the location of the remaining defect, shape of the defect and the proximity to free margins a W plasty was deemed most appropriate for complete closure of the defect.  Using a sterile surgical marker, an appropriate advancement flap was drawn incorporating the defect and placing the expected incisions within the relaxed skin tension lines where possible.    The area thus outlined was incised deep to adipose tissue with a #15 scalpel blade.  The skin margins were undermined to an appropriate distance in all directions utilizing iris scissors.
Size Of Lesion In Cm: 0.9
O-T Plasty Text: The defect edges were debeveled with a #15 scalpel blade.  Given the location of the defect, shape of the defect and the proximity to free margins an O-T plasty was deemed most appropriate.  Using a sterile surgical marker, an appropriate O-T plasty was drawn incorporating the defect and placing the expected incisions within the relaxed skin tension lines where possible.    The area thus outlined was incised deep to adipose tissue with a #15 scalpel blade.  The skin margins were undermined to an appropriate distance in all directions utilizing iris scissors.
Mercedes Flap Text: The defect edges were debeveled with a #15 scalpel blade.  Given the location of the defect, shape of the defect and the proximity to free margins a Mercedes flap was deemed most appropriate.  Using a sterile surgical marker, an appropriate advancement flap was drawn incorporating the defect and placing the expected incisions within the relaxed skin tension lines where possible. The area thus outlined was incised deep to adipose tissue with a #15 scalpel blade.  The skin margins were undermined to an appropriate distance in all directions utilizing iris scissors.
Graft Donor Site Bandage (Optional-Leave Blank If You Don't Want In Note): Steri-strips and a pressure bandage were applied to the donor site.
Lip Wedge Excision Repair Text: Given the location of the defect and the proximity to free margins a full thickness wedge repair was deemed most appropriate.  Using a sterile surgical marker, the appropriate repair was drawn incorporating the defect and placing the expected incisions perpendicular to the vermilion border.  The vermilion border was also meticulously outlined to ensure appropriate reapproximation during the repair.  The area thus outlined was incised through and through with a #15 scalpel blade.  The muscularis and dermis were reaproximated with deep sutures following hemostasis. Care was taken to realign the vermilion border before proceeding with the superficial closure.  Once the vermilion was realigned the superfical and mucosal closure was finished.
Muscle Hinge Flap Text: The defect edges were debeveled with a #15 scalpel blade.  Given the size, depth and location of the defect and the proximity to free margins a muscle hinge flap was deemed most appropriate.  Using a sterile surgical marker, an appropriate hinge flap was drawn incorporating the defect. The area thus outlined was incised with a #15 scalpel blade.  The skin margins were undermined to an appropriate distance in all directions utilizing iris scissors.
Fusiform Excision Additional Text (Leave Blank If You Do Not Want): The margin was drawn around the clinically apparent lesion.  A fusiform shape was then drawn on the skin incorporating the lesion and margins.  Incisions were then made along these lines to the appropriate tissue plane and the lesion was extirpated.
Curvilinear Excision Additional Text (Leave Blank If You Do Not Want): The margin was drawn around the clinically apparent lesion.  A curvilinear shape was then drawn on the skin incorporating the lesion and margins.  Incisions were then made along these lines to the appropriate tissue plane and the lesion was extirpated.
Cheek-To-Nose Interpolation Flap Text: A decision was made to reconstruct the defect utilizing an interpolation axial flap and a staged reconstruction.  A telfa template was made of the defect.  This telfa template was then used to outline the Cheek-To-Nose Interpolation flap.  The donor area for the pedicle flap was then injected with anesthesia.  The flap was excised through the skin and subcutaneous tissue down to the layer of the underlying musculature.  The interpolation flap was carefully excised within this deep plane to maintain its blood supply.  The edges of the donor site were undermined.   The donor site was closed in a primary fashion.  The pedicle was then rotated into position and sutured.  Once the tube was sutured into place, adequate blood supply was confirmed with blanching and refill.  The pedicle was then wrapped with xeroform gauze and dressed appropriately with a telfa and gauze bandage to ensure continued blood supply and protect the attached pedicle.
Hatchet Flap Text: The defect edges were debeveled with a #15 scalpel blade.  Given the location of the defect, shape of the defect and the proximity to free margins a hatchet flap was deemed most appropriate.  Using a sterile surgical marker, an appropriate hatchet flap was drawn incorporating the defect and placing the expected incisions within the relaxed skin tension lines where possible.    The area thus outlined was incised deep to adipose tissue with a #15 scalpel blade.  The skin margins were undermined to an appropriate distance in all directions utilizing iris scissors.
Pre-Excision Curettage Text (Leave Blank If You Do Not Want): Prior to drawing the surgical margin the visible lesion was removed with electrodesiccation and curettage to clearly define the lesion size.
Split-Thickness Skin Graft Text: The defect edges were debeveled with a #15 scalpel blade.  Given the location of the defect, shape of the defect and the proximity to free margins a split thickness skin graft was deemed most appropriate.  Using a sterile surgical marker, the primary defect shape was transferred to the donor site. The split thickness graft was then harvested.  The skin graft was then placed in the primary defect and oriented appropriately.
No Repair - Repaired With Adjacent Surgical Defect Text (Leave Blank If You Do Not Want): After the excision the defect was repaired concurrently with another surgical defect which was in close approximation.
Advancement-Rotation Flap Text: The defect edges were debeveled with a #15 scalpel blade.  Given the location of the defect, shape of the defect and the proximity to free margins an advancement-rotation flap was deemed most appropriate.  Using a sterile surgical marker, an appropriate flap was drawn incorporating the defect and placing the expected incisions within the relaxed skin tension lines where possible. The area thus outlined was incised deep to adipose tissue with a #15 scalpel blade.  The skin margins were undermined to an appropriate distance in all directions utilizing iris scissors.
O-L Flap Text: The defect edges were debeveled with a #15 scalpel blade.  Given the location of the defect, shape of the defect and the proximity to free margins an O-L flap was deemed most appropriate.  Using a sterile surgical marker, an appropriate advancement flap was drawn incorporating the defect and placing the expected incisions within the relaxed skin tension lines where possible.    The area thus outlined was incised deep to adipose tissue with a #15 scalpel blade.  The skin margins were undermined to an appropriate distance in all directions utilizing iris scissors.
Advancement Flap (Single) Text: The defect edges were debeveled with a #15 scalpel blade.  Given the location of the defect and the proximity to free margins a single advancement flap was deemed most appropriate.  Using a sterile surgical marker, an appropriate advancement flap was drawn incorporating the defect and placing the expected incisions within the relaxed skin tension lines where possible.    The area thus outlined was incised deep to adipose tissue with a #15 scalpel blade.  The skin margins were undermined to an appropriate distance in all directions utilizing iris scissors.
Complex Repair And O-T Advancement Flap Text: The defect edges were debeveled with a #15 scalpel blade.  The primary defect was closed partially with a complex linear closure.  Given the location of the remaining defect, shape of the defect and the proximity to free margins an O-T advancement flap was deemed most appropriate for complete closure of the defect.  Using a sterile surgical marker, an appropriate advancement flap was drawn incorporating the defect and placing the expected incisions within the relaxed skin tension lines where possible.    The area thus outlined was incised deep to adipose tissue with a #15 scalpel blade.  The skin margins were undermined to an appropriate distance in all directions utilizing iris scissors.
Complex Repair And Tissue Cultured Epidermal Autograft Text: The defect edges were debeveled with a #15 scalpel blade.  The primary defect was closed partially with a complex linear closure.  Given the location of the defect, shape of the defect and the proximity to free margins an tissue cultured epidermal autograft was deemed most appropriate to repair the remaining defect.  The graft was trimmed to fit the size of the remaining defect.  The graft was then placed in the primary defect, oriented appropriately, and sutured into place.
Skin Substitute Text: The defect edges were debeveled with a #15 scalpel blade.  Given the location of the defect, shape of the defect and the proximity to free margins a skin substitute graft was deemed most appropriate.  The graft material was trimmed to fit the size of the defect. The graft was then placed in the primary defect and oriented appropriately.
Epidermal Sutures: 4-0 Chromic Gut
X Size Of Lesion In Cm (Optional): 0.6
Complex Repair And Double M Plasty Text: The defect edges were debeveled with a #15 scalpel blade.  The primary defect was closed partially with a complex linear closure.  Given the location of the remaining defect, shape of the defect and the proximity to free margins a double M plasty was deemed most appropriate for complete closure of the defect.  Using a sterile surgical marker, an appropriate advancement flap was drawn incorporating the defect and placing the expected incisions within the relaxed skin tension lines where possible.    The area thus outlined was incised deep to adipose tissue with a #15 scalpel blade.  The skin margins were undermined to an appropriate distance in all directions utilizing iris scissors.
Interpolation Flap Text: A decision was made to reconstruct the defect utilizing an interpolation axial flap and a staged reconstruction.  A telfa template was made of the defect.  This telfa template was then used to outline the interpolation flap.  The donor area for the pedicle flap was then injected with anesthesia.  The flap was excised through the skin and subcutaneous tissue down to the layer of the underlying musculature.  The interpolation flap was carefully excised within this deep plane to maintain its blood supply.  The edges of the donor site were undermined.   The donor site was closed in a primary fashion.  The pedicle was then rotated into position and sutured.  Once the tube was sutured into place, adequate blood supply was confirmed with blanching and refill.  The pedicle was then wrapped with xeroform gauze and dressed appropriately with a telfa and gauze bandage to ensure continued blood supply and protect the attached pedicle.
O-Z Plasty Text: The defect edges were debeveled with a #15 scalpel blade.  Given the location of the defect, shape of the defect and the proximity to free margins an O-Z plasty (double transposition flap) was deemed most appropriate.  Using a sterile surgical marker, the appropriate transposition flaps were drawn incorporating the defect and placing the expected incisions within the relaxed skin tension lines where possible.    The area thus outlined was incised deep to adipose tissue with a #15 scalpel blade.  The skin margins were undermined to an appropriate distance in all directions utilizing iris scissors.  Hemostasis was achieved with electrocautery.  The flaps were then transposed into place, one clockwise and the other counterclockwise, and anchored with interrupted buried subcutaneous sutures.
Anesthesia Volume In Cc: 10
Helical Rim Advancement Flap Text: The defect edges were debeveled with a #15 blade scalpel.  Given the location of the defect and the proximity to free margins (helical rim) a double helical rim advancement flap was deemed most appropriate.  Using a sterile surgical marker, the appropriate advancement flaps were drawn incorporating the defect and placing the expected incisions between the helical rim and antihelix where possible.  The area thus outlined was incised through and through with a #15 scalpel blade.  With a skin hook and iris scissors, the flaps were gently and sharply undermined and freed up.
Complex Repair And Epidermal Autograft Text: The defect edges were debeveled with a #15 scalpel blade.  The primary defect was closed partially with a complex linear closure.  Given the location of the defect, shape of the defect and the proximity to free margins an epidermal autograft was deemed most appropriate to repair the remaining defect.  The graft was trimmed to fit the size of the remaining defect.  The graft was then placed in the primary defect, oriented appropriately, and sutured into place.
Modified Advancement Flap Text: The defect edges were debeveled with a #15 scalpel blade.  Given the location of the defect, shape of the defect and the proximity to free margins a modified advancement flap was deemed most appropriate.  Using a sterile surgical marker, an appropriate advancement flap was drawn incorporating the defect and placing the expected incisions within the relaxed skin tension lines where possible.    The area thus outlined was incised deep to adipose tissue with a #15 scalpel blade.  The skin margins were undermined to an appropriate distance in all directions utilizing iris scissors.
Xenograft Text: The defect edges were debeveled with a #15 scalpel blade.  Given the location of the defect, shape of the defect and the proximity to free margins a xenograft was deemed most appropriate.  The graft was then trimmed to fit the size of the defect.  The graft was then placed in the primary defect and oriented appropriately.
Cartilage Graft Text: The defect edges were debeveled with a #15 scalpel blade.  Given the location of the defect, shape of the defect, the fact the defect involved a full thickness cartilage defect a cartilage graft was deemed most appropriate.  An appropriate donor site was identified, cleansed, and anesthetized. The cartilage graft was then harvested and transferred to the recipient site, oriented appropriately and then sutured into place.  The secondary defect was then repaired using a primary closure.
Deep Sutures: 4-0 PDO
Complex Repair And M Plasty Text: The defect edges were debeveled with a #15 scalpel blade.  The primary defect was closed partially with a complex linear closure.  Given the location of the remaining defect, shape of the defect and the proximity to free margins an M plasty was deemed most appropriate for complete closure of the defect.  Using a sterile surgical marker, an appropriate advancement flap was drawn incorporating the defect and placing the expected incisions within the relaxed skin tension lines where possible.    The area thus outlined was incised deep to adipose tissue with a #15 scalpel blade.  The skin margins were undermined to an appropriate distance in all directions utilizing iris scissors.
Primary Defect Width (In Cm): 1.2
Melolabial Transposition Flap Text: The defect edges were debeveled with a #15 scalpel blade.  Given the location of the defect and the proximity to free margins a melolabial flap was deemed most appropriate.  Using a sterile surgical marker, an appropriate melolabial transposition flap was drawn incorporating the defect.    The area thus outlined was incised deep to adipose tissue with a #15 scalpel blade.  The skin margins were undermined to an appropriate distance in all directions utilizing iris scissors.
Complex Repair And Rotation Flap Text: The defect edges were debeveled with a #15 scalpel blade.  The primary defect was closed partially with a complex linear closure.  Given the location of the remaining defect, shape of the defect and the proximity to free margins a rotation flap was deemed most appropriate for complete closure of the defect.  Using a sterile surgical marker, an appropriate advancement flap was drawn incorporating the defect and placing the expected incisions within the relaxed skin tension lines where possible.    The area thus outlined was incised deep to adipose tissue with a #15 scalpel blade.  The skin margins were undermined to an appropriate distance in all directions utilizing iris scissors.
Paramedian Forehead Flap Text: A decision was made to reconstruct the defect utilizing an interpolation axial flap and a staged reconstruction.  A telfa template was made of the defect.  This telfa template was then used to outline the paramedian forehead pedicle flap.  The donor area for the pedicle flap was then injected with anesthesia.  The flap was excised through the skin and subcutaneous tissue down to the layer of the underlying musculature.  The pedicle flap was carefully excised within this deep plane to maintain its blood supply.  The edges of the donor site were undermined.   The donor site was closed in a primary fashion.  The pedicle was then rotated into position and sutured.  Once the tube was sutured into place, adequate blood supply was confirmed with blanching and refill.  The pedicle was then wrapped with xeroform gauze and dressed appropriately with a telfa and gauze bandage to ensure continued blood supply and protect the attached pedicle.
Elliptical Excision Additional Text (Leave Blank If You Do Not Want): The margin was drawn around the clinically apparent lesion.  An elliptical shape was then drawn on the skin incorporating the lesion and margins.  Incisions were then made along these lines to the appropriate tissue plane and the lesion was extirpated.
Burow's Advancement Flap Text: The defect edges were debeveled with a #15 scalpel blade.  Given the location of the defect and the proximity to free margins a Burow's advancement flap was deemed most appropriate.  Using a sterile surgical marker, the appropriate advancement flap was drawn incorporating the defect and placing the expected incisions within the relaxed skin tension lines where possible.    The area thus outlined was incised deep to adipose tissue with a #15 scalpel blade.  The skin margins were undermined to an appropriate distance in all directions utilizing iris scissors.
Saucerization Excision Additional Text (Leave Blank If You Do Not Want): The margin was drawn around the clinically apparent lesion.  Incisions were then made along these lines, in a tangential fashion, to the appropriate tissue plane and the lesion was extirpated.
Transposition Flap Text: The defect edges were debeveled with a #15 scalpel blade.  Given the location of the defect and the proximity to free margins a transposition flap was deemed most appropriate.  Using a sterile surgical marker, an appropriate transposition flap was drawn incorporating the defect.    The area thus outlined was incised deep to adipose tissue with a #15 scalpel blade.  The skin margins were undermined to an appropriate distance in all directions utilizing iris scissors.
Repair Performed By Another Provider Text (Leave Blank If You Do Not Want): After the tissue was excised the defect was repaired by another provider.
Purse String (Intermediate) Text: Given the location of the defect and the characteristics of the surrounding skin a purse string intermediate closure was deemed most appropriate.  Undermining was performed circumfirentially around the surgical defect.  A purse string suture was then placed and tightened.
Wound Care: Petrolatum
Complex Repair And V-Y Plasty Text: The defect edges were debeveled with a #15 scalpel blade.  The primary defect was closed partially with a complex linear closure.  Given the location of the remaining defect, shape of the defect and the proximity to free margins a V-Y plasty was deemed most appropriate for complete closure of the defect.  Using a sterile surgical marker, an appropriate advancement flap was drawn incorporating the defect and placing the expected incisions within the relaxed skin tension lines where possible.    The area thus outlined was incised deep to adipose tissue with a #15 scalpel blade.  The skin margins were undermined to an appropriate distance in all directions utilizing iris scissors.

## 2018-10-01 ENCOUNTER — HOSPITAL ENCOUNTER (OUTPATIENT)
Dept: CARDIOLOGY | Facility: MEDICAL CENTER | Age: 83
End: 2018-10-01
Attending: INTERNAL MEDICINE
Payer: MEDICARE

## 2018-10-01 ENCOUNTER — TELEPHONE (OUTPATIENT)
Dept: CARDIOLOGY | Facility: MEDICAL CENTER | Age: 83
End: 2018-10-01

## 2018-10-01 DIAGNOSIS — R01.1 UNDIAGNOSED CARDIAC MURMURS: ICD-10-CM

## 2018-10-01 LAB
LV EJECT FRACT  99904: 60
LV EJECT FRACT MOD 2C 99903: 81.18
LV EJECT FRACT MOD 4C 99902: 77.81
LV EJECT FRACT MOD BP 99901: 78.4

## 2018-10-01 PROCEDURE — 93306 TTE W/DOPPLER COMPLETE: CPT | Mod: 26 | Performed by: INTERNAL MEDICINE

## 2018-10-01 PROCEDURE — 93306 TTE W/DOPPLER COMPLETE: CPT

## 2018-10-02 NOTE — TELEPHONE ENCOUNTER
EC-ECHOCARDIOGRAM COMPLETE W/O CONT   Order: [id number]   Status:  Final result   Visible to patient:  No (Not Released) Dx:  Undiagnosed cardiac murmurs   Notes recorded by Bentley Marshall M.D. on 10/1/2018 at 5:00 PM PDT  The echo looks okay, it shows what I expected that he has moderate tightening of the aortic valve so we will keep an eye on it, please let him know     Thank you     Called pt regarding MD echo results.  Pt states no concerns or questions at this time and verbalizes understanding. Pt is appreciative of information given.

## 2018-10-15 ENCOUNTER — HOSPITAL ENCOUNTER (OUTPATIENT)
Dept: LAB | Facility: MEDICAL CENTER | Age: 83
End: 2018-10-15
Attending: NURSE PRACTITIONER
Payer: MEDICARE

## 2018-10-15 LAB
INR PPP: 2.78 (ref 0.87–1.13)
PROTHROMBIN TIME: 29.4 SEC (ref 12–14.6)

## 2018-10-15 PROCEDURE — 85610 PROTHROMBIN TIME: CPT

## 2018-10-15 PROCEDURE — 36415 COLL VENOUS BLD VENIPUNCTURE: CPT

## 2018-10-16 ENCOUNTER — ANTICOAGULATION MONITORING (OUTPATIENT)
Dept: VASCULAR LAB | Facility: MEDICAL CENTER | Age: 83
End: 2018-10-16

## 2018-10-16 DIAGNOSIS — I48.0 PAROXYSMAL ATRIAL FIBRILLATION (HCC): ICD-10-CM

## 2018-10-16 DIAGNOSIS — Z86.73 HX-TIA (TRANSIENT ISCHEMIC ATTACK): ICD-10-CM

## 2018-10-17 NOTE — PROGRESS NOTES
Anticoagulation Summary  As of 10/16/2018    INR goal:   2.0-3.0   TTR:   77.7 % (3.4 y)   Today's INR:   2.78 (10/15/2018)   Warfarin maintenance plan:   7.5 mg (5 mg x 1.5) on Wed; 5 mg (5 mg x 1) all other days   Weekly warfarin total:   37.5 mg   Plan last modified:   Ibeth Romero, PharmD (7/20/2018)   Next INR check:   11/28/2018   Target end date:   Indefinite    Indications    Paroxysmal atrial fibrillation (HCC) [I48.0]  Hx-TIA (transient ischemic attack) [Z86.73]             Anticoagulation Episode Summary     INR check location:   Outside Lab    Preferred lab:       Send INR reminders to:       Comments:   [phone]      Anticoagulation Care Providers     Provider Role Specialty Phone number    Taz Jc M.D. Referring Cardiology [phone]    Jean Ayala, PharmD Responsible          Anticoagulation Patient Findings      Left voicemail message to report a therapeutic INR of 2.78.    Pt to continue with current warfarin dosing regimen. Requested pt contact the clinic for any s/s of unusual bleeding, bruising, clotting or any changes to diet or medication.    FU INR in 6 week(s).    Mikaela Cotter, PharmD

## 2018-10-25 ENCOUNTER — PATIENT OUTREACH (OUTPATIENT)
Dept: HEALTH INFORMATION MANAGEMENT | Facility: OTHER | Age: 83
End: 2018-10-25

## 2018-10-25 ENCOUNTER — APPOINTMENT (RX ONLY)
Dept: URBAN - METROPOLITAN AREA CLINIC 36 | Facility: CLINIC | Age: 83
Setting detail: DERMATOLOGY
End: 2018-10-25

## 2018-10-25 DIAGNOSIS — Z48.02 ENCOUNTER FOR REMOVAL OF SUTURES: ICD-10-CM

## 2018-10-25 PROCEDURE — 99024 POSTOP FOLLOW-UP VISIT: CPT

## 2018-10-25 PROCEDURE — ? SUTURE REMOVAL (GLOBAL PERIOD)

## 2018-10-25 ASSESSMENT — LOCATION ZONE DERM: LOCATION ZONE: EAR

## 2018-10-25 ASSESSMENT — LOCATION DETAILED DESCRIPTION DERM: LOCATION DETAILED: LEFT POSTERIOR EAR

## 2018-10-25 ASSESSMENT — LOCATION SIMPLE DESCRIPTION DERM: LOCATION SIMPLE: LEFT EAR

## 2018-10-25 NOTE — PROGRESS NOTES
Outcome: Left Message    Please transfer to Patient Outreach Team at [phone] when patient returns call.    WebIZ Checked & Epic Updated:  yes    HealthConnect Verified: yes    Attempt # 1

## 2018-10-25 NOTE — PROCEDURE: SUTURE REMOVAL (GLOBAL PERIOD)
Add 28172 Cpt? (Important Note: In 2017 The Use Of 35704 Is Being Tracked By Cms To Determine Future Global Period Reimbursement For Global Periods): yes
Detail Level: Detailed
Body Location Override (Optional - Billing Will Still Be Based On Selected Body Map Location If Applicable): left helix

## 2018-10-30 ENCOUNTER — NON-PROVIDER VISIT (OUTPATIENT)
Dept: CARDIOLOGY | Facility: MEDICAL CENTER | Age: 83
End: 2018-10-30
Payer: MEDICARE

## 2018-10-30 DIAGNOSIS — Z86.79 HISTORY OF COMPLETE AV BLOCK: ICD-10-CM

## 2018-10-30 DIAGNOSIS — Z95.0 PRESENCE OF PERMANENT CARDIAC PACEMAKER: ICD-10-CM

## 2018-10-30 PROCEDURE — 93280 PM DEVICE PROGR EVAL DUAL: CPT | Performed by: INTERNAL MEDICINE

## 2018-10-30 NOTE — PROGRESS NOTES
Pt daughter declined on his behalf, said that he has a hard time hearing on the phone.     1. Attempt #:2    2. HealthConnect Verified: no    3. Verify PCP: no    4. Review Care Team: no    5. WebIZ Checked & Epic Updated: Yes  · WebIZ Recommendations: FLU and SHINGRIX (Shingles)  · Is patient due for Tdap? NO  · Is patient due for Shingles? YES. Patient was not notified of copay/out of pocket cost.OUT OF STOCK    6. Reviewed/Updated the following with patient:       •   Communication Preference Obtained? YES       •   Preferred Pharmacy? YES       •   Preferred Lab? YES       •   Family History (document living status of immediate family members and if + hx of cancer, diabetes, hypertension, hyperlipidemia, heart attack, stroke) NO    7. Annual Wellness Visit Scheduling  · Scheduling Status:Not Scheduled. Patient states they are not interested     8. Care Gap Scheduling (Attempt to Schedule EACH Overdue Care Gap!)     Health Maintenance Due   Topic Date Due   • IMM DTaP/Tdap/Td Vaccine (1 - Tdap) 09[birth year]1940   • IMM ZOSTER VACCINES (1 of 2) 09/25/1971   • Annual Wellness Visit  08/04/2016   • IMM PNEUMOCOCCAL 65+ (ADULT) HIGH/HIGHEST RISK SERIES (2 of 2 - PPSV23) 04/05/2018        Patient declined AWV     9. Forat Activation: declined    10. YouHelp Kori: no    11. Virtual Visits: no    12. Opt In to Text Messages: yes    13. Patient was NOT advised: “This is a free wellness visit. The provider will screen for medical conditions to help you stay healthy. If you have other concerns to address you may be asked to discuss these at a separate visit or there may be an additional fee.”     14. Patient was NOT informed to arrive 15 min prior to their scheduled appointment and bring in their medication bottles.

## 2018-11-25 DIAGNOSIS — Z79.899 MEDICATION MANAGEMENT: ICD-10-CM

## 2018-11-28 RX ORDER — WARFARIN SODIUM 5 MG/1
TABLET ORAL
Qty: 135 TAB | Refills: 1 | Status: SHIPPED | OUTPATIENT
Start: 2018-11-28 | End: 2019-01-01

## 2018-11-28 NOTE — TELEPHONE ENCOUNTER
Pt is being closely followed by [facility]. Last seen by them 10/18 and WNL at 2.78. Will send 6 months to pharmacy.

## 2018-11-30 ENCOUNTER — HOSPITAL ENCOUNTER (OUTPATIENT)
Dept: RADIOLOGY | Facility: MEDICAL CENTER | Age: 83
End: 2018-11-30
Attending: FAMILY MEDICINE
Payer: MEDICARE

## 2018-11-30 ENCOUNTER — OFFICE VISIT (OUTPATIENT)
Dept: URGENT CARE | Facility: PHYSICIAN GROUP | Age: 83
End: 2018-11-30
Payer: MEDICARE

## 2018-11-30 VITALS
DIASTOLIC BLOOD PRESSURE: 92 MMHG | RESPIRATION RATE: 18 BRPM | HEART RATE: 68 BPM | BODY MASS INDEX: 28.34 KG/M2 | HEIGHT: 64 IN | WEIGHT: 166 LBS | SYSTOLIC BLOOD PRESSURE: 148 MMHG | TEMPERATURE: 98.8 F | OXYGEN SATURATION: 92 %

## 2018-11-30 DIAGNOSIS — J18.9 COMMUNITY ACQUIRED PNEUMONIA OF LEFT LOWER LOBE OF LUNG: ICD-10-CM

## 2018-11-30 DIAGNOSIS — J22 LOWER RESPIRATORY INFECTION: ICD-10-CM

## 2018-11-30 DIAGNOSIS — I10 ESSENTIAL HYPERTENSION: ICD-10-CM

## 2018-11-30 PROCEDURE — 71046 X-RAY EXAM CHEST 2 VIEWS: CPT

## 2018-11-30 PROCEDURE — 99214 OFFICE O/P EST MOD 30 MIN: CPT | Performed by: FAMILY MEDICINE

## 2018-11-30 RX ORDER — LOSARTAN POTASSIUM 25 MG/1
25 TABLET ORAL
Qty: 90 TAB | Refills: 0 | Status: SHIPPED | OUTPATIENT
Start: 2018-11-30 | End: 2019-01-01 | Stop reason: SDUPTHER

## 2018-11-30 RX ORDER — AMOXICILLIN AND CLAVULANATE POTASSIUM 875; 125 MG/1; MG/1
1 TABLET, FILM COATED ORAL 2 TIMES DAILY
Qty: 20 TAB | Refills: 0 | Status: SHIPPED | OUTPATIENT
Start: 2018-11-30 | End: 2018-12-11

## 2018-11-30 ASSESSMENT — ENCOUNTER SYMPTOMS
NAUSEA: 0
DIARRHEA: 0
WHEEZING: 1
SORE THROAT: 0
SPUTUM PRODUCTION: 1
COUGH: 1
SHORTNESS OF BREATH: 1
HEMOPTYSIS: 0
FEVER: 0
ABDOMINAL PAIN: 0
HEADACHES: 0
VOMITING: 0
CHILLS: 0

## 2018-12-01 NOTE — PROGRESS NOTES
Subjective:     Primitivo Milan is a 89 y.o. male who presents for Cough (onset last night, scratch on left arm would like arm he would like checked)      HPI  Pt presents for evaluation of a new problem  Patient is a 89-year-old male with onset of cough last night which is rapidly progressing  Cough is productive with white sputum  Cough last all day, rated as severe, and nothing makes it better  Has associated wheezing present  No history of asthma, COPD, smoking, or inhaler use  Feeling fatigued and did not sleep well last night  Having mild nasal congestion and mild ear pressure  No sore throat or headache    Review of Systems   Constitutional: Negative for chills and fever.   HENT: Positive for congestion and ear pain. Negative for sore throat.    Respiratory: Positive for cough, sputum production, shortness of breath and wheezing. Negative for hemoptysis.    Cardiovascular: Negative for chest pain.   Gastrointestinal: Negative for abdominal pain, diarrhea, nausea and vomiting.   Neurological: Negative for headaches.     PMH:  has a past medical history of Anticoagulation monitoring, special range; Atrial fibrillation (HCC); Atrioventricular block, complete (HCC); Bell's palsy; Cancer (HCC) (2001); Cancer (HCC) (1991); Essential hypertension; Extranodal lymphoma (HCC); Kidney mass; Other dyspnea and respiratory abnormality; Other malignant lymphomas, unspecified site, extranodal and solid organ sites; Paroxysmal atrial fibrillation (HCC); Personal history of malignant neoplasm of prostate; Personal history of venous thrombosis and embolism; Presence of permanent cardiac pacemaker; Renal disorder (1970); Sleep apnea; Stroke (HCC) (1991); and TIA (transient ischemic attack).  MEDS:   Current Outpatient Prescriptions:   •  losartan (COZAAR) 25 MG Tab, TAKE 1 TAB BY MOUTH EVERY DAY., Disp: 90 Tab, Rfl: 0  •  warfarin (COUMADIN) 5 MG Tab, Take 1-1.5 tablets by mouth or as directed by Coumadin Clinic, Disp: 135  "Tab, Rfl: 1  •  Magnesium 400 MG Tab, Take  by mouth., Disp: , Rfl:   •  Calcium Carbonate (CALCIUM 600 PO), Take  by mouth., Disp: , Rfl:   •  DILTIAZem CD (CARDIZEM CD) 240 MG CAPSULE SR 24 HR, TAKE 1 CAP BY MOUTH EVERY DAY., Disp: 90 Cap, Rfl: 3  •  vitamin D (CHOLECALCIFEROL) 1000 UNIT TABS, Take 4,000 Units by mouth every day., Disp: , Rfl:   ALLERGIES:   Allergies   Allergen Reactions   • Iodine      SURGHX:   Past Surgical History:   Procedure Laterality Date   • RECOVERY  5/19/2011    Performed by SURGERY, IR-RECOVERY at SURGERY SAME DAY [facility] ORS   • PACEMAKER INSERTION  September 2009    Medtronic Versa [id number] implanted by Dr. Lexa Lopes.   • EYE SURGERY  2006    left eye mass/ cancer/ s/p radiation   • LITHOTRIPSY  1995    left kidney x 3   • PROSTATECTOMY, RADICAL RETRO  1991   • EYE SURGERY       SOCHX:  reports that he has never smoked. He has never used smokeless tobacco. He reports that he does not drink alcohol or use drugs.  FH: Family history was reviewed, not contributing to acute infection     Objective:   /92   Pulse 68   Temp 37.1 °C (98.8 °F)   Resp 18   Ht 1.626 m (5' 4\")   Wt 75.3 kg (166 lb)   SpO2 92%   BMI 28.49 kg/m²     Physical Exam   Constitutional: He appears well-developed and well-nourished. No distress.   HENT:   Head: Normocephalic and atraumatic.   Right Ear: External ear normal.   Left Ear: External ear normal.   Nose: Nose normal.   Mouth/Throat: Oropharynx is clear and moist. No oropharyngeal exudate.   Eyes: Pupils are equal, round, and reactive to light. Conjunctivae and EOM are normal. Right eye exhibits no discharge. Left eye exhibits no discharge. No scleral icterus.   Neck: Normal range of motion. No tracheal deviation present.   Cardiovascular: Normal rate and regular rhythm.    3/6 systolic ejection murmur best heard at left upper sternal border   Pulmonary/Chest:   Frequently coughing during exam  Moderate air movement bilaterally, diffuse wheezing " present, scattered rhonchi, scattered rales present   Musculoskeletal: Normal range of motion.   Neurological: He is alert.   Skin: Skin is warm and dry. No rash noted. He is not diaphoretic.   Psychiatric: He has a normal mood and affect. His behavior is normal. Judgment and thought content normal.     Assessment/Plan:   Assessment    1. Community acquired pneumonia of left lower lobe of lung (HCC)  Patient with scattered rales and wheezing on exam.  Reviewed his chest x-ray with him in office today and concern for left lower lobe infiltrate.  Will treat empirically with Augmentin and advised patient to have close follow-up.  Even if he is feeling quite a bit better with antibiotics, emphasized importance of following up to be rechecked and make sure his pneumonia has fully resolved.  Red flags reviewed and follow-up precautions given.  Follow-up with PCP  - DX-CHEST-2 VIEWS; Future

## 2018-12-03 DIAGNOSIS — Z79.899 MEDICATION MANAGEMENT: ICD-10-CM

## 2018-12-03 RX ORDER — WARFARIN SODIUM 5 MG/1
TABLET ORAL
Qty: 135 TAB | Refills: 1 | Status: CANCELLED | OUTPATIENT
Start: 2018-12-03

## 2018-12-10 ENCOUNTER — TELEPHONE (OUTPATIENT)
Dept: MEDICAL GROUP | Facility: PHYSICIAN GROUP | Age: 83
End: 2018-12-10

## 2018-12-10 NOTE — TELEPHONE ENCOUNTER
Future Appointments       Provider Department Center    12/11/2018 11:05 AM COLLEEN Patel. [facility]    1/23/2019 2:45 PM PACER CHECK-[facility]     1/23/2019 3:15 PM Bentley Marshall M.D. [facility]         ESTABLISHED PATIENT PRE-VISIT PLANNING     Patient was NOT contacted to complete PVP.       1.  Reviewed notes from the last few office visits within the medical group: Yes    2.  If any orders were placed at last visit or intended to be done for this visit (i.e. 6 mos follow-up), do we have Results/Consult Notes?        •  Labs - Labs were not ordered at last office visit.       •  Imaging - Imaging ordered, completed and results are in chart.       •  Referrals - Referral ordered, patient was seen and consult notes are in chart. Care Teams updated  YES.    3. Is this appointment scheduled as a Hospital Follow-Up? No    4.  Immunizations were updated in [employer] using WebIZ?: Yes       •  Web Iz Recommendations: FLU, TDAP and ZOSTAVAX (Shingles)    5.  Patient is due for the following Health Maintenance Topics:   Health Maintenance Due   Topic Date Due   • IMM DTaP/Tdap/Td Vaccine (1 - Tdap) 09[birth year]1940   • IMM ZOSTER VACCINES (1 of 2) 09/25/1971   • Annual Wellness Visit  08/04/2016   • IMM PNEUMOCOCCAL 65+ (ADULT) HIGH/HIGHEST RISK SERIES (2 of 2 - PPSV23) 04/05/2018     6.  MDX printed for Provider? YES    7.  Patient was NOT informed to arrive 15 min prior to their scheduled appointment and bring in their medication bottles.

## 2018-12-11 ENCOUNTER — OFFICE VISIT (OUTPATIENT)
Dept: MEDICAL GROUP | Facility: PHYSICIAN GROUP | Age: 83
End: 2018-12-11
Payer: MEDICARE

## 2018-12-11 VITALS
DIASTOLIC BLOOD PRESSURE: 88 MMHG | WEIGHT: 165 LBS | BODY MASS INDEX: 28.17 KG/M2 | HEIGHT: 64 IN | HEART RATE: 78 BPM | RESPIRATION RATE: 15 BRPM | SYSTOLIC BLOOD PRESSURE: 148 MMHG | TEMPERATURE: 97.3 F | OXYGEN SATURATION: 98 %

## 2018-12-11 DIAGNOSIS — J18.9 PNEUMONIA DUE TO INFECTIOUS ORGANISM, UNSPECIFIED LATERALITY, UNSPECIFIED PART OF LUNG: ICD-10-CM

## 2018-12-11 PROCEDURE — 99213 OFFICE O/P EST LOW 20 MIN: CPT | Performed by: NURSE PRACTITIONER

## 2018-12-11 NOTE — PROGRESS NOTES
Chief Complaint   Patient presents with   • Other     Pneumococcal FV       HISTORY OF PRESENT ILLNESS: Patient is a 89 y.o. male established patient who presents today to discuss the following issues:    Pneumonia due to infectious organism  Was diagnosed with pneumonia about 2 weeks ago.  Has a few days left of Augmentin.  He reports that he is feeling much better.  He sill has a bit of a cough, but it is caused by post nasal drip.  This had previously been well-controlled with Zyrtec, so he will restart his daily Zyrtec.      Patient Active Problem List    Diagnosis Date Noted   • Paroxysmal atrial fibrillation (HCC)      Priority: High   • Long term current use of anticoagulant therapy      Priority: High   • History of complete AV block      Priority: High   • Presence of permanent cardiac pacemaker      Priority: High   • Essential hypertension      Priority: High   • Hx-TIA (transient ischemic attack)      Priority: High   • History of TIA (transient ischemic attack) and stroke      Priority: Medium   • Personal history of venous thrombosis and embolism      Priority: Medium   • Pneumonia due to infectious organism 12/11/2018   • Atrial fibrillation (HCC) 09/21/2018   • Hearing problem of both ears 09/21/2018   • Balance problem 09/21/2018   • Malignant melanoma (CMS-HCC) 02/10/2018   • Vitamin D deficiency 07/26/2017   • Aortic stenosis, mild 12/11/2012   • Renal mass, left    • Extranodal lymphoma (HCC)    • Personal history of malignant neoplasm of prostate        Allergies:Iodine    Current Outpatient Prescriptions   Medication Sig Dispense Refill   • losartan (COZAAR) 25 MG Tab TAKE 1 TAB BY MOUTH EVERY DAY. 90 Tab 0   • warfarin (COUMADIN) 5 MG Tab Take 1-1.5 tablets by mouth or as directed by [facility] 135 Tab 1   • Magnesium 400 MG Tab Take  by mouth.     • Calcium Carbonate (CALCIUM 600 PO) Take  by mouth.     • DILTIAZem CD (CARDIZEM CD) 240 MG CAPSULE SR 24 HR TAKE 1 CAP BY MOUTH EVERY DAY.  "90 Cap 3   • vitamin D (CHOLECALCIFEROL) 1000 UNIT TABS Take 4,000 Units by mouth every day.       No current facility-administered medications for this visit.        Social History   Substance Use Topics   • Smoking status: Never Smoker   • Smokeless tobacco: Never Used   • Alcohol use No       Family Status   Relation Status   • Mo    • Fa      Family History   Problem Relation Age of Onset   • Heart Disease Mother    • Heart Attack Mother    • Heart Disease Father    • Heart Attack Father        Review of Systems:   Constitutional: Negative for fever, chills, weight loss and malaise/fatigue.   HENT: Negative for ear pain, nosebleeds, congestion, sore throat and neck pain.  Positive for post nasal drip.  Eyes: Negative for blurred vision.   Respiratory: Positive for cough.  Negative for sputum production, shortness of breath and wheezing.    Cardiovascular: Negative for chest pain, palpitations, orthopnea and leg swelling.   Gastrointestinal: Negative for heartburn, nausea, vomiting and abdominal pain.   Genitourinary: Negative for dysuria, urgency and frequency.   Musculoskeletal: Negative for myalgias, joint pain, and back pain.  Skin: Negative for rash and itching.   Neurological: Negative for dizziness, tingling, tremors, sensory change, focal weakness and headaches.   Endo/Heme/Allergies: Does not bruise/bleed easily.   Psychiatric/Behavioral: Negative for depression, suicidal ideas and memory loss.  The patient is not nervous/anxious and does not have insomnia.    All other systems reviewed and are negative except as in HPI.    Exam:  Blood pressure 148/88, pulse 78, temperature 36.3 °C (97.3 °F), resp. rate 15, height 1.626 m (5' 4\"), weight 74.8 kg (165 lb), SpO2 98 %.  General:  Well nourished, well developed male in NAD  Head: Grossly normal.  Neck: Supple without JVD or bruit. Thyroid is not enlarged.  Pulmonary: Clear to ausculation. Normal effort. No rales, ronchi, or " wheezing.  Cardiovascular: Regular rate and rhythm without murmur.   Abdomen:  Soft, nontender, nondistended.  Extremities: No clubbing, cyanosis, or edema.  Skin: Intact with no obvious rashes or lesions.  Neuro: Grossly intact.  Psych: Alert and oriented x 3.  Mood and affect appropriate.    Medical decision-making and discussion: Primitivo is here today for follow-up.  He was encouraegd to finish all of his antibiotics and to restart his Zyrtec.  He will follow-up here as needed.          Assessment/Plan:  1. Pneumonia due to infectious organism, unspecified laterality, unspecified part of lung         Return if symptoms worsen or fail to improve.    Please note that this dictation was created using voice recognition software. I have made every reasonable attempt to correct obvious errors, but I expect that there are errors of grammar and possibly content that I did not discover before finalizing the note.

## 2018-12-11 NOTE — ASSESSMENT & PLAN NOTE
Was diagnosed with pneumonia about 2 weeks ago.  Has a few days left of Augmentin.  He reports that he is feeling much better.  He sill has a bit of a cough, but it is caused by post nasal drip.  This had previously been well-controlled with Zyrtec, so he will restart his daily Zyrtec.

## 2018-12-14 ENCOUNTER — HOSPITAL ENCOUNTER (OUTPATIENT)
Dept: LAB | Facility: MEDICAL CENTER | Age: 83
End: 2018-12-14
Attending: NURSE PRACTITIONER
Payer: MEDICARE

## 2018-12-14 DIAGNOSIS — I48.0 PAROXYSMAL ATRIAL FIBRILLATION (HCC): ICD-10-CM

## 2018-12-14 LAB
INR PPP: 2.87 (ref 0.87–1.13)
PROTHROMBIN TIME: 30.1 SEC (ref 12–14.6)

## 2018-12-14 PROCEDURE — 85610 PROTHROMBIN TIME: CPT

## 2018-12-14 PROCEDURE — 36415 COLL VENOUS BLD VENIPUNCTURE: CPT

## 2018-12-17 ENCOUNTER — ANTICOAGULATION MONITORING (OUTPATIENT)
Dept: VASCULAR LAB | Facility: MEDICAL CENTER | Age: 83
End: 2018-12-17

## 2018-12-17 DIAGNOSIS — I48.0 PAROXYSMAL ATRIAL FIBRILLATION (HCC): ICD-10-CM

## 2018-12-17 DIAGNOSIS — Z86.73 HX-TIA (TRANSIENT ISCHEMIC ATTACK): ICD-10-CM

## 2018-12-17 NOTE — PROGRESS NOTES
Anticoagulation Summary  As of 12/17/2018    INR goal:   2.0-3.0   TTR:   78.7 % (3.6 y)   Today's INR:   2.87 (12/14/2018)   Warfarin maintenance plan:   7.5 mg (5 mg x 1.5) on Wed; 5 mg (5 mg x 1) all other days   Weekly warfarin total:   37.5 mg   Plan last modified:   Ibeth Romero, PharmD (7/20/2018)   Next INR check:      Target end date:   Indefinite    Indications    Paroxysmal atrial fibrillation (HCC) [I48.0]  Hx-TIA (transient ischemic attack) [Z86.73]             Anticoagulation Episode Summary     INR check location:   Outside Lab    Preferred lab:       Send INR reminders to:       Comments:   [phone]      Anticoagulation Care Providers     Provider Role Specialty Phone number    Taz Jc M.D. Referring Cardiology [phone]    Jean Ayala, PharmD Responsible          Anticoagulation Patient Findings    Left voicemail message to report a  therapeutic INR of 2.87.  Pt to continue with current warfarin dosing regimen. Requested pt contact the clinic for any s/s of unusual bleeding, bruising, clotting or any changes to diet or medication.  Follow up in 8 weeks, to reduce the risk of adverse events related to this high risk medication, warfarin.    Saira Bazan, Clinical Pharmacist

## 2019-01-01 ENCOUNTER — ANTICOAGULATION MONITORING (OUTPATIENT)
Dept: VASCULAR LAB | Facility: MEDICAL CENTER | Age: 84
End: 2019-01-01

## 2019-01-01 ENCOUNTER — ANTICOAGULATION VISIT (OUTPATIENT)
Dept: MEDICAL GROUP | Facility: PHYSICIAN GROUP | Age: 84
End: 2019-01-01
Payer: MEDICARE

## 2019-01-01 ENCOUNTER — HOME CARE VISIT (OUTPATIENT)
Dept: HOME HEALTH SERVICES | Facility: HOME HEALTHCARE | Age: 84
End: 2019-01-01
Payer: MEDICARE

## 2019-01-01 ENCOUNTER — HOSPITAL ENCOUNTER (OUTPATIENT)
Dept: LAB | Facility: MEDICAL CENTER | Age: 84
End: 2019-02-05
Attending: INTERNAL MEDICINE
Payer: MEDICARE

## 2019-01-01 ENCOUNTER — ANTICOAGULATION MONITORING (OUTPATIENT)
Dept: MEDICAL GROUP | Facility: MEDICAL CENTER | Age: 84
End: 2019-01-01

## 2019-01-01 ENCOUNTER — OFFICE VISIT (OUTPATIENT)
Dept: MEDICAL GROUP | Facility: PHYSICIAN GROUP | Age: 84
End: 2019-01-01
Payer: MEDICARE

## 2019-01-01 ENCOUNTER — TELEPHONE (OUTPATIENT)
Dept: VASCULAR LAB | Facility: MEDICAL CENTER | Age: 84
End: 2019-01-01

## 2019-01-01 ENCOUNTER — ANTICOAGULATION MONITORING (OUTPATIENT)
Dept: MEDICAL GROUP | Facility: PHYSICIAN GROUP | Age: 84
End: 2019-01-01

## 2019-01-01 ENCOUNTER — PATIENT OUTREACH (OUTPATIENT)
Dept: HEALTH INFORMATION MANAGEMENT | Facility: OTHER | Age: 84
End: 2019-01-01

## 2019-01-01 ENCOUNTER — OFFICE VISIT (OUTPATIENT)
Dept: URGENT CARE | Facility: PHYSICIAN GROUP | Age: 84
End: 2019-01-01
Payer: MEDICARE

## 2019-01-01 ENCOUNTER — HOME HEALTH ADMISSION (OUTPATIENT)
Dept: HOME HEALTH SERVICES | Facility: HOME HEALTHCARE | Age: 84
End: 2019-01-01
Payer: MEDICARE

## 2019-01-01 ENCOUNTER — HOSPITAL ENCOUNTER (OUTPATIENT)
Dept: RADIOLOGY | Facility: MEDICAL CENTER | Age: 84
End: 2019-04-09
Attending: FAMILY MEDICINE
Payer: MEDICARE

## 2019-01-01 ENCOUNTER — HOSPITAL ENCOUNTER (OUTPATIENT)
Dept: LAB | Facility: MEDICAL CENTER | Age: 84
End: 2019-11-21
Attending: FAMILY MEDICINE
Payer: MEDICARE

## 2019-01-01 ENCOUNTER — HOSPITAL ENCOUNTER (OUTPATIENT)
Dept: RADIOLOGY | Facility: MEDICAL CENTER | Age: 84
DRG: 280 | End: 2019-10-08
Attending: PHYSICIAN ASSISTANT
Payer: MEDICARE

## 2019-01-01 ENCOUNTER — APPOINTMENT (OUTPATIENT)
Dept: CARDIOLOGY | Facility: MEDICAL CENTER | Age: 84
DRG: 280 | End: 2019-01-01
Attending: HOSPITALIST
Payer: MEDICARE

## 2019-01-01 ENCOUNTER — OFFICE VISIT (OUTPATIENT)
Dept: CARDIOLOGY | Facility: MEDICAL CENTER | Age: 84
End: 2019-01-01
Payer: MEDICARE

## 2019-01-01 ENCOUNTER — HOSPITAL ENCOUNTER (OUTPATIENT)
Dept: LAB | Facility: MEDICAL CENTER | Age: 84
DRG: 087 | End: 2019-04-15
Attending: NURSE PRACTITIONER
Payer: MEDICARE

## 2019-01-01 ENCOUNTER — TELEPHONE (OUTPATIENT)
Dept: MEDICAL GROUP | Facility: MEDICAL CENTER | Age: 84
End: 2019-01-01

## 2019-01-01 ENCOUNTER — OFFICE VISIT (OUTPATIENT)
Dept: MEDICAL GROUP | Facility: MEDICAL CENTER | Age: 84
End: 2019-01-01
Payer: MEDICARE

## 2019-01-01 ENCOUNTER — HOSPITAL ENCOUNTER (INPATIENT)
Facility: MEDICAL CENTER | Age: 84
LOS: 2 days | DRG: 087 | End: 2019-04-19
Attending: EMERGENCY MEDICINE | Admitting: SURGERY
Payer: MEDICARE

## 2019-01-01 ENCOUNTER — TELEPHONE (OUTPATIENT)
Dept: MEDICAL GROUP | Facility: PHYSICIAN GROUP | Age: 84
End: 2019-01-01

## 2019-01-01 ENCOUNTER — NON-PROVIDER VISIT (OUTPATIENT)
Dept: CARDIOLOGY | Facility: MEDICAL CENTER | Age: 84
End: 2019-01-01
Payer: MEDICARE

## 2019-01-01 ENCOUNTER — APPOINTMENT (OUTPATIENT)
Dept: RADIOLOGY | Facility: MEDICAL CENTER | Age: 84
DRG: 087 | End: 2019-01-01
Attending: SURGERY
Payer: MEDICARE

## 2019-01-01 ENCOUNTER — APPOINTMENT (OUTPATIENT)
Dept: RADIOLOGY | Facility: MEDICAL CENTER | Age: 84
DRG: 087 | End: 2019-01-01
Attending: EMERGENCY MEDICINE
Payer: MEDICARE

## 2019-01-01 ENCOUNTER — HOSPITAL ENCOUNTER (OUTPATIENT)
Dept: LAB | Facility: MEDICAL CENTER | Age: 84
End: 2019-04-05
Attending: NURSE PRACTITIONER
Payer: MEDICARE

## 2019-01-01 ENCOUNTER — APPOINTMENT (OUTPATIENT)
Dept: MEDICAL GROUP | Facility: PHYSICIAN GROUP | Age: 84
End: 2019-01-01
Payer: MEDICARE

## 2019-01-01 ENCOUNTER — HOSPITAL ENCOUNTER (INPATIENT)
Facility: MEDICAL CENTER | Age: 84
LOS: 4 days | DRG: 280 | End: 2019-10-12
Attending: EMERGENCY MEDICINE | Admitting: INTERNAL MEDICINE
Payer: MEDICARE

## 2019-01-01 ENCOUNTER — HOSPITAL ENCOUNTER (OUTPATIENT)
Dept: LAB | Facility: MEDICAL CENTER | Age: 84
End: 2019-02-05
Attending: NURSE PRACTITIONER
Payer: MEDICARE

## 2019-01-01 VITALS
HEART RATE: 80 BPM | HEIGHT: 66 IN | TEMPERATURE: 98.9 F | DIASTOLIC BLOOD PRESSURE: 62 MMHG | BODY MASS INDEX: 25.84 KG/M2 | OXYGEN SATURATION: 95 % | RESPIRATION RATE: 16 BRPM | SYSTOLIC BLOOD PRESSURE: 132 MMHG | WEIGHT: 160.8 LBS

## 2019-01-01 VITALS
DIASTOLIC BLOOD PRESSURE: 80 MMHG | OXYGEN SATURATION: 97 % | HEART RATE: 77 BPM | RESPIRATION RATE: 17 BRPM | SYSTOLIC BLOOD PRESSURE: 140 MMHG | TEMPERATURE: 98.3 F

## 2019-01-01 VITALS
TEMPERATURE: 97.3 F | BODY MASS INDEX: 26.33 KG/M2 | HEART RATE: 74 BPM | RESPIRATION RATE: 18 BRPM | OXYGEN SATURATION: 92 % | HEIGHT: 66 IN | DIASTOLIC BLOOD PRESSURE: 67 MMHG | SYSTOLIC BLOOD PRESSURE: 130 MMHG | WEIGHT: 163.8 LBS

## 2019-01-01 VITALS
TEMPERATURE: 97.8 F | HEIGHT: 66 IN | HEART RATE: 74 BPM | SYSTOLIC BLOOD PRESSURE: 144 MMHG | DIASTOLIC BLOOD PRESSURE: 74 MMHG | OXYGEN SATURATION: 95 % | BODY MASS INDEX: 26.84 KG/M2 | WEIGHT: 167 LBS

## 2019-01-01 VITALS
TEMPERATURE: 98.1 F | SYSTOLIC BLOOD PRESSURE: 138 MMHG | OXYGEN SATURATION: 95 % | RESPIRATION RATE: 18 BRPM | HEART RATE: 86 BPM | DIASTOLIC BLOOD PRESSURE: 75 MMHG

## 2019-01-01 VITALS
TEMPERATURE: 98.9 F | OXYGEN SATURATION: 95 % | HEART RATE: 62 BPM | DIASTOLIC BLOOD PRESSURE: 68 MMHG | SYSTOLIC BLOOD PRESSURE: 135 MMHG | RESPIRATION RATE: 18 BRPM

## 2019-01-01 VITALS
SYSTOLIC BLOOD PRESSURE: 112 MMHG | WEIGHT: 165 LBS | HEART RATE: 73 BPM | TEMPERATURE: 97.6 F | BODY MASS INDEX: 27.16 KG/M2 | HEART RATE: 86 BPM | BODY MASS INDEX: 28.17 KG/M2 | WEIGHT: 163 LBS | DIASTOLIC BLOOD PRESSURE: 70 MMHG | OXYGEN SATURATION: 90 % | HEIGHT: 64 IN | SYSTOLIC BLOOD PRESSURE: 134 MMHG | HEIGHT: 65 IN | DIASTOLIC BLOOD PRESSURE: 72 MMHG | OXYGEN SATURATION: 95 %

## 2019-01-01 VITALS — SYSTOLIC BLOOD PRESSURE: 132 MMHG | DIASTOLIC BLOOD PRESSURE: 60 MMHG | HEART RATE: 60 BPM

## 2019-01-01 VITALS
BODY MASS INDEX: 24.73 KG/M2 | WEIGHT: 163.14 LBS | HEART RATE: 60 BPM | SYSTOLIC BLOOD PRESSURE: 108 MMHG | DIASTOLIC BLOOD PRESSURE: 54 MMHG | HEIGHT: 68 IN | RESPIRATION RATE: 16 BRPM | TEMPERATURE: 98.3 F | OXYGEN SATURATION: 94 %

## 2019-01-01 VITALS
DIASTOLIC BLOOD PRESSURE: 80 MMHG | OXYGEN SATURATION: 97 % | SYSTOLIC BLOOD PRESSURE: 132 MMHG | TEMPERATURE: 98 F | RESPIRATION RATE: 16 BRPM | HEART RATE: 65 BPM

## 2019-01-01 VITALS
SYSTOLIC BLOOD PRESSURE: 132 MMHG | TEMPERATURE: 98 F | HEART RATE: 65 BPM | DIASTOLIC BLOOD PRESSURE: 80 MMHG | OXYGEN SATURATION: 97 % | RESPIRATION RATE: 16 BRPM

## 2019-01-01 VITALS
TEMPERATURE: 97.7 F | BODY MASS INDEX: 28.17 KG/M2 | WEIGHT: 165 LBS | DIASTOLIC BLOOD PRESSURE: 80 MMHG | OXYGEN SATURATION: 91 % | SYSTOLIC BLOOD PRESSURE: 142 MMHG | HEIGHT: 64 IN | HEART RATE: 89 BPM | RESPIRATION RATE: 20 BRPM

## 2019-01-01 VITALS
SYSTOLIC BLOOD PRESSURE: 132 MMHG | HEART RATE: 60 BPM | HEIGHT: 66 IN | DIASTOLIC BLOOD PRESSURE: 70 MMHG | OXYGEN SATURATION: 91 % | BODY MASS INDEX: 27.16 KG/M2 | TEMPERATURE: 97.9 F | WEIGHT: 169 LBS | RESPIRATION RATE: 20 BRPM

## 2019-01-01 VITALS — DIASTOLIC BLOOD PRESSURE: 68 MMHG | HEART RATE: 60 BPM | SYSTOLIC BLOOD PRESSURE: 123 MMHG

## 2019-01-01 VITALS — DIASTOLIC BLOOD PRESSURE: 71 MMHG | SYSTOLIC BLOOD PRESSURE: 125 MMHG | HEART RATE: 63 BPM

## 2019-01-01 VITALS
HEART RATE: 72 BPM | DIASTOLIC BLOOD PRESSURE: 58 MMHG | TEMPERATURE: 97.5 F | HEIGHT: 64 IN | WEIGHT: 165 LBS | SYSTOLIC BLOOD PRESSURE: 102 MMHG | OXYGEN SATURATION: 94 % | BODY MASS INDEX: 28.17 KG/M2

## 2019-01-01 VITALS — SYSTOLIC BLOOD PRESSURE: 120 MMHG | HEART RATE: 62 BPM | DIASTOLIC BLOOD PRESSURE: 71 MMHG

## 2019-01-01 VITALS
SYSTOLIC BLOOD PRESSURE: 132 MMHG | HEART RATE: 65 BPM | DIASTOLIC BLOOD PRESSURE: 82 MMHG | TEMPERATURE: 98 F | RESPIRATION RATE: 17 BRPM | OXYGEN SATURATION: 93 %

## 2019-01-01 VITALS
HEART RATE: 74 BPM | WEIGHT: 164 LBS | BODY MASS INDEX: 26.36 KG/M2 | HEIGHT: 66 IN | DIASTOLIC BLOOD PRESSURE: 64 MMHG | SYSTOLIC BLOOD PRESSURE: 134 MMHG | OXYGEN SATURATION: 96 %

## 2019-01-01 VITALS
SYSTOLIC BLOOD PRESSURE: 124 MMHG | RESPIRATION RATE: 18 BRPM | DIASTOLIC BLOOD PRESSURE: 70 MMHG | TEMPERATURE: 97.8 F | OXYGEN SATURATION: 96 % | HEART RATE: 69 BPM

## 2019-01-01 VITALS
DIASTOLIC BLOOD PRESSURE: 70 MMHG | HEART RATE: 83 BPM | WEIGHT: 171 LBS | OXYGEN SATURATION: 93 % | BODY MASS INDEX: 27.48 KG/M2 | SYSTOLIC BLOOD PRESSURE: 134 MMHG | HEIGHT: 66 IN | TEMPERATURE: 97.1 F

## 2019-01-01 VITALS
DIASTOLIC BLOOD PRESSURE: 70 MMHG | HEART RATE: 87 BPM | SYSTOLIC BLOOD PRESSURE: 130 MMHG | WEIGHT: 163 LBS | OXYGEN SATURATION: 92 % | TEMPERATURE: 97.9 F | BODY MASS INDEX: 27.83 KG/M2 | HEIGHT: 64 IN

## 2019-01-01 VITALS
TEMPERATURE: 97.5 F | WEIGHT: 168 LBS | BODY MASS INDEX: 27 KG/M2 | DIASTOLIC BLOOD PRESSURE: 88 MMHG | HEART RATE: 84 BPM | HEIGHT: 66 IN | OXYGEN SATURATION: 95 % | SYSTOLIC BLOOD PRESSURE: 144 MMHG | RESPIRATION RATE: 16 BRPM

## 2019-01-01 VITALS
HEART RATE: 67 BPM | OXYGEN SATURATION: 98 % | DIASTOLIC BLOOD PRESSURE: 80 MMHG | TEMPERATURE: 98.6 F | SYSTOLIC BLOOD PRESSURE: 120 MMHG | RESPIRATION RATE: 18 BRPM

## 2019-01-01 VITALS
RESPIRATION RATE: 16 BRPM | TEMPERATURE: 98.3 F | HEART RATE: 70 BPM | DIASTOLIC BLOOD PRESSURE: 78 MMHG | OXYGEN SATURATION: 97 % | SYSTOLIC BLOOD PRESSURE: 121 MMHG

## 2019-01-01 VITALS
SYSTOLIC BLOOD PRESSURE: 138 MMHG | TEMPERATURE: 97.8 F | RESPIRATION RATE: 17 BRPM | HEART RATE: 65 BPM | OXYGEN SATURATION: 98 % | DIASTOLIC BLOOD PRESSURE: 80 MMHG

## 2019-01-01 DIAGNOSIS — Z86.73 HX-TIA (TRANSIENT ISCHEMIC ATTACK): ICD-10-CM

## 2019-01-01 DIAGNOSIS — Z79.01 CHRONIC ANTICOAGULATION: ICD-10-CM

## 2019-01-01 DIAGNOSIS — I48.0 PAROXYSMAL ATRIAL FIBRILLATION (HCC): Chronic | ICD-10-CM

## 2019-01-01 DIAGNOSIS — Z86.73 HISTORY OF TIA (TRANSIENT ISCHEMIC ATTACK) AND STROKE: ICD-10-CM

## 2019-01-01 DIAGNOSIS — I48.0 PAROXYSMAL ATRIAL FIBRILLATION (HCC): ICD-10-CM

## 2019-01-01 DIAGNOSIS — I48.0 PAF (PAROXYSMAL ATRIAL FIBRILLATION) (HCC): ICD-10-CM

## 2019-01-01 DIAGNOSIS — I35.0 MODERATE AORTIC STENOSIS: ICD-10-CM

## 2019-01-01 DIAGNOSIS — Z91.81 RISK FOR FALLS: ICD-10-CM

## 2019-01-01 DIAGNOSIS — Z95.0 PRESENCE OF PERMANENT CARDIAC PACEMAKER: Chronic | ICD-10-CM

## 2019-01-01 DIAGNOSIS — Z23 NEED FOR VACCINATION: ICD-10-CM

## 2019-01-01 DIAGNOSIS — Z79.01 CHRONIC ANTICOAGULATION: Primary | ICD-10-CM

## 2019-01-01 DIAGNOSIS — Z09 HOSPITAL DISCHARGE FOLLOW-UP: ICD-10-CM

## 2019-01-01 DIAGNOSIS — I10 ESSENTIAL HYPERTENSION: ICD-10-CM

## 2019-01-01 DIAGNOSIS — Z86.79 HISTORY OF COMPLETE AV BLOCK: ICD-10-CM

## 2019-01-01 DIAGNOSIS — E55.9 VITAMIN D DEFICIENCY: ICD-10-CM

## 2019-01-01 DIAGNOSIS — I48.91 ATRIAL FIBRILLATION, UNSPECIFIED TYPE (HCC): ICD-10-CM

## 2019-01-01 DIAGNOSIS — S06.6X0A SUBARACHNOID HEMORRHAGE FOLLOWING INJURY, NO LOSS OF CONSCIOUSNESS, INITIAL ENCOUNTER (HCC): ICD-10-CM

## 2019-01-01 DIAGNOSIS — R26.89 BALANCE PROBLEM: ICD-10-CM

## 2019-01-01 DIAGNOSIS — Z79.01 ANTICOAGULATED: ICD-10-CM

## 2019-01-01 DIAGNOSIS — S06.6X0D SUBARACHNOID HEMORRHAGE FOLLOWING INJURY, NO LOSS OF CONSCIOUSNESS, SUBSEQUENT ENCOUNTER: ICD-10-CM

## 2019-01-01 DIAGNOSIS — S01.01XD LACERATION OF SCALP, SUBSEQUENT ENCOUNTER: ICD-10-CM

## 2019-01-01 DIAGNOSIS — J40 BRONCHITIS: ICD-10-CM

## 2019-01-01 DIAGNOSIS — R60.0 BILATERAL LOWER EXTREMITY EDEMA: ICD-10-CM

## 2019-01-01 DIAGNOSIS — Z85.46 PERSONAL HISTORY OF MALIGNANT NEOPLASM OF PROSTATE: ICD-10-CM

## 2019-01-01 DIAGNOSIS — N18.4 STAGE 4 CHRONIC KIDNEY DISEASE (HCC): ICD-10-CM

## 2019-01-01 DIAGNOSIS — R60.0 LOWER EXTREMITY EDEMA: ICD-10-CM

## 2019-01-01 DIAGNOSIS — C85.99 EXTRANODAL LYMPHOMA (HCC): ICD-10-CM

## 2019-01-01 DIAGNOSIS — L03.116 CELLULITIS OF LEFT LOWER EXTREMITY: ICD-10-CM

## 2019-01-01 DIAGNOSIS — R05.9 COUGH: ICD-10-CM

## 2019-01-01 DIAGNOSIS — R79.89 ELEVATED SERUM CREATININE: ICD-10-CM

## 2019-01-01 DIAGNOSIS — N18.30 STAGE 3 CHRONIC KIDNEY DISEASE (HCC): ICD-10-CM

## 2019-01-01 DIAGNOSIS — N28.89 RENAL MASS, LEFT: ICD-10-CM
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
## 2019-01-01 DIAGNOSIS — H91.93 HEARING PROBLEM OF BOTH EARS: ICD-10-CM

## 2019-01-01 DIAGNOSIS — C43.9 MALIGNANT MELANOMA, UNSPECIFIED SITE (HCC): ICD-10-CM

## 2019-01-01 DIAGNOSIS — Z79.899 MEDICATION MANAGEMENT: ICD-10-CM

## 2019-01-01 DIAGNOSIS — S61.215A LACERATION OF LEFT RING FINGER WITHOUT FOREIGN BODY WITHOUT DAMAGE TO NAIL, INITIAL ENCOUNTER: ICD-10-CM

## 2019-01-01 DIAGNOSIS — Z79.01 CHRONIC ANTICOAGULATION: Chronic | ICD-10-CM

## 2019-01-01 DIAGNOSIS — Z79.01 LONG TERM CURRENT USE OF ANTICOAGULANT THERAPY: ICD-10-CM

## 2019-01-01 DIAGNOSIS — S01.01XA LACERATION OF SCALP, INITIAL ENCOUNTER: ICD-10-CM

## 2019-01-01 DIAGNOSIS — I44.30 AV BLOCK: ICD-10-CM

## 2019-01-01 DIAGNOSIS — I60.9 SAH (SUBARACHNOID HEMORRHAGE) (HCC): Primary | ICD-10-CM

## 2019-01-01 DIAGNOSIS — I50.33 ACUTE ON CHRONIC DIASTOLIC CONGESTIVE HEART FAILURE (HCC): ICD-10-CM

## 2019-01-01 DIAGNOSIS — Z76.89 ESTABLISHING CARE WITH NEW DOCTOR, ENCOUNTER FOR: ICD-10-CM

## 2019-01-01 DIAGNOSIS — S61.215D LACERATION OF LEFT RING FINGER WITHOUT FOREIGN BODY WITHOUT DAMAGE TO NAIL, SUBSEQUENT ENCOUNTER: ICD-10-CM

## 2019-01-01 DIAGNOSIS — I35.0 MODERATE AORTIC STENOSIS: Chronic | ICD-10-CM

## 2019-01-01 DIAGNOSIS — I87.2 VENOUS STASIS DERMATITIS OF LEFT LOWER EXTREMITY: ICD-10-CM

## 2019-01-01 DIAGNOSIS — I48.21 PERMANENT ATRIAL FIBRILLATION (HCC): Chronic | ICD-10-CM

## 2019-01-01 LAB
ALBUMIN SERPL BCP-MCNC: 3.7 G/DL (ref 3.2–4.9)
ALBUMIN SERPL BCP-MCNC: 3.8 G/DL (ref 3.2–4.9)
ALBUMIN SERPL BCP-MCNC: 4.2 G/DL (ref 3.2–4.9)
ALBUMIN/GLOB SERPL: 1.3 G/DL
ALBUMIN/GLOB SERPL: 1.5 G/DL
ALBUMIN/GLOB SERPL: 1.5 G/DL
ALBUMIN/GLOB SERPL: 1.6 G/DL
ALBUMIN/GLOB SERPL: 1.7 G/DL
ALP SERPL-CCNC: 58 U/L (ref 30–99)
ALP SERPL-CCNC: 61 U/L (ref 30–99)
ALP SERPL-CCNC: 65 U/L (ref 30–99)
ALP SERPL-CCNC: 75 U/L (ref 30–99)
ALP SERPL-CCNC: 84 U/L (ref 30–99)
ALT SERPL-CCNC: 14 U/L (ref 2–50)
ALT SERPL-CCNC: 17 U/L (ref 2–50)
ALT SERPL-CCNC: 18 U/L (ref 2–50)
ALT SERPL-CCNC: 19 U/L (ref 2–50)
ALT SERPL-CCNC: 20 U/L (ref 2–50)
ANION GAP SERPL CALC-SCNC: 10 MMOL/L (ref 0–11.9)
ANION GAP SERPL CALC-SCNC: 10 MMOL/L (ref 0–11.9)
ANION GAP SERPL CALC-SCNC: 13 MMOL/L (ref 0–11.9)
ANION GAP SERPL CALC-SCNC: 13 MMOL/L (ref 0–11.9)
ANION GAP SERPL CALC-SCNC: 7 MMOL/L (ref 0–11.9)
ANION GAP SERPL CALC-SCNC: 8 MMOL/L (ref 0–11.9)
ANION GAP SERPL CALC-SCNC: 9 MMOL/L (ref 0–11.9)
APTT PPP: 46 SEC (ref 24.7–36)
AST SERPL-CCNC: 30 U/L (ref 12–45)
AST SERPL-CCNC: 34 U/L (ref 12–45)
AST SERPL-CCNC: 34 U/L (ref 12–45)
AST SERPL-CCNC: 37 U/L (ref 12–45)
AST SERPL-CCNC: 41 U/L (ref 12–45)
BACTERIA BLD CULT: NORMAL
BACTERIA BLD CULT: NORMAL
BASOPHILS # BLD AUTO: 0.1 % (ref 0–1.8)
BASOPHILS # BLD AUTO: 0.3 % (ref 0–1.8)
BASOPHILS # BLD AUTO: 0.5 % (ref 0–1.8)
BASOPHILS # BLD AUTO: 0.6 % (ref 0–1.8)
BASOPHILS # BLD AUTO: 0.6 % (ref 0–1.8)
BASOPHILS # BLD AUTO: 0.9 % (ref 0–1.8)
BASOPHILS # BLD: 0.01 K/UL (ref 0–0.12)
BASOPHILS # BLD: 0.02 K/UL (ref 0–0.12)
BASOPHILS # BLD: 0.03 K/UL (ref 0–0.12)
BASOPHILS # BLD: 0.03 K/UL (ref 0–0.12)
BASOPHILS # BLD: 0.04 K/UL (ref 0–0.12)
BASOPHILS # BLD: 0.05 K/UL (ref 0–0.12)
BILIRUB SERPL-MCNC: 0.7 MG/DL (ref 0.1–1.5)
BILIRUB SERPL-MCNC: 0.9 MG/DL (ref 0.1–1.5)
BILIRUB SERPL-MCNC: 1.3 MG/DL (ref 0.1–1.5)
BILIRUB SERPL-MCNC: 1.3 MG/DL (ref 0.1–1.5)
BILIRUB SERPL-MCNC: 1.7 MG/DL (ref 0.1–1.5)
BUN SERPL-MCNC: 30 MG/DL (ref 8–22)
BUN SERPL-MCNC: 33 MG/DL (ref 8–22)
BUN SERPL-MCNC: 34 MG/DL (ref 8–22)
BUN SERPL-MCNC: 35 MG/DL (ref 8–22)
BUN SERPL-MCNC: 39 MG/DL (ref 8–22)
BUN SERPL-MCNC: 50 MG/DL (ref 8–22)
BUN SERPL-MCNC: 61 MG/DL (ref 8–22)
CALCIUM SERPL-MCNC: 8.7 MG/DL (ref 8.5–10.5)
CALCIUM SERPL-MCNC: 8.8 MG/DL (ref 8.5–10.5)
CALCIUM SERPL-MCNC: 8.9 MG/DL (ref 8.5–10.5)
CALCIUM SERPL-MCNC: 9 MG/DL (ref 8.5–10.5)
CALCIUM SERPL-MCNC: 9 MG/DL (ref 8.5–10.5)
CALCIUM SERPL-MCNC: 9.3 MG/DL (ref 8.5–10.5)
CALCIUM SERPL-MCNC: 9.5 MG/DL (ref 8.5–10.5)
CALCIUM SERPL-MCNC: 9.5 MG/DL (ref 8.5–10.5)
CALCIUM SERPL-MCNC: 9.8 MG/DL (ref 8.5–10.5)
CHLORIDE SERPL-SCNC: 105 MMOL/L (ref 96–112)
CHLORIDE SERPL-SCNC: 106 MMOL/L (ref 96–112)
CHLORIDE SERPL-SCNC: 108 MMOL/L (ref 96–112)
CHLORIDE SERPL-SCNC: 108 MMOL/L (ref 96–112)
CHLORIDE SERPL-SCNC: 109 MMOL/L (ref 96–112)
CHLORIDE SERPL-SCNC: 112 MMOL/L (ref 96–112)
CHLORIDE SERPL-SCNC: 113 MMOL/L (ref 96–112)
CO2 SERPL-SCNC: 20 MMOL/L (ref 20–33)
CO2 SERPL-SCNC: 20 MMOL/L (ref 20–33)
CO2 SERPL-SCNC: 21 MMOL/L (ref 20–33)
CO2 SERPL-SCNC: 21 MMOL/L (ref 20–33)
CO2 SERPL-SCNC: 22 MMOL/L (ref 20–33)
CO2 SERPL-SCNC: 23 MMOL/L (ref 20–33)
CO2 SERPL-SCNC: 23 MMOL/L (ref 20–33)
CO2 SERPL-SCNC: 25 MMOL/L (ref 20–33)
CO2 SERPL-SCNC: 28 MMOL/L (ref 20–33)
CREAT SERPL-MCNC: 1.37 MG/DL (ref 0.5–1.4)
CREAT SERPL-MCNC: 1.55 MG/DL (ref 0.5–1.4)
CREAT SERPL-MCNC: 1.63 MG/DL (ref 0.5–1.4)
CREAT SERPL-MCNC: 1.64 MG/DL (ref 0.5–1.4)
CREAT SERPL-MCNC: 1.69 MG/DL (ref 0.5–1.4)
CREAT SERPL-MCNC: 1.69 MG/DL (ref 0.5–1.4)
CREAT SERPL-MCNC: 1.75 MG/DL (ref 0.5–1.4)
CREAT SERPL-MCNC: 2.22 MG/DL (ref 0.5–1.4)
CREAT SERPL-MCNC: 2.32 MG/DL (ref 0.5–1.4)
EKG IMPRESSION: NORMAL
EKG IMPRESSION: NORMAL
EOSINOPHIL # BLD AUTO: 0 K/UL (ref 0–0.51)
EOSINOPHIL # BLD AUTO: 0.04 K/UL (ref 0–0.51)
EOSINOPHIL # BLD AUTO: 0.06 K/UL (ref 0–0.51)
EOSINOPHIL # BLD AUTO: 0.12 K/UL (ref 0–0.51)
EOSINOPHIL # BLD AUTO: 0.14 K/UL (ref 0–0.51)
EOSINOPHIL # BLD AUTO: 0.16 K/UL (ref 0–0.51)
EOSINOPHIL NFR BLD: 0 % (ref 0–6.9)
EOSINOPHIL NFR BLD: 0.6 % (ref 0–6.9)
EOSINOPHIL NFR BLD: 0.9 % (ref 0–6.9)
EOSINOPHIL NFR BLD: 1.8 % (ref 0–6.9)
EOSINOPHIL NFR BLD: 2.6 % (ref 0–6.9)
EOSINOPHIL NFR BLD: 3.1 % (ref 0–6.9)
ERYTHROCYTE [DISTWIDTH] IN BLOOD BY AUTOMATED COUNT: 50.1 FL (ref 35.9–50)
ERYTHROCYTE [DISTWIDTH] IN BLOOD BY AUTOMATED COUNT: 50.3 FL (ref 35.9–50)
ERYTHROCYTE [DISTWIDTH] IN BLOOD BY AUTOMATED COUNT: 51 FL (ref 35.9–50)
ERYTHROCYTE [DISTWIDTH] IN BLOOD BY AUTOMATED COUNT: 52.9 FL (ref 35.9–50)
ERYTHROCYTE [DISTWIDTH] IN BLOOD BY AUTOMATED COUNT: 57.6 FL (ref 35.9–50)
ERYTHROCYTE [DISTWIDTH] IN BLOOD BY AUTOMATED COUNT: 58.2 FL (ref 35.9–50)
ERYTHROCYTE [DISTWIDTH] IN BLOOD BY AUTOMATED COUNT: 59.1 FL (ref 35.9–50)
FLUAV RNA SPEC QL NAA+PROBE: NEGATIVE
FLUBV RNA SPEC QL NAA+PROBE: NEGATIVE
GLOBULIN SER CALC-MCNC: 2.3 G/DL (ref 1.9–3.5)
GLOBULIN SER CALC-MCNC: 2.5 G/DL (ref 1.9–3.5)
GLOBULIN SER CALC-MCNC: 2.6 G/DL (ref 1.9–3.5)
GLOBULIN SER CALC-MCNC: 2.7 G/DL (ref 1.9–3.5)
GLOBULIN SER CALC-MCNC: 3 G/DL (ref 1.9–3.5)
GLUCOSE SERPL-MCNC: 104 MG/DL (ref 65–99)
GLUCOSE SERPL-MCNC: 112 MG/DL (ref 65–99)
GLUCOSE SERPL-MCNC: 83 MG/DL (ref 65–99)
GLUCOSE SERPL-MCNC: 88 MG/DL (ref 65–99)
GLUCOSE SERPL-MCNC: 91 MG/DL (ref 65–99)
GLUCOSE SERPL-MCNC: 93 MG/DL (ref 65–99)
GLUCOSE SERPL-MCNC: 96 MG/DL (ref 65–99)
HCT VFR BLD AUTO: 41.4 % (ref 42–52)
HCT VFR BLD AUTO: 41.6 % (ref 42–52)
HCT VFR BLD AUTO: 42.8 % (ref 42–52)
HCT VFR BLD AUTO: 44 % (ref 42–52)
HCT VFR BLD AUTO: 44.6 % (ref 42–52)
HCT VFR BLD AUTO: 45.3 % (ref 42–52)
HCT VFR BLD AUTO: 46.6 % (ref 42–52)
HGB BLD-MCNC: 13.3 G/DL (ref 14–18)
HGB BLD-MCNC: 14 G/DL (ref 14–18)
HGB BLD-MCNC: 14.4 G/DL (ref 14–18)
HGB BLD-MCNC: 14.4 G/DL (ref 14–18)
HGB BLD-MCNC: 14.8 G/DL (ref 14–18)
HGB BLD-MCNC: 14.8 G/DL (ref 14–18)
HGB BLD-MCNC: 15.1 G/DL (ref 14–18)
IMM GRANULOCYTES # BLD AUTO: 0.02 K/UL (ref 0–0.11)
IMM GRANULOCYTES # BLD AUTO: 0.04 K/UL (ref 0–0.11)
IMM GRANULOCYTES NFR BLD AUTO: 0.3 % (ref 0–0.9)
IMM GRANULOCYTES NFR BLD AUTO: 0.4 % (ref 0–0.9)
INR PPP: 1.2 (ref 2–3.5)
INR PPP: 1.2 (ref 2–3.5)
INR PPP: 1.23 (ref 0.87–1.13)
INR PPP: 1.28 (ref 0.87–1.13)
INR PPP: 1.4 (ref 2–3.5)
INR PPP: 1.4 (ref 2–3.5)
INR PPP: 1.64 (ref 0.87–1.13)
INR PPP: 1.9 (ref 2–3.5)
INR PPP: 1.9 (ref 2–3.5)
INR PPP: 2.2 (ref 2–3.5)
INR PPP: 2.27 (ref 0.87–1.13)
INR PPP: 2.33 (ref 0.87–1.13)
INR PPP: 2.4 (ref 2–3.5)
INR PPP: 2.5 (ref 2–3.5)
INR PPP: 2.57 (ref 0.87–1.13)
INR PPP: 2.59 (ref 0.87–1.13)
INR PPP: 2.6 (ref 2–3.5)
INR PPP: 2.8 (ref 2–3.5)
INR PPP: 2.9 (ref 2–3.5)
INR PPP: 3 (ref 2–3.5)
INR PPP: 3.05 (ref 0.87–1.13)
INR PPP: 3.3 (ref 2–3.5)
INR PPP: 3.3 (ref 2–3.5)
INR PPP: 3.5 (ref 2–3.5)
INR PPP: 3.54 (ref 0.87–1.13)
INR PPP: 3.6 (ref 2–3.5)
LACTATE BLD-SCNC: 1.8 MMOL/L (ref 0.5–2)
LYMPHOCYTES # BLD AUTO: 0.42 K/UL (ref 1–4.8)
LYMPHOCYTES # BLD AUTO: 0.7 K/UL (ref 1–4.8)
LYMPHOCYTES # BLD AUTO: 0.74 K/UL (ref 1–4.8)
LYMPHOCYTES # BLD AUTO: 0.84 K/UL (ref 1–4.8)
LYMPHOCYTES # BLD AUTO: 0.96 K/UL (ref 1–4.8)
LYMPHOCYTES # BLD AUTO: 1.09 K/UL (ref 1–4.8)
LYMPHOCYTES NFR BLD: 11 % (ref 22–41)
LYMPHOCYTES NFR BLD: 11.7 % (ref 22–41)
LYMPHOCYTES NFR BLD: 12.3 % (ref 22–41)
LYMPHOCYTES NFR BLD: 18.5 % (ref 22–41)
LYMPHOCYTES NFR BLD: 19.9 % (ref 22–41)
LYMPHOCYTES NFR BLD: 3.8 % (ref 22–41)
MAGNESIUM SERPL-MCNC: 1.8 MG/DL (ref 1.5–2.5)
MAGNESIUM SERPL-MCNC: 1.8 MG/DL (ref 1.5–2.5)
MAGNESIUM SERPL-MCNC: 2 MG/DL (ref 1.5–2.5)
MCH RBC QN AUTO: 31.6 PG (ref 27–33)
MCH RBC QN AUTO: 32 PG (ref 27–33)
MCH RBC QN AUTO: 32.3 PG (ref 27–33)
MCH RBC QN AUTO: 32.4 PG (ref 27–33)
MCH RBC QN AUTO: 32.5 PG (ref 27–33)
MCH RBC QN AUTO: 32.5 PG (ref 27–33)
MCH RBC QN AUTO: 32.8 PG (ref 27–33)
MCHC RBC AUTO-ENTMCNC: 32 G/DL (ref 33.7–35.3)
MCHC RBC AUTO-ENTMCNC: 32.4 G/DL (ref 33.7–35.3)
MCHC RBC AUTO-ENTMCNC: 32.7 G/DL (ref 33.7–35.3)
MCHC RBC AUTO-ENTMCNC: 32.7 G/DL (ref 33.7–35.3)
MCHC RBC AUTO-ENTMCNC: 33.2 G/DL (ref 33.7–35.3)
MCHC RBC AUTO-ENTMCNC: 33.6 G/DL (ref 33.7–35.3)
MCHC RBC AUTO-ENTMCNC: 33.8 G/DL (ref 33.7–35.3)
MCV RBC AUTO: 95.6 FL (ref 81.4–97.8)
MCV RBC AUTO: 96.2 FL (ref 81.4–97.8)
MCV RBC AUTO: 98.7 FL (ref 81.4–97.8)
MCV RBC AUTO: 98.8 FL (ref 81.4–97.8)
MCV RBC AUTO: 98.9 FL (ref 81.4–97.8)
MCV RBC AUTO: 99.3 FL (ref 81.4–97.8)
MCV RBC AUTO: 99.3 FL (ref 81.4–97.8)
MONOCYTES # BLD AUTO: 0.24 K/UL (ref 0–0.85)
MONOCYTES # BLD AUTO: 0.66 K/UL (ref 0–0.85)
MONOCYTES # BLD AUTO: 0.75 K/UL (ref 0–0.85)
MONOCYTES # BLD AUTO: 0.76 K/UL (ref 0–0.85)
MONOCYTES # BLD AUTO: 0.78 K/UL (ref 0–0.85)
MONOCYTES # BLD AUTO: 0.79 K/UL (ref 0–0.85)
MONOCYTES NFR BLD AUTO: 11.5 % (ref 0–13.4)
MONOCYTES NFR BLD AUTO: 11.8 % (ref 0–13.4)
MONOCYTES NFR BLD AUTO: 12.1 % (ref 0–13.4)
MONOCYTES NFR BLD AUTO: 15.2 % (ref 0–13.4)
MONOCYTES NFR BLD AUTO: 3.8 % (ref 0–13.4)
MONOCYTES NFR BLD AUTO: 6.8 % (ref 0–13.4)
NEUTROPHILS # BLD AUTO: 3.23 K/UL (ref 1.82–7.42)
NEUTROPHILS # BLD AUTO: 3.51 K/UL (ref 1.82–7.42)
NEUTROPHILS # BLD AUTO: 4.8 K/UL (ref 1.82–7.42)
NEUTROPHILS # BLD AUTO: 5.01 K/UL (ref 1.82–7.42)
NEUTROPHILS # BLD AUTO: 5.27 K/UL (ref 1.82–7.42)
NEUTROPHILS # BLD AUTO: 9.87 K/UL (ref 1.82–7.42)
NEUTROPHILS NFR BLD: 62.2 % (ref 44–72)
NEUTROPHILS NFR BLD: 64.1 % (ref 44–72)
NEUTROPHILS NFR BLD: 73.5 % (ref 44–72)
NEUTROPHILS NFR BLD: 75.7 % (ref 44–72)
NEUTROPHILS NFR BLD: 83.1 % (ref 44–72)
NEUTROPHILS NFR BLD: 88.9 % (ref 44–72)
NRBC # BLD AUTO: 0 K/UL
NRBC BLD-RTO: 0 /100 WBC
NT-PROBNP SERPL IA-MCNC: 1668 PG/ML (ref 0–125)
NT-PROBNP SERPL IA-MCNC: 3558 PG/ML (ref 0–125)
NT-PROBNP SERPL IA-MCNC: 3904 PG/ML (ref 0–125)
PHOSPHATE SERPL-MCNC: 2.6 MG/DL (ref 2.5–4.5)
PLATELET # BLD AUTO: 157 K/UL (ref 164–446)
PLATELET # BLD AUTO: 160 K/UL (ref 164–446)
PLATELET # BLD AUTO: 165 K/UL (ref 164–446)
PLATELET # BLD AUTO: 172 K/UL (ref 164–446)
PLATELET # BLD AUTO: 174 K/UL (ref 164–446)
PLATELET # BLD AUTO: 174 K/UL (ref 164–446)
PLATELET # BLD AUTO: 175 K/UL (ref 164–446)
PMV BLD AUTO: 10 FL (ref 9–12.9)
PMV BLD AUTO: 10.2 FL (ref 9–12.9)
PMV BLD AUTO: 10.3 FL (ref 9–12.9)
PMV BLD AUTO: 10.4 FL (ref 9–12.9)
PMV BLD AUTO: 10.6 FL (ref 9–12.9)
PMV BLD AUTO: 10.7 FL (ref 9–12.9)
PMV BLD AUTO: 11.1 FL (ref 9–12.9)
POTASSIUM SERPL-SCNC: 4.1 MMOL/L (ref 3.6–5.5)
POTASSIUM SERPL-SCNC: 4.3 MMOL/L (ref 3.6–5.5)
POTASSIUM SERPL-SCNC: 4.4 MMOL/L (ref 3.6–5.5)
POTASSIUM SERPL-SCNC: 4.4 MMOL/L (ref 3.6–5.5)
POTASSIUM SERPL-SCNC: 4.5 MMOL/L (ref 3.6–5.5)
POTASSIUM SERPL-SCNC: 4.6 MMOL/L (ref 3.6–5.5)
POTASSIUM SERPL-SCNC: 4.9 MMOL/L (ref 3.6–5.5)
PROCALCITONIN SERPL-MCNC: 0.43 NG/ML
PROCALCITONIN SERPL-MCNC: 0.54 NG/ML
PROT SERPL-MCNC: 6.1 G/DL (ref 6–8.2)
PROT SERPL-MCNC: 6.2 G/DL (ref 6–8.2)
PROT SERPL-MCNC: 6.4 G/DL (ref 6–8.2)
PROT SERPL-MCNC: 6.8 G/DL (ref 6–8.2)
PROT SERPL-MCNC: 6.9 G/DL (ref 6–8.2)
PROTHROMBIN TIME: 15.7 SEC (ref 12–14.6)
PROTHROMBIN TIME: 16.1 SEC (ref 12–14.6)
PROTHROMBIN TIME: 19.9 SEC (ref 12–14.6)
PROTHROMBIN TIME: 25.6 SEC (ref 12–14.6)
PROTHROMBIN TIME: 25.8 SEC (ref 12–14.6)
PROTHROMBIN TIME: 27.6 SEC (ref 12–14.6)
PROTHROMBIN TIME: 27.8 SEC (ref 12–14.6)
PROTHROMBIN TIME: 31.6 SEC (ref 12–14.6)
PROTHROMBIN TIME: 36.9 SEC (ref 12–14.6)
RBC # BLD AUTO: 4.21 M/UL (ref 4.7–6.1)
RBC # BLD AUTO: 4.33 M/UL (ref 4.7–6.1)
RBC # BLD AUTO: 4.43 M/UL (ref 4.7–6.1)
RBC # BLD AUTO: 4.45 M/UL (ref 4.7–6.1)
RBC # BLD AUTO: 4.51 M/UL (ref 4.7–6.1)
RBC # BLD AUTO: 4.56 M/UL (ref 4.7–6.1)
RBC # BLD AUTO: 4.72 M/UL (ref 4.7–6.1)
SIGNIFICANT IND 70042: NORMAL
SIGNIFICANT IND 70042: NORMAL
SITE SITE: NORMAL
SITE SITE: NORMAL
SODIUM SERPL-SCNC: 134 MMOL/L (ref 135–145)
SODIUM SERPL-SCNC: 136 MMOL/L (ref 135–145)
SODIUM SERPL-SCNC: 138 MMOL/L (ref 135–145)
SODIUM SERPL-SCNC: 141 MMOL/L (ref 135–145)
SODIUM SERPL-SCNC: 141 MMOL/L (ref 135–145)
SODIUM SERPL-SCNC: 142 MMOL/L (ref 135–145)
SODIUM SERPL-SCNC: 142 MMOL/L (ref 135–145)
SODIUM SERPL-SCNC: 143 MMOL/L (ref 135–145)
SODIUM SERPL-SCNC: 143 MMOL/L (ref 135–145)
SOURCE SOURCE: NORMAL
SOURCE SOURCE: NORMAL
TROPONIN T SERPL-MCNC: 33 NG/L (ref 6–19)
WBC # BLD AUTO: 11.1 K/UL (ref 4.8–10.8)
WBC # BLD AUTO: 5.2 K/UL (ref 4.8–10.8)
WBC # BLD AUTO: 5.5 K/UL (ref 4.8–10.8)
WBC # BLD AUTO: 5.9 K/UL (ref 4.8–10.8)
WBC # BLD AUTO: 6.3 K/UL (ref 4.8–10.8)
WBC # BLD AUTO: 6.4 K/UL (ref 4.8–10.8)
WBC # BLD AUTO: 6.8 K/UL (ref 4.8–10.8)

## 2019-01-01 PROCEDURE — 700111 HCHG RX REV CODE 636 W/ 250 OVERRIDE (IP): Performed by: EMERGENCY MEDICINE

## 2019-01-01 PROCEDURE — 700112 HCHG RX REV CODE 229: Performed by: SURGERY

## 2019-01-01 PROCEDURE — 99222 1ST HOSP IP/OBS MODERATE 55: CPT | Performed by: INTERNAL MEDICINE

## 2019-01-01 PROCEDURE — 80053 COMPREHEN METABOLIC PANEL: CPT

## 2019-01-01 PROCEDURE — 85027 COMPLETE CBC AUTOMATED: CPT

## 2019-01-01 PROCEDURE — 83735 ASSAY OF MAGNESIUM: CPT

## 2019-01-01 PROCEDURE — 99239 HOSP IP/OBS DSCHRG MGMT >30: CPT | Performed by: HOSPITALIST

## 2019-01-01 PROCEDURE — 94760 N-INVAS EAR/PLS OXIMETRY 1: CPT

## 2019-01-01 PROCEDURE — 85610 PROTHROMBIN TIME: CPT | Performed by: FAMILY MEDICINE

## 2019-01-01 PROCEDURE — G0493 RN CARE EA 15 MIN HH/HOSPICE: HCPCS

## 2019-01-01 PROCEDURE — 700111 HCHG RX REV CODE 636 W/ 250 OVERRIDE (IP): Performed by: INTERNAL MEDICINE

## 2019-01-01 PROCEDURE — 85610 PROTHROMBIN TIME: CPT

## 2019-01-01 PROCEDURE — 99232 SBSQ HOSP IP/OBS MODERATE 35: CPT | Performed by: HOSPITALIST

## 2019-01-01 PROCEDURE — 700105 HCHG RX REV CODE 258: Performed by: EMERGENCY MEDICINE

## 2019-01-01 PROCEDURE — 99291 CRITICAL CARE FIRST HOUR: CPT

## 2019-01-01 PROCEDURE — 99223 1ST HOSP IP/OBS HIGH 75: CPT | Performed by: INTERNAL MEDICINE

## 2019-01-01 PROCEDURE — 93793 ANTICOAG MGMT PT WARFARIN: CPT | Performed by: NURSE PRACTITIONER

## 2019-01-01 PROCEDURE — 83880 ASSAY OF NATRIURETIC PEPTIDE: CPT

## 2019-01-01 PROCEDURE — 93280 PM DEVICE PROGR EVAL DUAL: CPT | Performed by: NURSE PRACTITIONER

## 2019-01-01 PROCEDURE — 700101 HCHG RX REV CODE 250: Performed by: NURSE PRACTITIONER

## 2019-01-01 PROCEDURE — 700111 HCHG RX REV CODE 636 W/ 250 OVERRIDE (IP): Performed by: NURSE PRACTITIONER

## 2019-01-01 PROCEDURE — 99354 PR PROLONGED SVC OUTPATIENT SETTING 1ST HOUR: CPT | Performed by: PHYSICIAN ASSISTANT

## 2019-01-01 PROCEDURE — 97162 PT EVAL MOD COMPLEX 30 MIN: CPT

## 2019-01-01 PROCEDURE — G0151 HHCP-SERV OF PT,EA 15 MIN: HCPCS

## 2019-01-01 PROCEDURE — 700102 HCHG RX REV CODE 250 W/ 637 OVERRIDE(OP): Performed by: INTERNAL MEDICINE

## 2019-01-01 PROCEDURE — 85610 PROTHROMBIN TIME: CPT | Performed by: NURSE PRACTITIONER

## 2019-01-01 PROCEDURE — 85025 COMPLETE CBC W/AUTO DIFF WBC: CPT

## 2019-01-01 PROCEDURE — 87502 INFLUENZA DNA AMP PROBE: CPT

## 2019-01-01 PROCEDURE — 700111 HCHG RX REV CODE 636 W/ 250 OVERRIDE (IP): Performed by: SURGERY

## 2019-01-01 PROCEDURE — 99211 OFF/OP EST MAY X REQ PHY/QHP: CPT | Performed by: FAMILY MEDICINE

## 2019-01-01 PROCEDURE — 36415 COLL VENOUS BLD VENIPUNCTURE: CPT

## 2019-01-01 PROCEDURE — 770020 HCHG ROOM/CARE - TELE (206)

## 2019-01-01 PROCEDURE — 93005 ELECTROCARDIOGRAM TRACING: CPT

## 2019-01-01 PROCEDURE — 97530 THERAPEUTIC ACTIVITIES: CPT

## 2019-01-01 PROCEDURE — 92523 SPEECH SOUND LANG COMPREHEN: CPT

## 2019-01-01 PROCEDURE — 84145 PROCALCITONIN (PCT): CPT

## 2019-01-01 PROCEDURE — 87040 BLOOD CULTURE FOR BACTERIA: CPT | Mod: 91

## 2019-01-01 PROCEDURE — 770001 HCHG ROOM/CARE - MED/SURG/GYN PRIV*

## 2019-01-01 PROCEDURE — 99214 OFFICE O/P EST MOD 30 MIN: CPT | Performed by: FAMILY MEDICINE

## 2019-01-01 PROCEDURE — 70450 CT HEAD/BRAIN W/O DYE: CPT

## 2019-01-01 PROCEDURE — 700101 HCHG RX REV CODE 250: Performed by: INTERNAL MEDICINE

## 2019-01-01 PROCEDURE — 700101 HCHG RX REV CODE 250: Performed by: SURGERY

## 2019-01-01 PROCEDURE — 99495 TRANSJ CARE MGMT MOD F2F 14D: CPT | Performed by: FAMILY MEDICINE

## 2019-01-01 PROCEDURE — 99214 OFFICE O/P EST MOD 30 MIN: CPT | Performed by: NURSE PRACTITIONER

## 2019-01-01 PROCEDURE — 97165 OT EVAL LOW COMPLEX 30 MIN: CPT

## 2019-01-01 PROCEDURE — 80048 BASIC METABOLIC PNL TOTAL CA: CPT

## 2019-01-01 PROCEDURE — A9270 NON-COVERED ITEM OR SERVICE: HCPCS | Performed by: SURGERY

## 2019-01-01 PROCEDURE — 97535 SELF CARE MNGMENT TRAINING: CPT

## 2019-01-01 PROCEDURE — 94640 AIRWAY INHALATION TREATMENT: CPT

## 2019-01-01 PROCEDURE — 85730 THROMBOPLASTIN TIME PARTIAL: CPT

## 2019-01-01 PROCEDURE — G0299 HHS/HOSPICE OF RN EA 15 MIN: HCPCS

## 2019-01-01 PROCEDURE — 93793 ANTICOAG MGMT PT WARFARIN: CPT | Performed by: FAMILY MEDICINE

## 2019-01-01 PROCEDURE — 700105 HCHG RX REV CODE 258: Performed by: SURGERY

## 2019-01-01 PROCEDURE — A9270 NON-COVERED ITEM OR SERVICE: HCPCS | Performed by: HOSPITALIST

## 2019-01-01 PROCEDURE — 99214 OFFICE O/P EST MOD 30 MIN: CPT | Performed by: PHYSICIAN ASSISTANT

## 2019-01-01 PROCEDURE — 99233 SBSQ HOSP IP/OBS HIGH 50: CPT | Performed by: SURGERY

## 2019-01-01 PROCEDURE — A9270 NON-COVERED ITEM OR SERVICE: HCPCS | Performed by: INTERNAL MEDICINE

## 2019-01-01 PROCEDURE — 99232 SBSQ HOSP IP/OBS MODERATE 35: CPT | Performed by: INTERNAL MEDICINE

## 2019-01-01 PROCEDURE — 700111 HCHG RX REV CODE 636 W/ 250 OVERRIDE (IP): Performed by: HOSPITALIST

## 2019-01-01 PROCEDURE — 770022 HCHG ROOM/CARE - ICU (200)

## 2019-01-01 PROCEDURE — 90732 PPSV23 VACC 2 YRS+ SUBQ/IM: CPT | Performed by: NURSE PRACTITIONER

## 2019-01-01 PROCEDURE — 97166 OT EVAL MOD COMPLEX 45 MIN: CPT

## 2019-01-01 PROCEDURE — 665001 SOC-HOME HEALTH

## 2019-01-01 PROCEDURE — 99233 SBSQ HOSP IP/OBS HIGH 50: CPT | Performed by: HOSPITALIST

## 2019-01-01 PROCEDURE — 700102 HCHG RX REV CODE 250 W/ 637 OVERRIDE(OP): Performed by: HOSPITALIST

## 2019-01-01 PROCEDURE — 97116 GAIT TRAINING THERAPY: CPT

## 2019-01-01 PROCEDURE — 303747 HCHG EXTRA SUTURE

## 2019-01-01 PROCEDURE — 0HQGXZZ REPAIR LEFT HAND SKIN, EXTERNAL APPROACH: ICD-10-PCS | Performed by: EMERGENCY MEDICINE

## 2019-01-01 PROCEDURE — 0HQ0XZZ REPAIR SCALP SKIN, EXTERNAL APPROACH: ICD-10-PCS | Performed by: EMERGENCY MEDICINE

## 2019-01-01 PROCEDURE — G0009 ADMIN PNEUMOCOCCAL VACCINE: HCPCS | Performed by: NURSE PRACTITIONER

## 2019-01-01 PROCEDURE — 84100 ASSAY OF PHOSPHORUS: CPT

## 2019-01-01 PROCEDURE — 305308 HCHG STAPLER,SKIN,DISP.

## 2019-01-01 PROCEDURE — 83605 ASSAY OF LACTIC ACID: CPT

## 2019-01-01 PROCEDURE — 93005 ELECTROCARDIOGRAM TRACING: CPT | Performed by: EMERGENCY MEDICINE

## 2019-01-01 PROCEDURE — 84484 ASSAY OF TROPONIN QUANT: CPT

## 2019-01-01 PROCEDURE — 99215 OFFICE O/P EST HI 40 MIN: CPT | Mod: 25 | Performed by: NURSE PRACTITIONER

## 2019-01-01 PROCEDURE — C9132 KCENTRA, PER I.U.: HCPCS | Mod: JG | Performed by: EMERGENCY MEDICINE

## 2019-01-01 PROCEDURE — 700102 HCHG RX REV CODE 250 W/ 637 OVERRIDE(OP): Performed by: SURGERY

## 2019-01-01 PROCEDURE — 71046 X-RAY EXAM CHEST 2 VIEWS: CPT

## 2019-01-01 PROCEDURE — 304999 HCHG REPAIR-SIMPLE/INTERMED LEVEL 1

## 2019-01-01 PROCEDURE — 96365 THER/PROPH/DIAG IV INF INIT: CPT

## 2019-01-01 PROCEDURE — 93279 PRGRMG DEV EVAL PM/LDLS PM: CPT | Performed by: INTERNAL MEDICINE

## 2019-01-01 PROCEDURE — 304217 HCHG IRRIGATION SYSTEM

## 2019-01-01 PROCEDURE — 99214 OFFICE O/P EST MOD 30 MIN: CPT | Mod: 25 | Performed by: INTERNAL MEDICINE

## 2019-01-01 PROCEDURE — 99285 EMERGENCY DEPT VISIT HI MDM: CPT

## 2019-01-01 PROCEDURE — G0180 MD CERTIFICATION HHA PATIENT: HCPCS | Performed by: INTERNAL MEDICINE

## 2019-01-01 PROCEDURE — 700101 HCHG RX REV CODE 250: Performed by: HOSPITALIST

## 2019-01-01 PROCEDURE — G0390 TRAUMA RESPONS W/HOSP CRITI: HCPCS

## 2019-01-01 PROCEDURE — 93971 EXTREMITY STUDY: CPT | Mod: LT

## 2019-01-01 PROCEDURE — 700101 HCHG RX REV CODE 250: Performed by: EMERGENCY MEDICINE

## 2019-01-01 PROCEDURE — 8041 PR SCP AHA: Performed by: NURSE PRACTITIONER

## 2019-01-01 RX ORDER — FUROSEMIDE 20 MG/1
TABLET ORAL
Qty: 30 TAB | Refills: 1 | Status: SHIPPED | OUTPATIENT
Start: 2019-01-01 | End: 2019-01-01 | Stop reason: SDUPTHER

## 2019-01-01 RX ORDER — WARFARIN SODIUM 2.5 MG/1
2.5 TABLET ORAL
Status: DISCONTINUED | OUTPATIENT
Start: 2019-01-01 | End: 2019-01-01

## 2019-01-01 RX ORDER — IBUPROFEN 200 MG
950 CAPSULE ORAL DAILY
Status: DISCONTINUED | OUTPATIENT
Start: 2019-01-01 | End: 2019-01-01 | Stop reason: HOSPADM

## 2019-01-01 RX ORDER — WARFARIN SODIUM 5 MG/1
TABLET ORAL
Qty: 135 TAB | Refills: 1 | Status: SHIPPED | OUTPATIENT
Start: 2019-01-01 | End: 2020-01-01

## 2019-01-01 RX ORDER — AMOXICILLIN 250 MG
1 CAPSULE ORAL
Status: DISCONTINUED | OUTPATIENT
Start: 2019-01-01 | End: 2019-01-01 | Stop reason: HOSPADM

## 2019-01-01 RX ORDER — WARFARIN SODIUM 5 MG/1
5-7.5 TABLET ORAL DAILY
Qty: 45 TAB | Refills: 0 | OUTPATIENT
Start: 2019-01-01 | End: 2019-01-01

## 2019-01-01 RX ORDER — DILTIAZEM HYDROCHLORIDE 120 MG/1
120 CAPSULE, COATED, EXTENDED RELEASE ORAL
Qty: 30 CAP | Refills: 3 | Status: SHIPPED | OUTPATIENT
Start: 2019-01-01 | End: 2019-01-01 | Stop reason: SDUPTHER

## 2019-01-01 RX ORDER — ACETAMINOPHEN 325 MG/1
650 TABLET ORAL EVERY 4 HOURS PRN
Status: DISCONTINUED | OUTPATIENT
Start: 2019-01-01 | End: 2019-01-01 | Stop reason: HOSPADM

## 2019-01-01 RX ORDER — WARFARIN SODIUM 5 MG/1
5 TABLET ORAL
Status: DISCONTINUED | OUTPATIENT
Start: 2019-01-01 | End: 2019-01-01

## 2019-01-01 RX ORDER — AMOXICILLIN 250 MG
2 CAPSULE ORAL 2 TIMES DAILY
Status: DISCONTINUED | OUTPATIENT
Start: 2019-01-01 | End: 2019-01-01 | Stop reason: HOSPADM

## 2019-01-01 RX ORDER — IBUPROFEN 200 MG
950 CAPSULE ORAL DAILY
COMMUNITY
End: 2019-01-01

## 2019-01-01 RX ORDER — BACITRACIN ZINC AND POLYMYXIN B SULFATE 500; 1000 [USP'U]/G; [USP'U]/G
OINTMENT TOPICAL 3 TIMES DAILY
Status: DISCONTINUED | OUTPATIENT
Start: 2019-01-01 | End: 2019-01-01

## 2019-01-01 RX ORDER — POTASSIUM CHLORIDE 20 MEQ/1
20 TABLET, EXTENDED RELEASE ORAL
Qty: 90 TAB | Refills: 1 | Status: SHIPPED | OUTPATIENT
Start: 2019-01-01 | End: 2020-01-01 | Stop reason: SDUPTHER

## 2019-01-01 RX ORDER — POTASSIUM CHLORIDE 20 MEQ/1
TABLET, EXTENDED RELEASE ORAL
Qty: 30 TAB | Refills: 1 | Status: SHIPPED | OUTPATIENT
Start: 2019-01-01 | End: 2019-01-01 | Stop reason: SDUPTHER

## 2019-01-01 RX ORDER — DILTIAZEM HYDROCHLORIDE 120 MG/1
240 CAPSULE, COATED, EXTENDED RELEASE ORAL
Status: DISCONTINUED | OUTPATIENT
Start: 2019-01-01 | End: 2019-01-01

## 2019-01-01 RX ORDER — OXYCODONE HYDROCHLORIDE 10 MG/1
10 TABLET ORAL
Status: DISCONTINUED | OUTPATIENT
Start: 2019-01-01 | End: 2019-01-01

## 2019-01-01 RX ORDER — FUROSEMIDE 20 MG/1
20 TABLET ORAL
Qty: 30 TAB | Refills: 0 | Status: SHIPPED | OUTPATIENT
Start: 2019-01-01 | End: 2019-01-01 | Stop reason: SDUPTHER

## 2019-01-01 RX ORDER — TRIAMCINOLONE ACETONIDE 5 MG/G
1 OINTMENT TOPICAL 2 TIMES DAILY
Qty: 1 TUBE | Refills: 0 | Status: SHIPPED | OUTPATIENT
Start: 2019-01-01 | End: 2019-01-01

## 2019-01-01 RX ORDER — FUROSEMIDE 20 MG/1
20 TABLET ORAL
Qty: 90 TAB | Refills: 0 | OUTPATIENT
Start: 2019-01-01

## 2019-01-01 RX ORDER — WARFARIN SODIUM 5 MG/1
5 TABLET ORAL DAILY
Status: DISCONTINUED | OUTPATIENT
Start: 2019-01-01 | End: 2019-01-01 | Stop reason: HOSPADM

## 2019-01-01 RX ORDER — POTASSIUM CHLORIDE 20 MEQ/1
TABLET, EXTENDED RELEASE ORAL
Qty: 30 TAB | Refills: 1 | Status: SHIPPED | OUTPATIENT
Start: 2019-01-01 | End: 2019-01-01

## 2019-01-01 RX ORDER — LOSARTAN POTASSIUM 25 MG/1
TABLET ORAL
Qty: 90 TAB | Refills: 1 | Status: SHIPPED | OUTPATIENT
Start: 2019-01-01 | End: 2019-01-01 | Stop reason: SDUPTHER

## 2019-01-01 RX ORDER — FUROSEMIDE 20 MG/1
20 TABLET ORAL
Qty: 180 TAB | Refills: 1 | Status: SHIPPED | OUTPATIENT
Start: 2019-01-01 | End: 2020-01-01

## 2019-01-01 RX ORDER — POLYETHYLENE GLYCOL 3350 17 G/17G
1 POWDER, FOR SOLUTION ORAL 2 TIMES DAILY
Status: DISCONTINUED | OUTPATIENT
Start: 2019-01-01 | End: 2019-01-01 | Stop reason: HOSPADM

## 2019-01-01 RX ORDER — DOCUSATE SODIUM 100 MG/1
100 CAPSULE, LIQUID FILLED ORAL 2 TIMES DAILY
Status: DISCONTINUED | OUTPATIENT
Start: 2019-01-01 | End: 2019-01-01 | Stop reason: HOSPADM

## 2019-01-01 RX ORDER — TRIAMCINOLONE ACETONIDE 1 MG/G
1 CREAM TOPICAL 2 TIMES DAILY PRN
Qty: 1 TUBE | Refills: 11 | Status: SHIPPED | OUTPATIENT
Start: 2019-01-01 | End: 2020-01-01

## 2019-01-01 RX ORDER — WARFARIN SODIUM 5 MG/1
5-7.5 TABLET ORAL EVERY EVENING
Status: ON HOLD | COMMUNITY
End: 2019-01-01

## 2019-01-01 RX ORDER — IPRATROPIUM BROMIDE AND ALBUTEROL SULFATE 2.5; .5 MG/3ML; MG/3ML
3 SOLUTION RESPIRATORY (INHALATION)
Status: DISCONTINUED | OUTPATIENT
Start: 2019-01-01 | End: 2019-01-01 | Stop reason: HOSPADM

## 2019-01-01 RX ORDER — LOSARTAN POTASSIUM 25 MG/1
TABLET ORAL
Qty: 100 TAB | Refills: 1 | Status: ON HOLD | OUTPATIENT
Start: 2019-01-01 | End: 2019-01-01

## 2019-01-01 RX ORDER — DILTIAZEM HYDROCHLORIDE 240 MG/1
240 CAPSULE, COATED, EXTENDED RELEASE ORAL
Status: DISCONTINUED | OUTPATIENT
Start: 2019-01-01 | End: 2019-01-01 | Stop reason: HOSPADM

## 2019-01-01 RX ORDER — PREDNISONE 20 MG/1
20 TABLET ORAL DAILY
Status: DISCONTINUED | OUTPATIENT
Start: 2019-01-01 | End: 2019-01-01

## 2019-01-01 RX ORDER — FUROSEMIDE 10 MG/ML
40 INJECTION INTRAMUSCULAR; INTRAVENOUS
Status: DISCONTINUED | OUTPATIENT
Start: 2019-01-01 | End: 2019-01-01

## 2019-01-01 RX ORDER — AZITHROMYCIN 250 MG/1
500 TABLET, FILM COATED ORAL DAILY
Status: COMPLETED | OUTPATIENT
Start: 2019-01-01 | End: 2019-01-01

## 2019-01-01 RX ORDER — ENEMA 19; 7 G/133ML; G/133ML
1 ENEMA RECTAL
Status: DISCONTINUED | OUTPATIENT
Start: 2019-01-01 | End: 2019-01-01 | Stop reason: HOSPADM

## 2019-01-01 RX ORDER — BACITRACIN ZINC AND POLYMYXIN B SULFATE 500; 1000 [USP'U]/G; [USP'U]/G
OINTMENT TOPICAL 2 TIMES DAILY
Status: DISCONTINUED | OUTPATIENT
Start: 2019-01-01 | End: 2019-01-01 | Stop reason: HOSPADM

## 2019-01-01 RX ORDER — POTASSIUM CHLORIDE 20 MEQ/1
20 TABLET, EXTENDED RELEASE ORAL DAILY
Qty: 14 TAB | Refills: 0 | Status: SHIPPED | OUTPATIENT
Start: 2019-01-01 | End: 2019-01-01 | Stop reason: SDUPTHER

## 2019-01-01 RX ORDER — WARFARIN SODIUM 3 MG/1
3 TABLET ORAL DAILY
Status: DISCONTINUED | OUTPATIENT
Start: 2019-01-01 | End: 2019-01-01

## 2019-01-01 RX ORDER — BISACODYL 10 MG
10 SUPPOSITORY, RECTAL RECTAL
Status: DISCONTINUED | OUTPATIENT
Start: 2019-01-01 | End: 2019-01-01 | Stop reason: HOSPADM

## 2019-01-01 RX ORDER — DILTIAZEM HYDROCHLORIDE 240 MG/1
240 CAPSULE, COATED, EXTENDED RELEASE ORAL
Qty: 90 CAP | Refills: 3 | Status: SHIPPED | OUTPATIENT
Start: 2019-01-01

## 2019-01-01 RX ORDER — ONDANSETRON 2 MG/ML
4 INJECTION INTRAMUSCULAR; INTRAVENOUS EVERY 4 HOURS PRN
Status: DISCONTINUED | OUTPATIENT
Start: 2019-01-01 | End: 2019-01-01 | Stop reason: HOSPADM

## 2019-01-01 RX ORDER — LOSARTAN POTASSIUM 25 MG/1
25 TABLET ORAL
Status: DISCONTINUED | OUTPATIENT
Start: 2019-01-01 | End: 2019-01-01

## 2019-01-01 RX ORDER — SULFAMETHOXAZOLE AND TRIMETHOPRIM 800; 160 MG/1; MG/1
1 TABLET ORAL 2 TIMES DAILY
Qty: 14 TAB | Refills: 0 | Status: SHIPPED | OUTPATIENT
Start: 2019-01-01 | End: 2019-01-01

## 2019-01-01 RX ORDER — PREDNISONE 20 MG/1
40 TABLET ORAL DAILY
Status: DISCONTINUED | OUTPATIENT
Start: 2019-01-01 | End: 2019-01-01

## 2019-01-01 RX ORDER — FUROSEMIDE 10 MG/ML
20 INJECTION INTRAMUSCULAR; INTRAVENOUS
Status: DISCONTINUED | OUTPATIENT
Start: 2019-01-01 | End: 2019-01-01

## 2019-01-01 RX ORDER — ACETAMINOPHEN 325 MG/1
650 TABLET ORAL EVERY 6 HOURS PRN
Status: DISCONTINUED | OUTPATIENT
Start: 2019-01-01 | End: 2019-01-01 | Stop reason: HOSPADM

## 2019-01-01 RX ORDER — DILTIAZEM HYDROCHLORIDE 120 MG/1
120 CAPSULE, COATED, EXTENDED RELEASE ORAL
Status: DISCONTINUED | OUTPATIENT
Start: 2019-01-01 | End: 2019-01-01 | Stop reason: HOSPADM

## 2019-01-01 RX ORDER — SODIUM CHLORIDE, SODIUM LACTATE, POTASSIUM CHLORIDE, CALCIUM CHLORIDE 600; 310; 30; 20 MG/100ML; MG/100ML; MG/100ML; MG/100ML
INJECTION, SOLUTION INTRAVENOUS CONTINUOUS
Status: DISCONTINUED | OUTPATIENT
Start: 2019-01-01 | End: 2019-01-01

## 2019-01-01 RX ORDER — POLYETHYLENE GLYCOL 3350 17 G/17G
1 POWDER, FOR SOLUTION ORAL
Status: DISCONTINUED | OUTPATIENT
Start: 2019-01-01 | End: 2019-01-01 | Stop reason: HOSPADM

## 2019-01-01 RX ORDER — OXYCODONE HYDROCHLORIDE 5 MG/1
5 TABLET ORAL
Status: DISCONTINUED | OUTPATIENT
Start: 2019-01-01 | End: 2019-01-01

## 2019-01-01 RX ORDER — AMOXICILLIN 250 MG
1 CAPSULE ORAL NIGHTLY
Status: DISCONTINUED | OUTPATIENT
Start: 2019-01-01 | End: 2019-01-01 | Stop reason: HOSPADM

## 2019-01-01 RX ORDER — LOSARTAN POTASSIUM 50 MG/1
25 TABLET ORAL
Status: DISCONTINUED | OUTPATIENT
Start: 2019-01-01 | End: 2019-01-01 | Stop reason: HOSPADM

## 2019-01-01 RX ORDER — FUROSEMIDE 20 MG/1
20 TABLET ORAL DAILY
Qty: 14 TAB | Refills: 0 | Status: SHIPPED | OUTPATIENT
Start: 2019-01-01 | End: 2019-01-01 | Stop reason: SDUPTHER

## 2019-01-01 RX ORDER — DILTIAZEM HYDROCHLORIDE 240 MG/1
CAPSULE, COATED, EXTENDED RELEASE ORAL
Qty: 90 CAP | Refills: 2 | Status: ON HOLD | OUTPATIENT
Start: 2019-01-01 | End: 2019-01-01

## 2019-01-01 RX ORDER — LIDOCAINE HYDROCHLORIDE 10 MG/ML
20 INJECTION, SOLUTION INFILTRATION; PERINEURAL ONCE
Status: COMPLETED | OUTPATIENT
Start: 2019-01-01 | End: 2019-01-01

## 2019-01-01 RX ORDER — HEPARIN SODIUM 5000 [USP'U]/ML
5000 INJECTION, SOLUTION INTRAVENOUS; SUBCUTANEOUS EVERY 8 HOURS
Status: DISCONTINUED | OUTPATIENT
Start: 2019-01-01 | End: 2019-01-01

## 2019-01-01 RX ORDER — FUROSEMIDE 20 MG/1
20 TABLET ORAL DAILY
Qty: 30 TAB | Refills: 0 | Status: SHIPPED | OUTPATIENT
Start: 2019-01-01 | End: 2019-01-01 | Stop reason: SDUPTHER

## 2019-01-01 RX ORDER — METHYLPREDNISOLONE SODIUM SUCCINATE 40 MG/ML
40 INJECTION, POWDER, LYOPHILIZED, FOR SOLUTION INTRAMUSCULAR; INTRAVENOUS ONCE
Status: COMPLETED | OUTPATIENT
Start: 2019-01-01 | End: 2019-01-01

## 2019-01-01 RX ORDER — ACETAMINOPHEN 650 MG/1
650 SUPPOSITORY RECTAL EVERY 4 HOURS PRN
Status: DISCONTINUED | OUTPATIENT
Start: 2019-01-01 | End: 2019-01-01 | Stop reason: HOSPADM

## 2019-01-01 RX ORDER — IPRATROPIUM BROMIDE AND ALBUTEROL SULFATE 2.5; .5 MG/3ML; MG/3ML
3 SOLUTION RESPIRATORY (INHALATION)
Status: DISCONTINUED | OUTPATIENT
Start: 2019-01-01 | End: 2019-01-01

## 2019-01-01 RX ORDER — LIDOCAINE HYDROCHLORIDE AND EPINEPHRINE BITARTRATE 20; .01 MG/ML; MG/ML
10 INJECTION, SOLUTION SUBCUTANEOUS ONCE
Status: COMPLETED | OUTPATIENT
Start: 2019-01-01 | End: 2019-01-01

## 2019-01-01 RX ORDER — POTASSIUM CHLORIDE 20 MEQ/1
20 TABLET, EXTENDED RELEASE ORAL DAILY
Qty: 30 TAB | Refills: 0 | Status: SHIPPED | OUTPATIENT
Start: 2019-01-01 | End: 2019-01-01 | Stop reason: SDUPTHER

## 2019-01-01 RX ADMIN — FUROSEMIDE 40 MG: 10 INJECTION, SOLUTION INTRAMUSCULAR; INTRAVENOUS at 17:47

## 2019-01-01 RX ADMIN — DILTIAZEM HYDROCHLORIDE 240 MG: 120 CAPSULE, COATED, EXTENDED RELEASE ORAL at 05:13

## 2019-01-01 RX ADMIN — MAGNESIUM OXIDE TAB 400 MG (241.3 MG ELEMENTAL MG) 400 MG: 400 (241.3 MG) TAB at 04:43

## 2019-01-01 RX ADMIN — IPRATROPIUM BROMIDE AND ALBUTEROL SULFATE 3 ML: .5; 3 SOLUTION RESPIRATORY (INHALATION) at 18:10

## 2019-01-01 RX ADMIN — CALCIUM CITRATE 200 MG (950 MG) TABLET 950 MG: at 04:53

## 2019-01-01 RX ADMIN — DILTIAZEM HYDROCHLORIDE 240 MG: 240 CAPSULE, COATED, EXTENDED RELEASE ORAL at 04:51

## 2019-01-01 RX ADMIN — SENNOSIDES, DOCUSATE SODIUM 2 TABLET: 50; 8.6 TABLET, FILM COATED ORAL at 17:47

## 2019-01-01 RX ADMIN — Medication 1 EACH: at 04:42

## 2019-01-01 RX ADMIN — ENOXAPARIN SODIUM 30 MG: 100 INJECTION SUBCUTANEOUS at 11:37

## 2019-01-01 RX ADMIN — LOSARTAN POTASSIUM 25 MG: 25 TABLET ORAL at 05:10

## 2019-01-01 RX ADMIN — LOSARTAN POTASSIUM 25 MG: 25 TABLET ORAL at 05:14

## 2019-01-01 RX ADMIN — FUROSEMIDE 40 MG: 10 INJECTION, SOLUTION INTRAMUSCULAR; INTRAVENOUS at 05:13

## 2019-01-01 RX ADMIN — FUROSEMIDE 40 MG: 10 INJECTION, SOLUTION INTRAMUSCULAR; INTRAVENOUS at 04:11

## 2019-01-01 RX ADMIN — Medication 1 EACH: at 11:37

## 2019-01-01 RX ADMIN — FUROSEMIDE 20 MG: 10 INJECTION, SOLUTION INTRAMUSCULAR; INTRAVENOUS at 17:49

## 2019-01-01 RX ADMIN — FUROSEMIDE 40 MG: 10 INJECTION, SOLUTION INTRAMUSCULAR; INTRAVENOUS at 17:29

## 2019-01-01 RX ADMIN — Medication 1 EACH: at 18:21

## 2019-01-01 RX ADMIN — MAGNESIUM OXIDE TAB 400 MG (241.3 MG ELEMENTAL MG) 400 MG: 400 (241.3 MG) TAB at 05:32

## 2019-01-01 RX ADMIN — Medication 1 EACH: at 22:20

## 2019-01-01 RX ADMIN — DILTIAZEM HYDROCHLORIDE 240 MG: 120 CAPSULE, COATED, EXTENDED RELEASE ORAL at 04:11

## 2019-01-01 RX ADMIN — IPRATROPIUM BROMIDE AND ALBUTEROL SULFATE 3 ML: .5; 3 SOLUTION RESPIRATORY (INHALATION) at 02:30

## 2019-01-01 RX ADMIN — ENOXAPARIN SODIUM 30 MG: 100 INJECTION SUBCUTANEOUS at 04:42

## 2019-01-01 RX ADMIN — IPRATROPIUM BROMIDE AND ALBUTEROL SULFATE 3 ML: .5; 3 SOLUTION RESPIRATORY (INHALATION) at 14:37

## 2019-01-01 RX ADMIN — Medication 1 EACH: at 05:31

## 2019-01-01 RX ADMIN — FUROSEMIDE 40 MG: 10 INJECTION, SOLUTION INTRAMUSCULAR; INTRAVENOUS at 05:10

## 2019-01-01 RX ADMIN — LOSARTAN POTASSIUM 25 MG: 25 TABLET ORAL at 04:43

## 2019-01-01 RX ADMIN — PHYTONADIONE 10 MG: 10 INJECTION, EMULSION INTRAMUSCULAR; INTRAVENOUS; SUBCUTANEOUS at 21:13

## 2019-01-01 RX ADMIN — IPRATROPIUM BROMIDE AND ALBUTEROL SULFATE 3 ML: .5; 3 SOLUTION RESPIRATORY (INHALATION) at 05:17

## 2019-01-01 RX ADMIN — IPRATROPIUM BROMIDE AND ALBUTEROL SULFATE 3 ML: .5; 3 SOLUTION RESPIRATORY (INHALATION) at 11:33

## 2019-01-01 RX ADMIN — AMPICILLIN AND SULBACTAM 3 G: 2; 1 INJECTION, POWDER, FOR SOLUTION INTRAVENOUS at 22:43

## 2019-01-01 RX ADMIN — AZITHROMYCIN 500 MG: 250 TABLET, FILM COATED ORAL at 00:09

## 2019-01-01 RX ADMIN — MAGNESIUM HYDROXIDE 30 ML: 400 SUSPENSION ORAL at 04:42

## 2019-01-01 RX ADMIN — SENNOSIDES, DOCUSATE SODIUM 2 TABLET: 50; 8.6 TABLET, FILM COATED ORAL at 05:06

## 2019-01-01 RX ADMIN — PREDNISONE 40 MG: 20 TABLET ORAL at 05:14

## 2019-01-01 RX ADMIN — WARFARIN SODIUM 3 MG: 5 TABLET ORAL at 17:51

## 2019-01-01 RX ADMIN — SODIUM CHLORIDE, POTASSIUM CHLORIDE, SODIUM LACTATE AND CALCIUM CHLORIDE: 600; 310; 30; 20 INJECTION, SOLUTION INTRAVENOUS at 21:09

## 2019-01-01 RX ADMIN — PREDNISONE 40 MG: 20 TABLET ORAL at 05:07

## 2019-01-01 RX ADMIN — SODIUM CHLORIDE 500 MG: 9 INJECTION, SOLUTION INTRAVENOUS at 21:46

## 2019-01-01 RX ADMIN — SENNOSIDES, DOCUSATE SODIUM 2 TABLET: 50; 8.6 TABLET, FILM COATED ORAL at 17:29

## 2019-01-01 RX ADMIN — LIDOCAINE HYDROCHLORIDE 20 ML: 10 INJECTION, SOLUTION INFILTRATION; PERINEURAL at 19:00

## 2019-01-01 RX ADMIN — LOSARTAN POTASSIUM 25 MG: 25 TABLET ORAL at 04:11

## 2019-01-01 RX ADMIN — VITAMIN D, TAB 1000IU (100/BT) 2000 UNITS: 25 TAB at 04:42

## 2019-01-01 RX ADMIN — AZITHROMYCIN 500 MG: 250 TABLET, FILM COATED ORAL at 17:28

## 2019-01-01 RX ADMIN — DILTIAZEM HYDROCHLORIDE 240 MG: 240 CAPSULE, COATED, EXTENDED RELEASE ORAL at 05:32

## 2019-01-01 RX ADMIN — AZITHROMYCIN 500 MG: 250 TABLET, FILM COATED ORAL at 17:47

## 2019-01-01 RX ADMIN — PROTHROMBIN, COAGULATION FACTOR VII HUMAN, COAGULATION FACTOR IX HUMAN, COAGULATION FACTOR X HUMAN, PROTEIN C, PROTEIN S HUMAN, AND WATER 2093 UNITS: KIT at 21:38

## 2019-01-01 RX ADMIN — LIDOCAINE HYDROCHLORIDE AND EPINEPHRINE 10 ML: 20; 10 INJECTION, SOLUTION INFILTRATION; PERINEURAL at 19:00

## 2019-01-01 RX ADMIN — SENNOSIDES, DOCUSATE SODIUM 2 TABLET: 50; 8.6 TABLET, FILM COATED ORAL at 05:14

## 2019-01-01 RX ADMIN — LOSARTAN POTASSIUM 25 MG: 25 TABLET ORAL at 05:32

## 2019-01-01 RX ADMIN — LOSARTAN POTASSIUM 25 MG: 25 TABLET ORAL at 10:52

## 2019-01-01 RX ADMIN — METHYLPREDNISOLONE SODIUM SUCCINATE 40 MG: 40 INJECTION, POWDER, FOR SOLUTION INTRAMUSCULAR; INTRAVENOUS at 00:09

## 2019-01-01 RX ADMIN — FUROSEMIDE 20 MG: 10 INJECTION, SOLUTION INTRAMUSCULAR; INTRAVENOUS at 11:00

## 2019-01-01 RX ADMIN — CALCIUM CITRATE 200 MG (950 MG) TABLET 950 MG: at 05:32

## 2019-01-01 RX ADMIN — IPRATROPIUM BROMIDE AND ALBUTEROL SULFATE 3 ML: .5; 3 SOLUTION RESPIRATORY (INHALATION) at 07:34

## 2019-01-01 RX ADMIN — DOCUSATE SODIUM 100 MG: 100 CAPSULE, LIQUID FILLED ORAL at 04:42

## 2019-01-01 RX ADMIN — VITAMIN D, TAB 1000IU (100/BT) 2000 UNITS: 25 TAB at 05:31

## 2019-01-01 RX ADMIN — POLYETHYLENE GLYCOL 3350 1 PACKET: 17 POWDER, FOR SOLUTION ORAL at 04:42

## 2019-01-01 RX ADMIN — PREDNISONE 40 MG: 20 TABLET ORAL at 10:51

## 2019-01-01 RX ADMIN — PREDNISONE 40 MG: 20 TABLET ORAL at 04:11

## 2019-01-01 SDOH — ECONOMIC STABILITY: HOUSING INSECURITY

## 2019-01-01 ASSESSMENT — COGNITIVE AND FUNCTIONAL STATUS - GENERAL
HELP NEEDED FOR BATHING: A LITTLE
SUGGESTED CMS G CODE MODIFIER MOBILITY: CK
MOVING FROM LYING ON BACK TO SITTING ON SIDE OF FLAT BED: UNABLE
MOBILITY SCORE: 24
DRESSING REGULAR UPPER BODY CLOTHING: A LITTLE
STANDING UP FROM CHAIR USING ARMS: A LITTLE
CLIMB 3 TO 5 STEPS WITH RAILING: A LITTLE
STANDING UP FROM CHAIR USING ARMS: A LITTLE
TOILETING: A LOT
MOBILITY SCORE: 16
DAILY ACTIVITIY SCORE: 15
DAILY ACTIVITIY SCORE: 19
SUGGESTED CMS G CODE MODIFIER MOBILITY: CJ
WALKING IN HOSPITAL ROOM: A LITTLE
MOBILITY SCORE: 18
SUGGESTED CMS G CODE MODIFIER DAILY ACTIVITY: CH
SUGGESTED CMS G CODE MODIFIER DAILY ACTIVITY: CK
TOILETING: A LITTLE
DAILY ACTIVITIY SCORE: 24
SUGGESTED CMS G CODE MODIFIER MOBILITY: CK
SUGGESTED CMS G CODE MODIFIER DAILY ACTIVITY: CJ
CLIMB 3 TO 5 STEPS WITH RAILING: A LITTLE
HELP NEEDED FOR BATHING: A LOT
DRESSING REGULAR LOWER BODY CLOTHING: A LITTLE
HELP NEEDED FOR BATHING: A LITTLE
MOVING TO AND FROM BED TO CHAIR: A LITTLE
SUGGESTED CMS G CODE MODIFIER MOBILITY: CH
WALKING IN HOSPITAL ROOM: A LITTLE
TURNING FROM BACK TO SIDE WHILE IN FLAT BAD: A LITTLE
MOBILITY SCORE: 20
DAILY ACTIVITIY SCORE: 21
STANDING UP FROM CHAIR USING ARMS: A LITTLE
WALKING IN HOSPITAL ROOM: A LITTLE
DRESSING REGULAR LOWER BODY CLOTHING: A LOT
PERSONAL GROOMING: A LITTLE
CLIMB 3 TO 5 STEPS WITH RAILING: A LITTLE
SUGGESTED CMS G CODE MODIFIER MOBILITY: CJ
CLIMB 3 TO 5 STEPS WITH RAILING: A LITTLE
MOVING FROM LYING ON BACK TO SITTING ON SIDE OF FLAT BED: A LITTLE
HELP NEEDED FOR BATHING: A LITTLE
TOILETING: A LITTLE
WALKING IN HOSPITAL ROOM: A LITTLE
CLIMB 3 TO 5 STEPS WITH RAILING: A LITTLE
TURNING FROM BACK TO SIDE WHILE IN FLAT BAD: A LITTLE
MOVING TO AND FROM BED TO CHAIR: A LITTLE
MOBILITY SCORE: 20
CLIMB 3 TO 5 STEPS WITH RAILING: A LITTLE
MOBILITY SCORE: 18
STANDING UP FROM CHAIR USING ARMS: A LITTLE
WALKING IN HOSPITAL ROOM: A LITTLE
SUGGESTED CMS G CODE MODIFIER DAILY ACTIVITY: CK
DRESSING REGULAR LOWER BODY CLOTHING: A LITTLE
MOBILITY SCORE: 20
PERSONAL GROOMING: A LITTLE
SUGGESTED CMS G CODE MODIFIER MOBILITY: CK
TOILETING: A LITTLE
WALKING IN HOSPITAL ROOM: A LITTLE
STANDING UP FROM CHAIR USING ARMS: A LITTLE
SUGGESTED CMS G CODE MODIFIER DAILY ACTIVITY: CH
DRESSING REGULAR UPPER BODY CLOTHING: A LITTLE
SUGGESTED CMS G CODE MODIFIER DAILY ACTIVITY: CJ
TURNING FROM BACK TO SIDE WHILE IN FLAT BAD: A LITTLE
MOVING TO AND FROM BED TO CHAIR: A LITTLE
DAILY ACTIVITIY SCORE: 21
MOVING TO AND FROM BED TO CHAIR: UNABLE
DRESSING REGULAR LOWER BODY CLOTHING: A LITTLE
DAILY ACTIVITIY SCORE: 24
MOVING TO AND FROM BED TO CHAIR: A LITTLE
SUGGESTED CMS G CODE MODIFIER MOBILITY: CJ
STANDING UP FROM CHAIR USING ARMS: A LITTLE
EATING MEALS: A LITTLE

## 2019-01-01 ASSESSMENT — ENCOUNTER SYMPTOMS
BRUISES/BLEEDS EASILY: 0
PALPITATIONS: 0
CARDIOVASCULAR NEGATIVE: 1
DOUBLE VISION: 1
MEMORY LOSS: 1
PALPITATIONS: 0
MYALGIAS: 0
FOCAL WEAKNESS: 0
NECK PAIN: 0
NAUSEA: DENIES
DIARRHEA: 0
FEVER: 0
BRUISES/BLEEDS EASILY: 0
VOMITING: 0
VOMITING: DENIES
FOCAL WEAKNESS: 0
SPUTUM PRODUCTION: 1
MUSCULOSKELETAL NEGATIVE: 1
PSYCHIATRIC NEGATIVE: 1
COUGH: 1
EYES NEGATIVE: 1
SHORTNESS OF BREATH: 0
DIZZINESS: 0
DIAPHORESIS: 0
HEADACHES: 0
NAUSEA: DENIES
BLURRED VISION: 0
NECK PAIN: 0
NAUSEA: DENIES
FEVER: 0
PSYCHIATRIC NEGATIVE: 1
RESPIRATORY NEGATIVE: 1
ABDOMINAL PAIN: 0
DEPRESSION: 0
FEVER: 0
SINUS PAIN: 0
VOMITING: DENIES
PALPITATIONS: 0
TINGLING: 0
PND: 0
EYES NEGATIVE: 1
SORE THROAT: 0
ABDOMINAL PAIN: 0
CLAUDICATION: 0
NAUSEA: 0
NAUSEA: 0
NECK PAIN: 0
VOMITING: 0
MYALGIAS: 0
CHILLS: 0
NAUSEA: 0
RESPIRATORY NEGATIVE: 1
SPUTUM PRODUCTION: 0
SPUTUM PRODUCTION: 1
PALPITATIONS: 0
SHORTNESS OF BREATH: 0
COUGH: 1
MYALGIAS: 0
COUGH: 0
NEUROLOGICAL NEGATIVE: 1
HEARTBURN: 0
BACK PAIN: 0
EYES NEGATIVE: 1
MUSCULOSKELETAL NEGATIVE: 1
COUGH: 1
DIZZINESS: 0
HEADACHES: 0
DIZZINESS: 0
FALLS: 0
DIZZINESS: 0
DIZZINESS: 0
NAUSEA: 0
DOUBLE VISION: 1
WHEEZING: 1
SORE THROAT: 0
PALPITATIONS: 0
FEVER: 0
SHORTNESS OF BREATH: 1
DOUBLE VISION: 0
ABDOMINAL PAIN: 0
BACK PAIN: 0
CHILLS: 0
CHILLS: 0
VOMITING: 0
MUSCULOSKELETAL NEGATIVE: 1
MYALGIAS: 0
CARDIOVASCULAR NEGATIVE: 1
HEADACHES: 0
NECK PAIN: 0
SHORTNESS OF BREATH: 1
SHORTNESS OF BREATH: 0
BRUISES/BLEEDS EASILY: 0
FEVER: 0
SORE THROAT: 0
WEAKNESS: 0
DIARRHEA: 0
NAUSEA: 0
BLOOD IN STOOL: 0
CARDIOVASCULAR NEGATIVE: 1
PSYCHIATRIC NEGATIVE: 1
ABDOMINAL PAIN: 0
CLAUDICATION: 0
SHORTNESS OF BREATH: 0
VOMITING: 0
SHORTNESS OF BREATH: 0
SHORTNESS OF BREATH: 0
SPUTUM PRODUCTION: 1
CHILLS: 0
NAUSEA: DENIES
NEUROLOGICAL NEGATIVE: 1
CHILLS: 0
HEADACHES: 0
BLURRED VISION: 0
BLURRED VISION: 0
BACK PAIN: 0
FEVER: 0
MUSCULOSKELETAL NEGATIVE: 1
NAUSEA: 0
SEIZURES: 0
COUGH: 0
DIZZINESS: 0
HEADACHES: 0
FOCAL WEAKNESS: 0
ORTHOPNEA: 0
NAIL CHANGES: 0
FLANK PAIN: 0
VOMITING: 0
SHORTNESS OF BREATH: 1
WHEEZING: 1
BLURRED VISION: 0
SPEECH CHANGE: 0
VOMITING: 0
CHILLS: 0
ROS GI COMMENTS: BM PRIOR TO ARRIVAL
FEVER: 0
BLURRED VISION: 0
CHILLS: 0
FEVER: 0
NAUSEA: 0
FOCAL WEAKNESS: 0
WHEEZING: 1
SPEECH CHANGE: 0
VOMITING: DENIES
NEUROLOGICAL NEGATIVE: 1
VOMITING: DENIES
ABDOMINAL PAIN: 0

## 2019-01-01 ASSESSMENT — GAIT ASSESSMENTS
SURVEY COMPLETE: TRUE
TIME: 0
DISTANCE (FEET): 250
ASSISTIVE DEVICE: NONE;FRONT WHEEL WALKER
GAIT LEVEL OF ASSIST: MINIMAL ASSIST
GAIT LEVEL OF ASSIST: MINIMAL ASSIST
STEP SYMMETRY: 1
RIGHT STEP CLEAR: 1
DISTANCE (FEET): 350
GAIT LEVEL OF ASSIST: MINIMAL ASSIST
DISTANCE (FEET): 150
DISTANCE (FEET): 150
INITIATION OF GAIT IMMEDIATELY AFTER GO: 1
STEP CONTINUITY: 1
TRUNK: 1
DEVIATION: OTHER (COMMENT)
GAIT LEVEL OF ASSIST: MINIMAL ASSIST
DISTANCE (FEET): 150
BALANCE AND GAIT SCORE: 19
PATH: 1
GAIT LEVEL OF ASSIST: MINIMAL ASSIST
DEVIATION: OTHER (COMMENT)
GAIT SCORE: 9
LEFT STEP CLEAR: 1
RIGHT STEP PASS: 1
LEFT STEP PASS: 1
ASSISTIVE DEVICE: HAND HELD ASSIST
DEVIATION: INCREASED BASE OF SUPPORT;BRADYKINETIC;SHUFFLED GAIT

## 2019-01-01 ASSESSMENT — BALANCE ASSESSMENTS
BALANCE SCORE: 10
STANDING BALANCE: 1
IMMEDIATE STANDING BALANCE FIRST 5 SECONDS: 2
SITTING DOWN: 1
NUDGED: 2
TURNING 360 DEGREES STEPS: 0
SURVEY COMPLETE: TRUE
SITTING BALANCE: 1
TURNING 360 DEGREES STEADINESS: 0
ARISES: 1
ATTEMPTS TO ARISE: 2
EYES CLOSED AT MAXIMUM POSITION NUDGED: 0

## 2019-01-01 ASSESSMENT — LIFESTYLE VARIABLES
AVERAGE NUMBER OF DAYS PER WEEK YOU HAVE A DRINK CONTAINING ALCOHOL: 0
TOTAL SCORE: 0
EVER FELT BAD OR GUILTY ABOUT YOUR DRINKING: NO
DOES PATIENT WANT TO STOP DRINKING: NO
TOTAL SCORE: 0
SUBSTANCE_ABUSE: 0
EVER_SMOKED: NEVER
EVER HAD A DRINK FIRST THING IN THE MORNING TO STEADY YOUR NERVES TO GET RID OF A HANGOVER: NO
HAVE YOU EVER FELT YOU SHOULD CUT DOWN ON YOUR DRINKING: NO
SUBSTANCE_ABUSE: 0
ALCOHOL_USE: NO
ALCOHOL_USE: NO
EVER_SMOKED: NEVER
TOTAL SCORE: 0
HAVE PEOPLE ANNOYED YOU BY CRITICIZING YOUR DRINKING: NO
EVER_SMOKED: NEVER
CONSUMPTION TOTAL: NEGATIVE
HOW MANY TIMES IN THE PAST YEAR HAVE YOU HAD 5 OR MORE DRINKS IN A DAY: 0
ON A TYPICAL DAY WHEN YOU DRINK ALCOHOL HOW MANY DRINKS DO YOU HAVE: 0

## 2019-01-01 ASSESSMENT — PATIENT HEALTH QUESTIONNAIRE - PHQ9
1. LITTLE INTEREST OR PLEASURE IN DOING THINGS: NOT AT ALL
SUM OF ALL RESPONSES TO PHQ9 QUESTIONS 1 AND 2: 0
SUM OF ALL RESPONSES TO PHQ9 QUESTIONS 1 AND 2: 0
CLINICAL INTERPRETATION OF PHQ2 SCORE: 0
CLINICAL INTERPRETATION OF PHQ2 SCORE: 0
2. FEELING DOWN, DEPRESSED, IRRITABLE, OR HOPELESS: NOT AT ALL
2. FEELING DOWN, DEPRESSED, IRRITABLE, OR HOPELESS: NOT AT ALL
2. FEELING DOWN, DEPRESSED, IRRITABLE, OR HOPELESS: 00
1. LITTLE INTEREST OR PLEASURE IN DOING THINGS: NOT AT ALL
SUM OF ALL RESPONSES TO PHQ9 QUESTIONS 1 AND 2: 0
1. LITTLE INTEREST OR PLEASURE IN DOING THINGS: 00
2. FEELING DOWN, DEPRESSED, IRRITABLE, OR HOPELESS: NOT AT ALL
1. LITTLE INTEREST OR PLEASURE IN DOING THINGS: NOT AT ALL

## 2019-01-01 ASSESSMENT — CHA2DS2 SCORE
SEX: MALE
CHA2DS2 VASC SCORE: 6
PRIOR STROKE OR TIA OR THROMBOEMBOLISM: YES
AGE 65 TO 74: NO
DIABETES: NO
AGE 75 OR GREATER: YES
CHF OR LEFT VENTRICULAR DYSFUNCTION: YES
VASCULAR DISEASE: NO
HYPERTENSION: YES

## 2019-01-01 ASSESSMENT — ACTIVITIES OF DAILY LIVING (ADL)
TOILETING: INDEPENDENT
OASIS_M1830: 03
ADLS_COMMENTS: <!--EPICS-->REFER TO OT EVALUATION<!--EPICE-->
TOILETING: INDEPENDENT
HOME_HEALTH_OASIS: 00
OASIS_M1830: 01
IADLS_COMMENTS: <!--EPICS-->REFER TO OT EVALUATION<!--EPICE-->

## 2019-01-01 ASSESSMENT — COPD QUESTIONNAIRES
HAVE YOU SMOKED AT LEAST 100 CIGARETTES IN YOUR ENTIRE LIFE: NO/DON'T KNOW
COPD SCREENING SCORE: 3
DO YOU EVER COUGH UP ANY MUCUS OR PHLEGM?: YES, A FEW DAYS A WEEK OR MONTH
DURING THE PAST 4 WEEKS HOW MUCH DID YOU FEEL SHORT OF BREATH: NONE/LITTLE OF THE TIME
HAVE YOU SMOKED AT LEAST 100 CIGARETTES IN YOUR ENTIRE LIFE: NO/DON'T KNOW
COPD SCREENING SCORE: 3
DO YOU EVER COUGH UP ANY MUCUS OR PHLEGM?: NO/ONLY WITH OCCASIONAL COLDS OR INFECTIONS
DURING THE PAST 4 WEEKS HOW MUCH DID YOU FEEL SHORT OF BREATH: SOME OF THE TIME
COPD: 0

## 2019-01-01 ASSESSMENT — PAIN SCALES - GENERAL
PAINLEVEL: NO PAIN
PAINLEVEL: NO PAIN

## 2019-01-23 ENCOUNTER — NON-PROVIDER VISIT (OUTPATIENT)
Dept: CARDIOLOGY | Facility: MEDICAL CENTER | Age: 84
End: 2019-01-23
Payer: MEDICARE

## 2019-01-23 ENCOUNTER — OFFICE VISIT (OUTPATIENT)
Dept: CARDIOLOGY | Facility: MEDICAL CENTER | Age: 84
End: 2019-01-23
Payer: MEDICARE

## 2019-01-23 VITALS
BODY MASS INDEX: 28.68 KG/M2 | WEIGHT: 168 LBS | OXYGEN SATURATION: 92 % | HEIGHT: 64 IN | HEART RATE: 82 BPM | SYSTOLIC BLOOD PRESSURE: 110 MMHG | DIASTOLIC BLOOD PRESSURE: 64 MMHG

## 2019-01-23 DIAGNOSIS — Z86.73 HISTORY OF TIA (TRANSIENT ISCHEMIC ATTACK) AND STROKE: ICD-10-CM

## 2019-01-23 DIAGNOSIS — Z95.0 PRESENCE OF PERMANENT CARDIAC PACEMAKER: ICD-10-CM

## 2019-01-23 DIAGNOSIS — I10 ESSENTIAL HYPERTENSION: ICD-10-CM

## 2019-01-23 DIAGNOSIS — I44.2 AV BLOCK, 3RD DEGREE (HCC): ICD-10-CM

## 2019-01-23 DIAGNOSIS — Z79.01 LONG TERM CURRENT USE OF ANTICOAGULANT THERAPY: ICD-10-CM

## 2019-01-23 DIAGNOSIS — I48.0 PAROXYSMAL ATRIAL FIBRILLATION (HCC): ICD-10-CM

## 2019-01-23 DIAGNOSIS — I35.0 MODERATE AORTIC STENOSIS: Chronic | ICD-10-CM

## 2019-01-23 PROBLEM — I48.91 ATRIAL FIBRILLATION (HCC): Status: RESOLVED | Noted: 2018-09-21 | Resolved: 2019-01-23

## 2019-01-23 PROCEDURE — 93280 PM DEVICE PROGR EVAL DUAL: CPT | Performed by: INTERNAL MEDICINE

## 2019-01-23 PROCEDURE — 99214 OFFICE O/P EST MOD 30 MIN: CPT | Performed by: INTERNAL MEDICINE

## 2019-01-23 RX ORDER — AMOXICILLIN AND CLAVULANATE POTASSIUM 875; 125 MG/1; MG/1
TABLET, FILM COATED ORAL
Refills: 0 | COMMUNITY
Start: 2018-11-30 | End: 2019-01-01

## 2019-01-23 ASSESSMENT — ENCOUNTER SYMPTOMS
BLURRED VISION: 0
FEVER: 0
NAUSEA: 0
CHILLS: 0
FOCAL WEAKNESS: 0
SHORTNESS OF BREATH: 0
WEAKNESS: 0
COUGH: 0
CLAUDICATION: 0
DIZZINESS: 0
BRUISES/BLEEDS EASILY: 0
ABDOMINAL PAIN: 0
FALLS: 0
SORE THROAT: 0
PND: 0
PALPITATIONS: 0

## 2019-01-24 NOTE — PROGRESS NOTES
Chief Complaint   Patient presents with   • Hypertension     Follow up       Subjective:   Primitivo Milan is a 89 y.o. 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 Lexa Lopes.    • Renal disorder 1970    stones   • Sleep apnea     Cannot use CPAP machine.   • Stroke (HCC) 1991   • TIA (transient ischemic attack)      Past Surgical History:   Procedure Laterality Date   • RECOVERY  5/19/2011    Performed by SURGERY, IR-RECOVERY at SURGERY SAME DAY [facility] ORS   • PACEMAKER INSERTION  September 2009    Medtronic Versa [id number] implanted by Dr. Lexa Lopes.   • EYE SURGERY  2006    left eye mass/ cancer/ s/p radiation   • LITHOTRIPSY  1995    left kidney x 3   • PROSTATECTOMY, RADICAL RETRO  1991   • EYE SURGERY       Family History   Problem Relation Age of Onset   • Heart Disease Mother    • Heart Attack Mother    • Heart Disease Father    • Heart Attack Father      Social History     Social History   • Marital status:      Spouse  "name: N/A   • Number of children: N/A   • Years of education: N/A     Occupational History   • Not on file.     Social History Main Topics   • Smoking status: Never Smoker   • Smokeless tobacco: Never Used   • Alcohol use No   • Drug use: No   • Sexual activity: No     Other Topics Concern   • Not on file     Social History Narrative    Retired from [employer]      Allergies   Allergen Reactions   • Iodine      Outpatient Encounter Prescriptions as of 1/23/2019   Medication Sig Dispense Refill   • losartan (COZAAR) 25 MG Tab TAKE 1 TAB BY MOUTH EVERY DAY. 90 Tab 0   • warfarin (COUMADIN) 5 MG Tab Take 1-1.5 tablets by mouth or as directed by [facility] 135 Tab 1   • Magnesium 400 MG Tab Take  by mouth.     • Calcium Carbonate (CALCIUM 600 PO) Take  by mouth.     • DILTIAZem CD (CARDIZEM CD) 240 MG CAPSULE SR 24 HR TAKE 1 CAP BY MOUTH EVERY DAY. 90 Cap 3   • vitamin D (CHOLECALCIFEROL) 1000 UNIT TABS Take 4,000 Units by mouth every day.     • amoxicillin-clavulanate (AUGMENTIN) 875-125 MG Tab TAKE 1 TABLET BY MOUTH TWICE A DAY  0     No facility-administered encounter medications on file as of 1/23/2019.      Review of Systems   Constitutional: Negative for chills and fever.   HENT: Positive for hearing loss. Negative for sore throat.    Eyes: Negative for blurred vision.   Respiratory: Negative for cough and shortness of breath.    Cardiovascular: Negative for chest pain, palpitations, claudication, leg swelling and PND.   Gastrointestinal: Negative for abdominal pain and nausea.   Musculoskeletal: Positive for joint pain. Negative for falls.   Skin: Negative for rash.   Neurological: Negative for dizziness, focal weakness and weakness.   Endo/Heme/Allergies: Does not bruise/bleed easily.        Objective:   /64 (BP Location: Left arm, Patient Position: Sitting, BP Cuff Size: Adult)   Pulse 82   Ht 1.626 m (5' 4\")   Wt 76.2 kg (168 lb)   SpO2 92%   BMI 28.84 kg/m²     Physical Exam "   Constitutional: No distress.   HENT:   Mouth/Throat: Oropharynx is clear and moist. No oropharyngeal exudate.   Eyes: No scleral icterus.   Neck: No JVD present.   Cardiovascular: Normal rate.  Exam reveals no gallop and no friction rub.    Murmur heard.  Pulmonary/Chest: No respiratory distress. He has no wheezes. He has no rales.   Abdominal: Soft. Bowel sounds are normal.   Musculoskeletal: He exhibits no edema.   Neurological: He is alert.   Skin: No rash noted. He is not diaphoretic.   Psychiatric: He has a normal mood and affect.       Assessment:     1. Essential hypertension     2. Long term current use of anticoagulant therapy  CBC WITH DIFFERENTIAL    COMP METABOLIC PANEL   3. Paroxysmal atrial fibrillation (HCC)     4. Presence of permanent cardiac pacemaker     5. History of TIA (transient ischemic attack) and stroke     6. Moderate aortic stenosis         Medical Decision Making:  Today's Assessment / Status / Plan:     It was my pleasure to meet with Mr. Milan.    We will update his labs with chronic anticoagulation    He is doing well for his moderate valvular disease I think overall given his advanced age she is unlikely to pursue TAVR but certainly could explore that it gets to be severe    Blood pressure is well controlled.      I will see Mr. Milan back in 6 months time and encouraged him to follow up with us over the phone or e-mail using my MyChart as issues arise.    It is my pleasure to participate in the care of Mr. Milan.  Please do not hesitate to contact me with questions or concerns.    Bentley Marshall MD PhD FACC  Cardiologist [facility]

## 2019-02-04 ENCOUNTER — APPOINTMENT (RX ONLY)
Dept: URBAN - METROPOLITAN AREA CLINIC 4 | Facility: CLINIC | Age: 84
Setting detail: DERMATOLOGY
End: 2019-02-04

## 2019-02-04 DIAGNOSIS — L91.8 OTHER HYPERTROPHIC DISORDERS OF THE SKIN: ICD-10-CM

## 2019-02-04 PROBLEM — D48.5 NEOPLASM OF UNCERTAIN BEHAVIOR OF SKIN: Status: ACTIVE | Noted: 2019-02-04

## 2019-02-04 PROCEDURE — ? BIOPSY BY SHAVE METHOD

## 2019-02-04 PROCEDURE — 67810 INCAL BX EYELID SKN LID MRGN: CPT

## 2019-02-04 ASSESSMENT — LOCATION ZONE DERM: LOCATION ZONE: EYELID

## 2019-02-04 ASSESSMENT — LOCATION SIMPLE DESCRIPTION DERM: LOCATION SIMPLE: RIGHT LOWER LID TARSAL MARGIN

## 2019-02-04 ASSESSMENT — LOCATION DETAILED DESCRIPTION DERM: LOCATION DETAILED: RIGHT TARSAL MARGIN OF THE INFERIOR EYELID

## 2019-02-04 NOTE — PROCEDURE: BIOPSY BY SHAVE METHOD
Post-Care Instructions: I reviewed with the patient in detail post-care instructions. Patient is to keep the biopsy site dry overnight, and then apply bacitracin twice daily until healed. Patient may apply hydrogen peroxide soaks to remove any crusting.
Electrodesiccation And Curettage Text: The wound bed was treated with electrodesiccation and curettage after the biopsy was performed.
Was A Bandage Applied: Yes
Dressing: bandage
Billing Type: Third-Party Bill
Type Of Destruction Used: Curettage
Detail Level: Detailed
Curettage Text: The wound bed was treated with curettage after the biopsy was performed.
Notification Instructions: Patient will be notified of biopsy results. However, patient instructed to call the office if not contacted within 2 weeks.
Biopsy Method: Personna blade
Hemostasis: Drysol
Silver Nitrate Text: The wound bed was treated with silver nitrate after the biopsy was performed.
Additional Anesthesia Volume In Cc (Will Not Render If 0): 0
Destruction After The Procedure: No
Lab Facility: 
Wound Care: Bacitracin
Cryotherapy Text: The wound bed was treated with cryotherapy after the biopsy was performed.
Anesthesia Type: 1% lidocaine with epinephrine
Consent: Written consent was obtained and risks were reviewed including but not limited to scarring, infection, bleeding, scabbing, incomplete removal, nerve damage and allergy to anesthesia.
Lab: [id number]
Biopsy Type: H and E
Electrodesiccation Text: The wound bed was treated with electrodesiccation after the biopsy was performed.
Anesthesia Volume In Cc: 2
Depth Of Biopsy: dermis

## 2019-02-26 NOTE — TELEPHONE ENCOUNTER
Refill X 6 months, sent to pharmacy.Pt. Seen in the last 6 months per protocol.   Lab Results   Component Value Date/Time    SODIUM 141 02/05/2019 11:34 AM    POTASSIUM 4.5 02/05/2019 11:34 AM    CHLORIDE 108 02/05/2019 11:34 AM    CO2 23 02/05/2019 11:34 AM    GLUCOSE 88 02/05/2019 11:34 AM    BUN 39 (H) 02/05/2019 11:34 AM    CREATININE 1.69 (H) 02/05/2019 11:34 AM

## 2019-02-26 NOTE — TELEPHONE ENCOUNTER
Was the patient seen in the last year in this department? Yes    Does patient have an active prescription for medications requested? No     Received Request Via: Pharmacy      Pt met protocol?: Yes    LAST OV 12/11/2018    BP Readings from Last 1 Encounters:   01/23/19 110/64     Lab Results   Component Value Date/Time    CHOLSTRLTOT 216 (H) 12/29/2012 09:08 AM     (H) 12/29/2012 09:08 AM    HDL 65 12/29/2012 09:08 AM    TRIGLYCERIDE 96 12/29/2012 09:08 AM       Lab Results   Component Value Date/Time    SODIUM 141 02/05/2019 11:34 AM    POTASSIUM 4.5 02/05/2019 11:34 AM    CHLORIDE 108 02/05/2019 11:34 AM    CO2 23 02/05/2019 11:34 AM    GLUCOSE 88 02/05/2019 11:34 AM    BUN 39 (H) 02/05/2019 11:34 AM    CREATININE 1.69 (H) 02/05/2019 11:34 AM     Lab Results   Component Value Date/Time    ALKPHOSPHAT 58 02/05/2019 11:34 AM    ASTSGOT 37 02/05/2019 11:34 AM    ALTSGPT 19 02/05/2019 11:34 AM    TBILIRUBIN 0.9 02/05/2019 11:34 AM

## 2019-04-08 NOTE — PROGRESS NOTES
Anticoagulation Summary  As of 4/8/2019    INR goal:   2.0-3.0   TTR:   80.0 % (3.9 y)   INR used for dosing:   3.05! (4/5/2019)   Warfarin maintenance plan:   7.5 mg (5 mg x 1.5) every Wed; 5 mg (5 mg x 1) all other days   Weekly warfarin total:   37.5 mg   Plan last modified:   Marion LimD (7/20/2018)   Next INR check:   5/20/2019   Target end date:   Indefinite    Indications    Paroxysmal atrial fibrillation (HCC) [I48.0]  Hx-TIA (transient ischemic attack) [Z86.73]             Anticoagulation Episode Summary     INR check location:   Outside Lab    Preferred lab:       Send INR reminders to:       Comments:   [phone]      Anticoagulation Care Providers     Provider Role Specialty Phone number    Taz cJ M.D. Referring Cardiology [phone]    Marion MackenzieD Responsible          Anticoagulation Patient Findings  Patient Findings     Negatives:   Signs/symptoms of thrombosis, Signs/symptoms of bleeding, Laboratory test error suspected, Change in health, Change in alcohol use, Change in activity, Upcoming invasive procedure, Emergency department visit, Upcoming dental procedure, Missed doses, Extra doses, Change in medications, Change in diet/appetite, Hospital admission, Bruising, Other complaints        Spoke with the patient's daughter on the phone today, reporting a therapeutic INR of 3.0.  Confirmed the current warfarin dosing regimen and patient compliance. Patient denies any interval changes to diet and/or medications. Patient denies any signs/symptoms of bleeding or clotting.  Patient instructed to continue with the current warfarin dosing regimen, and asked to follow up again in 6 weeks. (per pt preference)    Jake Webber  PharmD

## 2019-04-10 NOTE — PROGRESS NOTES
"SUBJECTIVE      Chief Complaint   Patient presents with   • Leg Swelling     x 1 week, L leg, no pain                Rash  This is a new problem. The problem has been gradually worsening since onset. Pain location:  left shin.  The rash is characterized by redness, swelling and pain.  Pain is mild, but throbbing, TTP.    Pt was exposed to nothing. Pertinent negatives include no congestion, cough, fatigue, fever or shortness of breath. Past treatments include nothing.     History   Substance Use Topics   • Smoking status: Never Smoker    • Smokeless tobacco: No    • Alcohol Use: No      Past medical history was unremarkable and not pertinent to current issue      Family History   Problem Relation Age of Onset   • Heart Disease Mother    • Heart Attack Mother    • Heart Disease Father    • Heart Attack Father          Review of Systems   Constitutional: Negative for fever and fatigue.   HENT: Negative for congestion.    Respiratory: Negative for cough and shortness of breath.    Cardiovascular: Negative for chest pain.   Gastrointestinal: Negative for abdominal pain.   Skin: Positive for rash.   Neurological: Negative for dizziness.   All other systems reviewed and are negative.         Objective:     /80   Pulse 89   Temp 36.5 °C (97.7 °F) (Temporal)   Resp 20   Ht 1.626 m (5' 4\")   Wt 74.8 kg (165 lb)   SpO2 91%       Physical Exam   Constitutional: pt is oriented to person, place, and time. Pt appears well-developed and well-nourished. No distress.   HENT:   Head: Normocephalic and atraumatic.   Eyes: Conjunctivae are normal. No scleral icterus.   Cardiovascular: Normal rate and regular rhythm.    Pulmonary/Chest: Effort normal and breath sounds normal. No respiratory distress.        Neurological: pt is alert and oriented to person, place, and time. No cranial nerve deficit.   Skin: Skin is warm. Pt is not diaphoretic.      Area of erythema, warmth, tender to touch over left shin.   2+ edema to " mid-shin      Nursing note and vitals reviewed.              Assessment/Plan:     1.  Cellulitis of left lower extremity     U/s personally reviewed - no DVT    - sulfamethoxazole-trimethoprim (BACTRIM DS) 800-160 MG tablet; Take 1 Tab by mouth 2 times a day for 7 days.  Dispense: 14 Tab; Refill: 0    On warfarin - advised to f/u in clinic for INR in 24-48 hrs

## 2019-04-10 NOTE — PROGRESS NOTES
Anticoagulation Summary  As of 4/10/2019    INR goal:   2.0-3.0   TTR:   80.0 % (3.9 y)   INR used for dosing:   No new INR was available at the time of this encounter.   Warfarin maintenance plan:   7.5 mg (5 mg x 1.5) every Wed; 5 mg (5 mg x 1) all other days   Weekly warfarin total:   37.5 mg   Plan last modified:   Ibeth Romero PharmD (7/20/2018)   Next INR check:   4/15/2019   Target end date:   Indefinite    Indications    Paroxysmal atrial fibrillation (HCC) [I48.0]  Hx-TIA (transient ischemic attack) [Z86.73]             Anticoagulation Episode Summary     INR check location:   Outside Lab    Preferred lab:       Send INR reminders to:       Comments:   [phone]      Anticoagulation Care Providers     Provider Role Specialty Phone number    Taz Jc M.D. Referring Cardiology [phone]    Jean Ayala, PharmD Responsible          Anticoagulation Patient Findings    Spoke to patient's daughter on phone. Instructed to hold warfarin x 2 doses due to interaction with Septra. Will resume previously prescribed dose on Saturday. Follow up INR on Monday.    Jean Ayala, MarionD

## 2019-04-16 NOTE — PROGRESS NOTES
Anticoagulation Summary  As of 2019    INR goal:   2.0-3.0   TTR:   80.0 % (3.9 y)   INR used for dosin.57 (4/15/2019)   Warfarin maintenance plan:   7.5 mg (5 mg x 1.5) every Wed; 5 mg (5 mg x 1) all other days   Weekly warfarin total:   37.5 mg   Plan last modified:   Ibeth Romero, PharmD (2018)   Next INR check:   2019   Target end date:   Indefinite    Indications    Paroxysmal atrial fibrillation (HCC) [I48.0]  Hx-TIA (transient ischemic attack) [Z86.73]             Anticoagulation Episode Summary     INR check location:   Outside Lab    Preferred lab:       Send INR reminders to:       Comments:   [phone]      Anticoagulation Care Providers     Provider Role Specialty Phone number    Taz Jc M.D. Referring Cardiology [phone]    Jean Ayala, PharmD Responsible          Anticoagulation Patient Findings    Spoke with Mrs. Milan, to report a therapeutic INR of 2.57. Continue current dosing regimen.  Follow up in 2 weeks, to reduce the risk of adverse events related to this high risk medication, warfarin.    Saira Bazan, Clinical Pharmacist

## 2019-04-17 PROBLEM — Z01.89 ENCOUNTER FOR GERIATRIC ASSESSMENT: Status: ACTIVE | Noted: 2019-01-01

## 2019-04-17 PROBLEM — T14.90XA TRAUMA: Status: ACTIVE | Noted: 2019-01-01

## 2019-04-17 PROBLEM — S01.01XA SCALP LACERATION: Status: ACTIVE | Noted: 2019-01-01

## 2019-04-17 PROBLEM — S06.6X0A SUBARACHNOID HEMORRHAGE FOLLOWING INJURY, NO LOSS OF CONSCIOUSNESS (HCC): Status: ACTIVE | Noted: 2019-01-01

## 2019-04-17 PROBLEM — Z53.09 CONTRAINDICATION TO DEEP VEIN THROMBOSIS (DVT) PROPHYLAXIS: Status: ACTIVE | Noted: 2019-01-01

## 2019-04-17 PROBLEM — S61.215A LACERATION OF LEFT RING FINGER: Status: ACTIVE | Noted: 2019-01-01

## 2019-04-18 PROBLEM — Z75.8 DISCHARGE PLANNING ISSUES: Status: ACTIVE | Noted: 2019-01-01

## 2019-04-18 PROBLEM — Z02.9 DISCHARGE PLANNING ISSUES: Status: ACTIVE | Noted: 2019-01-01

## 2019-04-18 PROBLEM — Z78.9 NO CONTRAINDICATION TO DEEP VEIN THROMBOSIS (DVT) PROPHYLAXIS: Status: ACTIVE | Noted: 2019-01-01

## 2019-04-18 PROBLEM — R79.89 ELEVATED SERUM CREATININE: Status: ACTIVE | Noted: 2019-01-01

## 2019-04-18 NOTE — THERAPY
"Speech Language Therapy Evaluation completed to address cognition  Functional Status:  Pt seen this date for cognitive-linguistic evaluation 2/2 ICH. Pt was A&Ox4,  awake, alert, talkative, followed directions with repetition and participated in evaluation. Pt wears bilateral hearing aids, and reading glasses (glasses not present). The Cognitive-Linguistic Quick Test (CLQT) was administered in its entirety in conjunction with nonstandardized assessments. Pt reports large amount of family support (daughter bringing/cooking food, grandchildren visiting, hospice care for wife). He reports he independently manages finances, cooks, drives, fills prescriptions and grocery shops. Pt reported all medication he takes, including dosage, and that he uses a pill box.     Pt earned a composite severity rating of 2.2 indicating a moderate cognitive-linguistic deficit. Of note, pt's scores are being compared to skills of persons in the 70-89 years of age range. Pt scored WFL for Clock Drawing subtest (13). Pt earned a mild severity rating for the cognitive domains of Memory (135), and Language (27). Pt earned a moderate severity rating for the cognitive domains of Executive Functions (11) and Visuospatial Skills (30). Pt earned a severe severity rating for the cognitive domain of Attention (31). Pt's scores impacted by [place] status and suspected visual impairment without reading glasses (pt reported he could \"see just fine\" without his reading glasses). Throughout assessment pt demonstrated difficulty following moderate to complex directions, requiring repetition of instructions with additional examples. Pt demonstrated memory strategy of repetition during design memory test.    Strengths include orientation, long-term memory, auditory math, self-monitoring, following 2-3 step directions, writing, sentence formulation, functional problem solving, sequencing, generative and confrontation naming, and clock drawing. Weaknesses include " "attention, moderate-complex direction following, auditory comprehension, visuospatial skills, moderate-complex thought organization.     Pt presents with mild-moderate cognitive-linguistic deficits characterized by deficits related to attention, direction following and auditory comprehension (suspect impacted by [place] status).  Pt appears to have strong and consistent support system at home, and demonstrates adequate functional problem solving skills andmedication management.  2/2 visuospatial skill deficits, recommend pt does not return to driving until cleared by PCP. Suspect pt is functioning at baseline. SLP not actively following, however, is available for consultation and education if requested. Pt may benefit from home health or outpatient speech services should pt/family notice any changes related to cognition, or post concussion symptoms.      Recommendations:  Suspect pt is functioning at baseline. SLP not actively following, however, is available for consultation and education if requested. Pt may benefit from home health or outpatient speech services should pt/family notice any changes related to cognition, or post concussion symptoms.    Plan of Care: Patient with no further skilled SLP needs in the acute care setting at this time  Post-Acute Therapy: Recommend outpatient or home health transitional care services for continued speech therapy services      See \"Rehab Therapy-Acute\" Patient Summary Report for complete documentation.   "

## 2019-04-18 NOTE — ED NOTES
Back from ct. md at bs to suture pt. Pharm at bs to verify coumadin schedule. Pt & family updated c plan to admit. Denies any needs. Will ctm.

## 2019-04-18 NOTE — PROGRESS NOTES
2 RN Skin check completed  Patient has stapled laceration to the right temporal region and left ring finger. Polysporin applied per MAR. Patient has right palm laceration. Patient has scattered abrasions and bruising throughout. Elbows red but blanching, Sacrum intact, mepilex in place.

## 2019-04-18 NOTE — ASSESSMENT & PLAN NOTE
Mechanical ground-level fall on warfarin. Small traumatic subarachnoid hemorrhage.  Non Trauma Activation.  Omega Howard MD. Trauma Surgery.

## 2019-04-18 NOTE — ASSESSMENT & PLAN NOTE
Chronic condition treated with Diltiazem and Coumadin.  Diltiazem resumed on admission.  4/18 Call placed to Neurosurgery, may resume Coumadin in 10 days (4/28).

## 2019-04-18 NOTE — DISCHARGE PLANNING
Trauma Response    Referral: Trauma Yellow Response    Intervention: SW responded to trauma yellow.  Pt was PAM DOMINGUEZ after GLF. Pts name is Baldo Milan (: 1921).  SW obtained the following pt information: Pt was alert and talking upon arrival and was roomed in G39. Pt was upgraded to Trauma Yellow and moved to trauma bay due to CT findings. Pt's dtr, Hollie Milan ([phone]), has been at bedside with the pt and followed pt up to S110.     Plan: SW will remain available as needed.

## 2019-04-18 NOTE — PROGRESS NOTES
2006: Received call from ED regarding anticipated ICU admission and patient upgrade to trauma yellow due to CT findings. Per ED RN, do not come to ED to chart on trauma per usual procedure, patient to come directly to ICU.    2023: Primary RN to ED with CCT for patient pickup.

## 2019-04-18 NOTE — ED TRIAGE NOTES
Chief Complaint   Patient presents with   • GLF     Pt BIB EMS, MGLF. pt denies LOC. pt with laceration to head, left ring finger and right palm.    • Laceration

## 2019-04-18 NOTE — THERAPY
"Pt is a 89 y/o male presenting to acute PT s/p GLF with subsequent small SAH and head laceration. Pt demonstrated slight impairments in gait and balance, bed mobility NT this session. Pt reports good support from dtrs; however, is 1* caregiver for spouse with dementia. Pt will benefit from acute PT interventions to address present impairments and optimize functional mobility and safety prior to DC home.     Physical Therapy Evaluation completed.   Bed Mobility:  Supine to Sit:  (in chair pre/post PT eval)  Transfers: Sit to Stand: Minimal Assist  Gait: Level Of Assist: Minimal Assist with No Equipment Needed       Plan of Care: Will benefit from Physical Therapy 5 times per week  Discharge Recommendations: Equipment: Will Continue to Assess for Equipment Needs.   Post-acute therapy: Anticipate pt will be able to return home with family support and Home health therapy to ensure safety and return to independent PLOF upon DC home.       See \"Rehab Therapy-Acute\" Patient Summary Report for complete documentation.     "

## 2019-04-18 NOTE — CARE PLAN
Problem: Knowledge Deficit  Goal: Knowledge of disease process/condition, treatment plan, diagnostic tests, and medications will improve  Outcome: PROGRESSING AS EXPECTED  Discussed plan of care with patient and with family over the phone. All questions answered at this time.     Problem: Pain Management  Goal: Pain level will decrease to patient's comfort goal    Intervention: Follow pain managment plan developed in collaboration with patient and Interdisciplinary Team  Assessing patients pain q2 hours and PRN, administering medication per MAR.

## 2019-04-18 NOTE — ED NOTES
Med rec updated and complete.  Allergies reviewed. No oral antibiotic use in last 30 days   At home.

## 2019-04-18 NOTE — PROGRESS NOTES
Trauma Progress Note 4/18/2019 6:31 AM    Briefly, this is a 89 y.o. male with a head injury following a ground level fall.    Approximate resuscitation given to this point includes: 0 U PRBC, 0 U FFP, 0 U platelets and 0.8 L crystalloid.    /57   Pulse 60   Temp 36.6 °C (97.9 °F) (Temporal)   Resp 18   Wt 74.3 kg (163 lb 12.8 oz)   SpO2 97%   BMI 28.12 kg/m²     Hemoglobin: 14.0 g/dL  Hematocrit: 41.4 %    INR: 1.28 following KCentra, 2.33 on admission    Recent Labs      04/15/19   1301  04/17/19   1711  04/18/19   0448   APTT   --   46.0*   --    INR  2.57*  2.33*  1.28*              Urine Output: 650ml since admission    Assessment: Alert and oriented x4.  Repeat CT without significant change or progression.    Additional plans: Continue current plan of care

## 2019-04-18 NOTE — PROGRESS NOTES
2030: Pt to S110 accompanied by ACLS RN and CCT. VSS on arrival:   HR: 82-90  BP: 147/67   RR: 26  SpO2: 92% (2L applied, expiratory wheezes noted)  Temp: 98.6F  Weight: 74.3kg    Pt incontinent of moderate amount of urine in briefs on arrival. Conversational.    2-RN skin check performed with RADHA Bunch. Noted as follows:   -Lacerations to R temporal region and L ring finger, both stapled in ED with moderate new drainage/oozing at this time. Gauze wrap placed to finger, head left ELISA with absorbent pad beneath. Photos taken.  -R palm lac, covered with band-aid.   -Scattered excoriations/POA abrasions to BUE/BLE (shins). Diffuse purple BUE bruising (forearms) noted.     Heels flaky, intact. Elbows red and blanching. Sacrum intact. Mepilex placed.     Family (wife, Madhavi) brought bedside and updated by primary RN on POC thus far. MD Howard of pt admission to S110.

## 2019-04-18 NOTE — CARE PLAN
Problem: Infection  Goal: Will remain free from infection  Outcome: PROGRESSING AS EXPECTED  Implement standard precautions and perform hand washing before and after patient contact. Standard precautions implemented and hand washing performed before and after patient contact and as needed.    Problem: Knowledge Deficit  Goal: Knowledge of the prescribed therapeutic regimen will improve  Outcome: PROGRESSING AS EXPECTED  Discussed continued plan of care with pt and daughter at bedside. Answered all questions.

## 2019-04-18 NOTE — CONSULTS
[facility] Geriatric Consult.   Patient Name: Primitivo Milan  Patient Age, Gender: 89 y.o., male  Date of Consult:4/18/2019      Name of Requesting Consultant(s): Omega Howard M.D.  Consultant: Omega Zaragoza M.D.  Reason for Consult: Fall in a geriatric patient    Chief Complaint:   Chief Complaint   Patient presents with   • GLF     Pt BIB EMS, MGLF. pt denies LOC. pt with laceration to head, left ring finger and right palm.    • Laceration       History of Present Illness:  Primitivo is a 89 y.o. male past medical history of atrial fibrillation on chronic Coumadin therapy, status post pacemaker presents after a ground-level fall.  Patient is a good historian and history is provided by the patient, medical chart.  Patient tells me that he was taking out the recycling and upon his second trip had an episode where he fell backwards and hit the right posterior portion of his head.  Denies any loss of consciousness denies any tongue biting confusion afterwards.  He did have some difficulty getting up.    At baseline the patient's is completely independent his ADLs and IADLs except that he does get some help with occasional cleaning and cooking from his daughters.  He says that he is daughter's visit every day and his grandson also visits every morning and has coffee with him.     He continues to drive.  Manages her medications and was able to list all of his medications out to me today.  He denies any current confusion.  He is hard of hearing and does wear bilateral hearing aids.  He denies any falls within the last year denies any issues with with a walker or cane at baseline.    He lives with his wife who has some oxygen dependent respiratory issues and he does provide some care for her.  He feels that he has been having some proximal lower extremity weakness recently and he has had difficulty getting up when he drops things on the ground and has to pick them up.  He denies any orthostatic symptoms such as  lightheadedness with standing or position changes.  He feels safe to go home today if he were to be discharged    On admission the patient was found to have a subarachnoid hemorrhage, his Coumadin was reversed.  Neurosurgery has waited and feels that he no longer needs does not need any follow-up for his injury seen on CT scan.    Ronny plans to transfer the patient outside of the unit today    Patient endorses some pain in the posterior portion of his head,    Patient also has history of some kidney masses, prostate cancer, left eye cancer, TIA, all appear to be not acutely active issues per    ROS:A 14 sytem ROS is negative other than as stated above in HPI.     Past Medical History:   Diagnosis Date   • Anticoagulation monitoring, special range    • Atrial fibrillation (HCC)    • Atrioventricular block, complete (HCC)    • Bell's palsy    • Cancer (HCC) 2001    left eye, radiation   • Cancer (HCC) 1991    prostate, surgery   • Essential hypertension    • Extranodal lymphoma (HCC)    • Kidney mass     CT   • Other dyspnea and respiratory abnormality    • Other malignant lymphomas, unspecified site, extranodal and solid organ sites    • Paroxysmal atrial fibrillation (HCC)    • Personal history of malignant neoplasm of prostate    • Personal history of venous thrombosis and embolism    • Presence of permanent cardiac pacemaker     September 2009:  Medtronic Versa VEDR01 implanted by Dr. Lexa Lopes.    • Renal disorder 1970    stones   • Sleep apnea     Cannot use CPAP machine.   • Stroke (HCC) 1991   • TIA (transient ischemic attack)        Current Facility-Administered Medications:   •  enoxaparin (LOVENOX) inj 30 mg, 30 mg, Subcutaneous, Q24HRS, Tete Roy, A.P.R.N., 30 mg at 04/18/19 1137  •  Respiratory Care per Protocol, , Nebulization, Continuous RT, Omega Howard M.D.  •  Pharmacy Consult Request ...Pain Management Review 1 Each, 1 Each, Other, PHARMACY TO DOSE, Omega Howard M.D.  •  docusate sodium  (COLACE) capsule 100 mg, 100 mg, Oral, BID, Omega Howard M.D.  •  senna-docusate (PERICOLACE or SENOKOT S) 8.6-50 MG per tablet 1 Tab, 1 Tab, Oral, Nightly, Omega Howard M.D.  •  senna-docusate (PERICOLACE or SENOKOT S) 8.6-50 MG per tablet 1 Tab, 1 Tab, Oral, Q24HRS PRN, Omega Howard M.D.  •  polyethylene glycol/lytes (MIRALAX) PACKET 1 Packet, 1 Packet, Oral, BID, Omega Howard M.D., Stopped at 04/18/19 0600  •  magnesium hydroxide (MILK OF MAGNESIA) suspension 30 mL, 30 mL, Oral, DAILY, Omega Howard M.D.  •  bisacodyl (DULCOLAX) suppository 10 mg, 10 mg, Rectal, Q24HRS PRN, Omega Howard M.D.  •  fleet enema 133 mL, 1 Each, Rectal, Once PRN, Omega Howard M.D.  •  ondansetron (ZOFRAN) syringe/vial injection 4 mg, 4 mg, Intravenous, Q4HRS PRN, Omega Howard M.D.  •  bacitracin-polymyxin b (POLYSPORIN) 500-16261 UNIT/GM ointment, , Topical, TID, Omega Howard M.D., 1 Each at 04/18/19 1137  •  acetaminophen (TYLENOL) tablet 650 mg, 650 mg, Oral, Q4HRS PRN **OR** acetaminophen (TYLENOL) suppository 650 mg, 650 mg, Rectal, Q4HRS PRN, Omega Howard M.D.  •  calcium citrate (CALCITRATE) tablet 950 mg, 950 mg, Oral, DAILY, Omega Howard M.D., 950 mg at 04/18/19 0532  •  DILTIAZem CD (CARDIZEM CD) capsule 240 mg, 240 mg, Oral, QDAY, Omega Howard M.D., 240 mg at 04/18/19 0532  •  losartan (COZAAR) tablet 25 mg, 25 mg, Oral, QDAY, Omega Howard M.D., 25 mg at 04/18/19 0532  •  magnesium oxide (MAG-OX) tablet 400 mg, 400 mg, Oral, DAILY, Omega Howard M.D., 400 mg at 04/18/19 0532  •  vitamin D (cholecalciferol) tablet 2,000 Units, 2,000 Units, Oral, DAILY, Omega Howard M.D., 2,000 Units at 04/18/19 0531  Social History     Social History   • Marital status:      Spouse name: N/A   • Number of children: N/A   • Years of education: N/A     Occupational History   • Not on file.     Social History Main Topics   • Smoking status: Never Smoker   • Smokeless tobacco: Never Used   • Alcohol use No   •  "Drug use: No   • Sexual activity: No     Other Topics Concern   • Not on file     Social History Narrative    Retired from [employer]      Family History   Problem Relation Age of Onset   • Heart Disease Mother    • Heart Attack Mother    • Heart Disease Father    • Heart Attack Father      Past Surgical History:   Procedure Laterality Date   • RECOVERY  5/19/2011    Performed by SURGERY, IR-RECOVERY at SURGERY SAME DAY [facility] ORS   • PACEMAKER INSERTION  September 2009    Medtronic Versa VEDR01 implanted by Dr. Lexa Lopes.   • EYE SURGERY  2006    left eye mass/ cancer/ s/p radiation   • LITHOTRIPSY  1995    left kidney x 3   • PROSTATECTOMY, RADICAL RETRO  1991   • EYE SURGERY           Physical Exam:  /60   Pulse 63   Temp 36.3 °C (97.4 °F) (Temporal)   Resp 16   Ht 1.676 m (5' 6\")   Wt 74.3 kg (163 lb 12.8 oz)   SpO2 95%   BMI 26.44 kg/m²   General: No acute distress alert oriented x4  HEENT: Traumatic right posterior head wound, staples in place some dried blood.  Neck supple, hard of hearing bilateral hearing aids in place  Cardiovascular regular rate and rhythm: 3 out of 6 holosystolic murmur heard best heard at the apex, with radiation to the axilla, 2+ radial pulses bilaterally, no JVD  Lungs: Clear to auscultation bilaterally, normal effort  Abdomen: Soft nontender nondistended  Extremities: Arthritic changes noted to hands bilaterally  Delirium Screen: Negative all 4 features able to say days of week backwards without a    Labs: CBC, CMP coag studies reviewed  Imaging: CT head reviewed  EKG: EKG showed AV dual paced rhythm, with a QTC of 42    Assessment: 89-year-old male admitted after a ground-level fall and subsequent traumatic subarachnoid hemorrhage.      Plan:  Fall  -The main risk factor related to the patient's fall is likely proximal lower extremity weakness.  At discharge I would recommend either outpatient PT or home health PT to help the patient improve his lower " extremity strength, as well as a home safety evaluation to minimize any hazards that may increase his fall risk.  His medications do not appear to obviously be adversely affecting his position.  This is his only fall within the last year.    Traumatic subarachnoid hemorrhage  Scalp laceration  Laceration of left ring finger  Management per trauma neurosurgeon  Per recent note staple removal and resume with anticoagulation around 10 days    History of dual AV node pacemaker placement  Hypertension  Atrial fibrillation  -Recommend to continue home losartan and diltiazem  -Hold intake regulation per neurosurgery, trauma    CKD  -His creatinine appears to be at baseline, encourage oral intake    Disposition  -Patient can likely be discharged when cleared by trauma, mobilizing him while he is in house will be the most beneficial thing we can do for him to ensure that he can be discharged home.  Discussed mobilization with nursing    Interventions to be considered in all patients in order to minimize the risk of delirium.   -do not disturb patient (vitals or lab draws) between the hours of 10 PM and 6 AM.  -ideally the patient should not sleep during the day and we should avoid day time naps.   -up in chair for meals  -ambulate at least three times daily, as able  -watch for constipation  -timed voiding - ask patient is she would like to go to the bathroom q 2-3 hours, except during the do not disturb hours.   -remove all necessary lines (central lines, peripheral IVs, feeding tubes, tran catheters)  -unless patient has shown harm to self or others I would recommend against use of restraints - either chemical or physical (antipsychotics)   -minimize polypharmacy, do not dose medication during sleep hours      Thank you for this interesting consult. We will continue to follow this patient along with you.       Omega Zaragoza M.D.  Geriatric Attending  [facility]  Available Monday-Friday 8:00-5:00 (excudling  holidays)    This note was partially dictated with voice recognition software, for any confusion please do not hesitate to contact me.       Direct Links to Patient Handouts:    [facility] Healthy Aging  [facility] National [place] on Aging  [facility] Patient Resources    Links that can be Cut and Paste into your browser:    Healthy Aging  www.healthinaging.org  Staying Active  www.activeandhealthy.[place].gov.au  Geriatrics Online   [email or web address]

## 2019-04-18 NOTE — CONSULTS
DATE OF SERVICE:  04/17/2019    EMERGENCY ROOM CONSULTATION    CHIEF COMPLAINT:  Ground level fall, minimal intracranial hemorrhage.    HISTORY OF PRESENT ILLNESS:  This is a 89-year-old right-handed man who fell   from standing today.  He had no loss of conscious.  He denies weakness,   numbness or headache.  He has a trace amount of left frontal subarachnoid   blood, no overlying skull fracture.  He was admitted to the trauma service.    PAST MEDICAL HISTORY:  Significant for atrial fibrillation, AV block, cancer,   hypertension, kidney lesion, prostate cancer, previous history of VTE,   pacemaker, renal disorders, stroke, sleep apnea, TIA.    PAST SURGICAL HISTORY:  Include kidney surgery, prostate, eye surgery, and   pacemaker insertion    ALLERGIES:  HE IS ALLERGIC TO IODINE.    MEDICATIONS:  Listed in the chart.    SOCIAL HISTORY:  He is a nonsmoker, only drank in the past.    PHYSICAL EXAMINATION:  GENERAL:  He is awake.  He is alert.  He is oriented x3, hard of hearing.  HEENT:  His left pupil is 2.5 mm, nonreactive cataract; the right one is 1.5   mm, nonreactive.  His face is full.  His tongue is midline.  His extraocular   motions are intact.  NEUROLOGIC:  He has no pronator drift.  He moves his arms and legs   symmetrically with good sensation in the face, arms, and legs.    ASSESSMENT AND PLAN:  The patient has minimal intracranial hemorrhages, thus   we need to repeat CAT scan unless he declined.  He can be admitted for q. 4   hour neuro checks.  He does not need to follow up with me regarding this.  He   does not need Keppra.  He is okay for Lovenox.  I will defer all the care to   trauma service.    Thanks for allowing us to participate in his care.       ____________________________________     MD DENNISE Longoria IIIP / NTS    DD:  04/17/2019 23:25:41  DT:  04/18/2019 00:57:57    D#:  [id number]  Job#:  [id number]

## 2019-04-18 NOTE — NON-PROVIDER
Patient Summary:  Patient is a 89 year old male who presented to the ED following a ground level fall yesterday afternoon and suffered a small intracranial hemorrhage and a scalp laceration s/p staple repair in the ED.    24 Hour Events:    Repeat CT this AM showed no changes in L frontal lobe subarachnoid hemorrhage.  Warfarin anticoagulation reversed with Kcentra and Vitamin K  Okay for Lovenox per Dr Ridley  Does not need Keppra per Dr Ridley    SUBJECTIVE/OBJECTIVE:  NEURO:  GCS  24hr: 15   Current: 15   Exam No focal deficits, cranial nerves 2-12 intact. Strength is 5/5 in upper and lower extremities, sensation is intact throughout   Meds/Pain Keppra 500mg IV BID  Acetaminophen 650mg PO PRN   Labs PT 25.6 > 16.1  INR 2.33 > 1.28  APTT 46.0   Imaging CT Head w/o 4/17:    1.  Small amount of subarachnoid hemorrhage in the sulci of the left frontal lobe. No mass effect or midline shift.    2.  Diffuse atrophy and periventricular white matter change, consistent with chronic small vessel disease.    3.  Calcified extra-axial mass in the anterior right middle cranial fossa is consistent with a meningioma    CT Head w/o 4/18    No change from above     RESP:  RR, SpO2 RR 16 to 33 since admission  SpO2 94 - 94% since admission   Vent Not intubated, receiving 1.5L O2 by NC   Exam CTAB no rhonchi or rales, no accessory muscle use   Meds None   Labs None   Imaging None     CV:  HR/BP/MAP/  CVP HR 63 to 100 since admission     to 162 systolic   52 to 73 diastolic    MAP: 85 to 117     Exam Regular rate and rhythm, no murmurs, rubs, or gallops   Meds Diltiazem 240mg PO once daily   Losartan 25mg PO once daily   Labs None   Imaging EKG on admission: Atrial pacing with rate of 66, no ST or T wave abnormalities     GI:  Diet/Bowel Function Patient is NPO  Docusate 100mg PO bid refused twice so far  Polyethylene glycol bid refused twice so far  Magnesium hydroxide 30mL PO refused   Exam Bowel sounds present, soft, nontender  and nondistended   Labs None   Imaging None     F/E/N-:  I/O  24hr totals: ..    Intake/Output Summary (Last 24 hours) at 04/18/19 0800  Last data filed at 04/18/19 0600   Gross per 24 hour   Intake           813.75 ml   Output             1200 ml   Net          -386.25 ml     Intake: 813.75mL IV  Output 1200mL urine     LR 75mL/hour   Exam No catheter in place   Meds Magnesium oxide 400mg PO once daily  Vitamin D 2000 units PO once daily  Cacium citrate 950mg PO once daily   Labs PT 25.6 > 16.1  INR 2.33 > 1.28  APTT 46.0    Results for SIXTO LUDWIG (MRN [id number]) as of 4/18/2019 07:20   Ref. Range 4/18/2019 04:48   Sodium Latest Ref Range: 135 - 145 mmol/L 134 (L)   Potassium Latest Ref Range: 3.6 - 5.5 mmol/L 4.3   Chloride Latest Ref Range: 96 - 112 mmol/L 106   Co2 Latest Ref Range: 20 - 33 mmol/L 21   Anion Gap Latest Ref Range: 0.0 - 11.9  7.0   Glucose Latest Ref Range: 65 - 99 mg/dL 91   Bun Latest Ref Range: 8 - 22 mg/dL 30 (H)   Creatinine Latest Ref Range: 0.50 - 1.40 mg/dL 1.63 (H)   GFR If  Latest Ref Range: >60 mL/min/1.73 m 2 48 (A)   GFR If Non  Latest Ref Range: >60 mL/min/1.73 m 2 39 (A)   Calcium Latest Ref Range: 8.5 - 10.5 mg/dL 8.9   AST(SGOT) Latest Ref Range: 12 - 45 U/L 34   ALT(SGPT) Latest Ref Range: 2 - 50 U/L 18   Alkaline Phosphatase Latest Ref Range: 30 - 99 U/L 61   Total Bilirubin Latest Ref Range: 0.1 - 1.5 mg/dL 1.3   Albumin Latest Ref Range: 3.2 - 4.9 g/dL 3.7   Total Protein Latest Ref Range: 6.0 - 8.2 g/dL 6.2   Globulin Latest Ref Range: 1.9 - 3.5 g/dL 2.5   A-G Ratio Latest Units: g/dL 1.5   Phosphorus Latest Ref Range: 2.5 - 4.5 mg/dL 2.6   Magnesium Latest Ref Range: 1.5 - 2.5 mg/dL 1.8       Sodium 136 > 134  BUN 33 > 30  Cr 1.75 > 1.63   Nutrition NPO     HEME:  Labs Results for OZIEL SIXTO KRIS (MRN [id number]) as of 4/18/2019 07:20   Ref. Range 4/18/2019 04:48   WBC Latest Ref Range: 4.8 - 10.8 K/uL 6.4   RBC Latest Ref  Range: 4.70 - 6.10 M/uL 4.33 (L)   Hemoglobin Latest Ref Range: 14.0 - 18.0 g/dL 14.0   Hematocrit Latest Ref Range: 42.0 - 52.0 % 41.4 (L)   MCV Latest Ref Range: 81.4 - 97.8 fL 95.6   MCH Latest Ref Range: 27.0 - 33.0 pg 32.3   MCHC Latest Ref Range: 33.7 - 35.3 g/dL 33.8   RDW Latest Ref Range: 35.9 - 50.0 fL 50.1 (H)   Platelet Count Latest Ref Range: 164 - 446 K/uL 160 (L)   MPV Latest Ref Range: 9.0 - 12.9 fL 10.2   Neutrophils-Polys Latest Ref Range: 44.00 - 72.00 % 75.70 (H)   Neutrophils (Absolute) Latest Ref Range: 1.82 - 7.42 K/uL 4.80   Lymphocytes Latest Ref Range: 22.00 - 41.00 % 11.00 (L)   Lymphs (Absolute) Latest Ref Range: 1.00 - 4.80 K/uL 0.70 (L)      Transfusions None   Imaging None     ID:  Temp  24hr: 36.6 to 37.2 C 97.9 to 98.9 F   Tmax: 98.9F   Labs None   Micro None   ABX Polysporin 500-13305 U/gram topical TID      ENDOCRINE:  Blood Sugar 93 > 91   Meds None     MUSCULOSKELETAL:  Imaging None   WB Status Able to ambulate one foot to chair.     SKIN:  Exam No evidence of infection at laceration sites, otherwise warm, dry, well perfused, no rash present,    Interventions None     ASSESSMENT/PLAN:  NEURO:  Small SAH in L frontal lobe sulci with no mass effect or midline shift:  Discontinue Keppra, per Dr Ridley  Transcranial doppler to assess for vasopasm  Nimodipine 60mg q4h  Monitor for symptoms of vasospasm and coronary ischemia for 24 hours    RESP:  Continue to monitor and titrate O2 as needed    CV:  Chronic anticoagulation for A-fib:   Initiate Lovenox 30mg subcutaneous injection  Continue to trend INR  Continue home diltiazem for rate control  Essential HTN:   Continue on home losartan      GI:  Convert to full diet      FEN-:  Convert to normal diet  Monitor sodium status to assess for SIADH    HEME:  Continue to monitor CBC, assessing for signs of blood loss or infection  Warfarin anticoagulation reversed with Vitamin K and KCentra yesterday, initiate Lovenox today   Continue to  trend INR    ID:  Monitor laceration repair sites to assess for superficial wound infection    ENDOCRINE:   None    MUSCULOSKELETAL:  None    SKIN:  Scalp laceration:  Remove staples in 7 days  Continue Polysporin ointment TID    PROPHYLAXIS:  DVT SCD's, Lovenox   GI Bowel regimen as noted above   Restraints None     LINES/TUBES/CATHETERS:  Peripheral IV placed yesterday  Straight cath as needed for urinary retention

## 2019-04-18 NOTE — ASSESSMENT & PLAN NOTE
Small amount of subarachnoid hemorrhage in the sulci of the left frontal lobe. No mass effect or midline shift.  Warfarin anticoagulation reversed on admission.  Repeat interval CT imaging of the brain demonstrated no significant change or progression.  Non-operative management.   No Keppra needed.  No follow up needed.  Luiz Ridley III, MD. Neurosurgery (sign off 4/17).

## 2019-04-18 NOTE — PROGRESS NOTES
Trauma / Surgical Daily Progress Note    Date of Service  4/18/2019    Chief Complaint  89 y.o. male admitted 4/17/2019 with Trauma    Interval Events  New admit to SICU after mechanical GLF and small head bleed while on coumadin  Reversed with KCentra and vitamin K  Repeat head CT stable  Neurosurgery recommendations reviewed, discussed Coumadin with Hunter Tijerina PA-C - may resume Coumadin in 10 days  Sore at staple and suture sites, no other complaints  Wants to go home today if possible, is primary caregiver for wife, family checks in on pt and wife daily and has plenty of family support  Tertiary survey completed, no further findings  RAP score 11, cleared for Lovenox per neurosurgery  SBIRT negative, denies alcohol, tobacco or illicit drug use    - PT/OT, SLP for cog eval  - Medically stable for transfer to Neuro, attending Dr. Howard updated    Review of Systems  Review of Systems   Constitutional: Negative for chills and fever.   HENT: Positive for hearing loss (premorbid, hearing aides in place).    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 Negative for blurred vision.   Respiratory: Negative for shortness of breath.    Cardiovascular: Negative for chest pain.   Gastrointestinal: Negative for abdominal pain, nausea and vomiting.        BM prior to arrival   Genitourinary: Negative for dysuria.        Voiding   Musculoskeletal: Negative for back pain, joint pain, myalgias and neck pain.   Skin: Negative for rash.        Scalp and left ring finger sore to touch   Neurological: Negative for dizziness, speech change, focal weakness and headaches.   Psychiatric/Behavioral: Negative for substance abuse.        Vital Signs  Temp:  [36.2 °C (97.2 °F)-37.2 °C (99 °F)] 36.2 °C (97.2 °F)  Pulse:  [55-76] 63  Resp:  [16-33] 23  BP: (118-131)/(51-64) 129/57  SpO2:  [92 %-97 %] 96 %    Physical Exam  Physical Exam   Constitutional: He is  oriented to person, place, and time. He appears well-developed. No distress.   HENT:   Head: Normocephalic.   Right parietal/occipital staples intact   Eyes: Conjunctivae are normal.   Left pupil slightly larger than right, unchanged since admission   Neck: Normal range of motion. Neck supple. No JVD present. No tracheal deviation present.   Cardiovascular: Normal rate and intact distal pulses.  An irregularly irregular rhythm present.   Murmur heard.  Pulmonary/Chest: Effort normal and breath sounds normal. No respiratory distress. He exhibits no tenderness.   Abdominal: Soft. Bowel sounds are normal. He exhibits no distension. There is no tenderness. There is no guarding.   Musculoskeletal:   Moves all extremities   Neurological: He is alert and oriented to person, place, and time. GCS eye subscore is 4. GCS verbal subscore is 5. GCS motor subscore is 6.   Skin: Skin is warm and dry.   Left ring finger sutures intact   Psychiatric: He has a normal mood and affect. His behavior is normal.   Nursing note and vitals reviewed.      Laboratory  Recent Results (from the past 24 hour(s))   CBC WITHOUT DIFFERENTIAL    Collection Time: 04/17/19  5:11 PM   Result Value Ref Range    WBC 5.9 4.8 - 10.8 K/uL    RBC 4.43 (L) 4.70 - 6.10 M/uL    Hemoglobin 14.4 14.0 - 18.0 g/dL    Hematocrit 44.0 42.0 - 52.0 %    MCV 99.3 (H) 81.4 - 97.8 fL    MCH 32.5 27.0 - 33.0 pg    MCHC 32.7 (L) 33.7 - 35.3 g/dL    RDW 52.9 (H) 35.9 - 50.0 fL    Platelet Count 174 164 - 446 K/uL    MPV 10.7 9.0 - 12.9 fL   PROTHROMBIN TIME    Collection Time: 04/17/19  5:11 PM   Result Value Ref Range    PT 25.6 (H) 12.0 - 14.6 sec    INR 2.33 (H) 0.87 - 1.13   APTT    Collection Time: 04/17/19  5:11 PM   Result Value Ref Range    APTT 46.0 (H) 24.7 - 36.0 sec   COMP METABOLIC PANEL    Collection Time: 04/17/19  5:11 PM   Result Value Ref Range    Sodium 136 135 - 145 mmol/L    Potassium 4.9 3.6 - 5.5 mmol/L    Chloride 106 96 - 112 mmol/L    Co2 21 20 - 33  mmol/L    Anion Gap 9.0 0.0 - 11.9    Glucose 93 65 - 99 mg/dL    Bun 33 (H) 8 - 22 mg/dL    Creatinine 1.75 (H) 0.50 - 1.40 mg/dL    Calcium 8.7 8.5 - 10.5 mg/dL    AST(SGOT) 41 12 - 45 U/L    ALT(SGPT) 20 2 - 50 U/L    Alkaline Phosphatase 65 30 - 99 U/L    Total Bilirubin 0.7 0.1 - 1.5 mg/dL    Albumin 3.8 3.2 - 4.9 g/dL    Total Protein 6.1 6.0 - 8.2 g/dL    Globulin 2.3 1.9 - 3.5 g/dL    A-G Ratio 1.7 g/dL   ESTIMATED GFR    Collection Time: 19  5:11 PM   Result Value Ref Range    GFR If  44 (A) >60 mL/min/1.73 m 2    GFR If Non  36 (A) >60 mL/min/1.73 m 2   EKG (NOW)    Collection Time: 19  5:37 PM   Result Value Ref Range    Report       [facility] Emergency Dept.    Test Date:  2019  Pt Name:    SIXTO LUDWIG                Department: ER  MRN:        [id number]                      Room:       [facility]  Gender:     Male                         Technician: [id number]  :        [id number]                   Requested By:ER TRIAGE PROTOCOL  Order #:    [id number]                    Reading MD:    Measurements  Intervals                                Axis  Rate:       66                           P:          0  VA:         174                          QRS:        -59  QRSD:       122                          T:          111  QT:         460  QTc:        482    Interpretive Statements  A-V DUAL-PACED RHYTHM WITH SOME INHIBITION  NO FURTHER ANALYSIS ATTEMPTED DUE TO PACED RHYTHM  No previous ECG available for comparison     CBC with Differential: Tomorrow AM    Collection Time: 19  4:48 AM   Result Value Ref Range    WBC 6.4 4.8 - 10.8 K/uL    RBC 4.33 (L) 4.70 - 6.10 M/uL    Hemoglobin 14.0 14.0 - 18.0 g/dL    Hematocrit 41.4 (L) 42.0 - 52.0 %    MCV 95.6 81.4 - 97.8 fL    MCH 32.3 27.0 - 33.0 pg    MCHC 33.8 33.7 - 35.3 g/dL    RDW 50.1 (H) 35.9 - 50.0 fL    Platelet Count 160 (L) 164 - 446 K/uL    MPV 10.2 9.0 - 12.9 fL    Neutrophils-Polys  75.70 (H) 44.00 - 72.00 %    Lymphocytes 11.00 (L) 22.00 - 41.00 %    Monocytes 11.80 0.00 - 13.40 %    Eosinophils 0.90 0.00 - 6.90 %    Basophils 0.30 0.00 - 1.80 %    Immature Granulocytes 0.30 0.00 - 0.90 %    Nucleated RBC 0.00 /100 WBC    Neutrophils (Absolute) 4.80 1.82 - 7.42 K/uL    Lymphs (Absolute) 0.70 (L) 1.00 - 4.80 K/uL    Monos (Absolute) 0.75 0.00 - 0.85 K/uL    Eos (Absolute) 0.06 0.00 - 0.51 K/uL    Baso (Absolute) 0.02 0.00 - 0.12 K/uL    Immature Granulocytes (abs) 0.02 0.00 - 0.11 K/uL    NRBC (Absolute) 0.00 K/uL   Comp Metabolic Panel (CMP): Tomorrow AM    Collection Time: 04/18/19  4:48 AM   Result Value Ref Range    Sodium 134 (L) 135 - 145 mmol/L    Potassium 4.3 3.6 - 5.5 mmol/L    Chloride 106 96 - 112 mmol/L    Co2 21 20 - 33 mmol/L    Anion Gap 7.0 0.0 - 11.9    Glucose 91 65 - 99 mg/dL    Bun 30 (H) 8 - 22 mg/dL    Creatinine 1.63 (H) 0.50 - 1.40 mg/dL    Calcium 8.9 8.5 - 10.5 mg/dL    AST(SGOT) 34 12 - 45 U/L    ALT(SGPT) 18 2 - 50 U/L    Alkaline Phosphatase 61 30 - 99 U/L    Total Bilirubin 1.3 0.1 - 1.5 mg/dL    Albumin 3.7 3.2 - 4.9 g/dL    Total Protein 6.2 6.0 - 8.2 g/dL    Globulin 2.5 1.9 - 3.5 g/dL    A-G Ratio 1.5 g/dL   Prothrombin time (INR): Tomorrow AM    Collection Time: 04/18/19  4:48 AM   Result Value Ref Range    PT 16.1 (H) 12.0 - 14.6 sec    INR 1.28 (H) 0.87 - 1.13   MAGNESIUM    Collection Time: 04/18/19  4:48 AM   Result Value Ref Range    Magnesium 1.8 1.5 - 2.5 mg/dL   PHOSPHORUS    Collection Time: 04/18/19  4:48 AM   Result Value Ref Range    Phosphorus 2.6 2.5 - 4.5 mg/dL   ESTIMATED GFR    Collection Time: 04/18/19  4:48 AM   Result Value Ref Range    GFR If  48 (A) >60 mL/min/1.73 m 2    GFR If Non  39 (A) >60 mL/min/1.73 m 2       Fluids    Intake/Output Summary (Last 24 hours) at 04/18/19 1031  Last data filed at 04/18/19 0800   Gross per 24 hour   Intake           963.75 ml   Output             1200 ml   Net           -236.25 ml       Core Measures & Quality Metrics  Labs reviewed, Medications reviewed, Radiology images reviewed and EKG reviewed  Franklin catheter: No Franklin      DVT Prophylaxis: Not indicated at this time, ambulatory  DVT prophylaxis - mechanical: SCDs  Ulcer prophylaxis: Not indicated    Assessed for rehab: Patient returned to prior level of function, rehabilitation not indicated at this time    Total Score: 11    ETOH Screening     Intervention complete date: 4/18/2019  Patient response to intervention: CAGE negative.   Patient demonstrats understanding of intervention.Plan of care: No further acute intervention.    has not been contacted.      Assessment/Plan  Chronic anticoagulation- (present on admission)   Assessment & Plan    Chronic warfarin anticoagulation for atrial fibrillation.  Reversed with KCentra and Vitamin K on admission.  Trend INR.  4/18 Call placed to Neurosurgery, may resume Coumadin in 10 days (4/28).     Elevated serum creatinine- (present on admission)   Assessment & Plan    Admission serum creatinine 1.75.  Hydration.  4/18 Repeat serum creatinine 1.63.  Continue hydration and monitoring.     No contraindication to deep vein thrombosis (DVT) prophylaxis- (present on admission)   Assessment & Plan    Initial systemic anticoagulation contraindicated secondary to elevated bleeding risk.  RAP score 11.  4/18 Cleared for chemical DVT prophylaxis (Lovenox) per neurosurgery.  Ambulate TID.     Encounter for geriatric assessment- (present on admission)   Assessment & Plan    4/17 Geriatrics consult placed on admission.  Omega Zaragoza MD, Geriatrics.     Subarachnoid hemorrhage following injury, no loss of consciousness (HCC)- (present on admission)   Assessment & Plan    Small amount of subarachnoid hemorrhage in the sulci of the left frontal lobe. No mass effect or midline shift.  Warfarin anticoagulation reversed on admission.  Repeat interval CT imaging of the brain demonstrated  no significant change or progression.  Non-operative management.   No Keppra needed.  No follow up needed.  Luiz Ridley III, MD. Neurosurgery (sign off 4/17).     Paroxysmal atrial fibrillation (HCC)- (present on admission)   Assessment & Plan    Chronic condition treated with Diltiazem and Coumadin.  Diltiazem resumed on admission.  4/18 Call placed to Neurosurgery, may resume Coumadin in 10 days (4/28).     Essential hypertension- (present on admission)   Assessment & Plan    Chronic condition treated with losartan.  Resumed maintenance medication on admission.     Laceration of left ring finger- (present on admission)   Assessment & Plan    Suture repair in the Emergency Department.  Suture removal in 10-14 days.     Scalp laceration- (present on admission)   Assessment & Plan    Staple repair completed in Emergency Department.  Staple removal in 7-10 days.     Trauma- (present on admission)   Assessment & Plan    Mechanical ground-level fall on warfarin. Small traumatic subarachnoid hemorrhage.  Non Trauma Activation.  Omega Howard MD. Trauma Surgery.         Discussed patient condition with RN, Therapies, Patient and trauma surgery. Dr. Beltre

## 2019-04-18 NOTE — ASSESSMENT & PLAN NOTE
Initial systemic anticoagulation contraindicated secondary to elevated bleeding risk.  RAP score 11.  4/18 Cleared for chemical DVT prophylaxis (Lovenox) per neurosurgery, initiated.  Ambulate TID.

## 2019-04-18 NOTE — THERAPY
"Occupational Therapy Evaluation completed.   Functional Status:  Pt presents to skilled OT services following GLF at home resulting in SAH of L frontal lobe, non-op at this time. Pt received sitting up in chair performed STS with min a, ambulated in ICU with varying supervision to min a w/o AD, with couple of slight lob which pt corrected self with cga, completed oral care standing sink side with supervision, no gross ROM deficits noted. Pt is primary caregiver of spouse, reports have assist from dtr's with housekeeping and laundry, family check in with pt daily and they can assist with grocery shopping if needed. Pt will benefit from acute skilled OT services while in house to maximize pt's independence while in acute and anticipate will be able to d/c home with assist from family and HH services.   Plan of Care: Will benefit from Occupational Therapy 4 times per week  Discharge Recommendations:  Equipment: Will Continue to Assess for Equipment Needs. Post-acute therapy Recommend home health or outpatient transitional care services for continued occupational therapy services      See \"Rehab Therapy-Acute\" Patient Summary Report for complete documentation.    "

## 2019-04-18 NOTE — ASSESSMENT & PLAN NOTE
Chronic warfarin anticoagulation for atrial fibrillation.  Reversed with KCentra and Vitamin K on admission.  Trend INR.  4/18 Call placed to Neurosurgery, may resume Coumadin in 10 days (4/28).

## 2019-04-18 NOTE — ED PROVIDER NOTES
ED Provider Note    CHIEF COMPLAINT  Chief Complaint   Patient presents with   • GLF     Pt BIB EMS, MGLF. pt denies LOC. pt with laceration to head, left ring finger and right palm.    • Laceration       HPI  Primitivo Milan is a 89 y.o. male who presents after a fall.  The patient was taking the recycling out, and lost his balance, falling into a brick retaining wall.  He has a bit of pain over his scalp where he hit it.  He also cut up his right hand and his left ring finger.  Denies any syncope/loss of consciousness.  No neck pain.  No chest pain or shortness of breath.  No abdominal pain.  He does not think he has broken anything.  Of note, the patient is anticoagulated.    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 Lexa Lopes.    • Renal disorder 1970    stones   • Sleep apnea     Cannot use CPAP machine.   • Stroke (HCC) 1991   • TIA (transient ischemic attack)        FAMILY HISTORY  Family History   Problem Relation Age of Onset   • Heart Disease Mother    • Heart Attack Mother    • Heart Disease Father    • Heart Attack Father        SOCIAL HISTORY  Social History   Substance Use Topics   • Smoking status: Never Smoker   • Smokeless tobacco: Never Used   • Alcohol use No     He is here with his  daughter    SURGICAL HISTORY  Past Surgical History:   Procedure Laterality Date   • RECOVERY  5/19/2011    Performed by SURGERY, IR-RECOVERY at SURGERY SAME DAY [facility] ORS   • PACEMAKER INSERTION  September 2009    Medtronic Versa VEDR01 implanted by Dr. Lexa Lopes.   • EYE SURGERY  2006    left eye mass/ cancer/ s/p radiation   • LITHOTRIPSY  1995    left kidney x 3   • PROSTATECTOMY, RADICAL RETRO  1991   • EYE SURGERY         CURRENT MEDICATIONS    I have reviewed the nurses notes and/or the list brought with the patient.    ALLERGIES  Allergies   Allergen Reactions   • Iodine        REVIEW OF SYSTEMS  See HPI for further details. Review of systems as above, otherwise all other systems are negative.     PHYSICAL EXAM  VITAL SIGNS: Temp 36.6 °C (97.9 °F) (Temporal)   Wt 76 kg (167 lb 8.8 oz)   BMI 28.76 kg/m²     Constitutional: Well appearing patient in no acute distress.  Not toxic, nor ill in appearance.  HENT: Mucus membranes moist.  Oropharynx is clear.  There is a 4 cm laceration over his right temple.  No significant surrounding contusion  Eyes: Pupils equally round.  No scleral icterus.   Neck: Full nontender range of motion.  Lymphatic: No cervical lymphadenopathy noted.   Cardiovascular: Regular heart rate and rhythm.  No murmurs, rubs, nor gallop appreciated.   Thorax & Lungs: Chest is nontender.  Lungs are clear to auscultation with good air movement bilaterally.  No wheeze, rhonchi, nor rales.   Abdomen: Soft, with no tenderness, rebound nor guarding.  No mass, pulsatile mass, nor hepatosplenomegaly appreciated.  Skin: Some scattered bruises.  He has an abrasion over his right palm with a superficial sloughing of skin..  As above and below  Extremities/Musculoskeletal: There is a 2 cm laceration over his right ring finger on the volar proximal phalanx, avulsion, as well as a ring on his finger which we had to cut off.  Neurologic: Alert & oriented.  Strength and sensation is intact all around.    Psychiatric: Normal affect appropriate for the clinical situation.    EKG  I interpreted this EKG myself.  This is a 12-lead study.  The rhythm appears to be atrial pacing with a rate of 66.  There are no ST segment nor T wave abnormalities.  Interpretation: No ST segment elevation myocardial infarction..    LABS  Labs Reviewed   CBC WITHOUT DIFFERENTIAL - Abnormal; Notable for the following:        Result Value    RBC 4.43 (*)     MCV 99.3 (*)     MCHC 32.7 (*)     RDW 52.9 (*)     All other components within normal limits    Narrative:     Indicate which anticoagulants the patient is on:->UNKNOWN   PROTHROMBIN TIME - Abnormal; Notable for the following:     PT 25.6 (*)     INR 2.33 (*)     All other components within normal limits    Narrative:     Indicate which anticoagulants the patient is on:->UNKNOWN   APTT - Abnormal; Notable for the following:     APTT 46.0 (*)     All other components within normal limits    Narrative:     Indicate which anticoagulants the patient is on:->UNKNOWN   COMP METABOLIC PANEL - Abnormal; Notable for the following:     Bun 33 (*)     Creatinine 1.75 (*)     All other components within normal limits    Narrative:     Indicate which anticoagulants the patient is on:->UNKNOWN   ESTIMATED GFR - Abnormal; Notable for the following:     GFR If  44 (*)     GFR If Non  36 (*)     All other components within normal limits    Narrative:     Indicate which anticoagulants the patient is on:->UNKNOWN         RADIOLOGY/PROCEDURES  I have reviewed the patient's film interpretations myself, and they are read out by the radiologist as:   CT-HEAD W/O   Final Result      1.  Small amount of subarachnoid hemorrhage in the sulci of the left frontal lobe. No mass effect or midline shift.      2.  Diffuse atrophy and periventricular white matter change, consistent with chronic small vessel disease.      3.  Calcified extra-axial mass in the anterior right middle cranial  fossa is consistent with a meningioma            Comment: Results discussed with Dr. Benson at 7:35 PM      CT-HEAD W/O    (Results Pending)     .    Procedure note, wound repair  I obtained verbal informed consent.  His scalp wound was anesthetized with a total of 8 cc 2% lidocaine with epinephrine.  The wound on his left hand was anesthetized with 3 cc of 1% lidocaine without epinephrine.  Wounds were all prepped with Betadine.  Both wounds are explored, there is no foreign body seen.  The scalp wound is closed with 9 staples.  The finger is closed with 4 4-0 nylon sutures in a simple interrupted fashion.    COURSE & MEDICAL DECISION MAKING  I have reviewed any medical record information, laboratory studies and radiographic results as noted above.  Patient presents after a fall, striking his head.  He has a laceration which has been closed, on his head and his finger.  Because of his anticoagulation, and a head CT was obtained.  Results of this show a small amount of subarachnoid blood.  I discussed the patient's case with the pharmacist, we will be reversing him.  Because of this the patient was upgraded as a trauma protocol, I discussed the patient's case with Dr. Howard he will be watching him in the ICU.  Also discussed with Dr. Ridley from neurosurgery who will be following along.        FINAL IMPRESSION  1. SAH (subarachnoid hemorrhage) (HCC)    2. Laceration of scalp, initial encounter    3. Laceration of left ring finger without foreign body without damage to nail, initial encounter    4. Anticoagulated    5. Fall, initial encounter           This dictation was created using voice recognition software.    Electronically signed by: Aakash Benson, 4/17/2019 6:10 PM

## 2019-04-18 NOTE — ASSESSMENT & PLAN NOTE
Admission serum creatinine 1.75.  Hydration.  4/18 Repeat serum creatinine 1.63.  Continue hydration and monitoring.

## 2019-04-19 NOTE — PROGRESS NOTES
Trauma / Surgical Daily Progress Note    Date of Service  4/19/2019    Chief Complaint  89 y.o. male admitted 4/17/2019 with Trauma    Interval Events  Possible DC today  -awaiting home health to be set up    Ambulate    PT/OT    Up to chair for meals     Review of Systems  Review of Systems   Constitutional: Negative for chills and fever.   HENT: Positive for hearing loss (premorbid, hearing aides in place).    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 Negative for blurred vision.   Respiratory: Negative for shortness of breath.    Cardiovascular: Negative for chest pain.   Gastrointestinal: Negative for abdominal pain, nausea and vomiting.        Last bowel movement 4/18   Genitourinary: Negative for dysuria.        Voiding   Musculoskeletal: Negative for back pain, joint pain, myalgias and neck pain.   Skin: Negative for rash.   Neurological: Negative for dizziness, speech change, focal weakness and headaches.   Psychiatric/Behavioral: Negative for substance abuse.        Vital Signs  Temp:  [36.3 °C (97.4 °F)-36.8 °C (98.2 °F)] 36.6 °C (97.8 °F)  Pulse:  [60-71] 68  Resp:  [16-26] 18  BP: (116-136)/(60-76) 133/70  SpO2:  [92 %-96 %] 92 %    Physical Exam  Physical Exam   Constitutional: He is oriented to person, place, and time. He appears well-developed and well-nourished. No distress.   HENT:   Head: Normocephalic.   Right parietal/occipital staples well approximated   Eyes: Conjunctivae are normal.   Left pupil slightly larger than right, unchanged since admission   Neck: Normal range of motion. Neck supple. No JVD present. No tracheal deviation present.   Cardiovascular: Normal rate.  An irregularly irregular rhythm present.   Murmur heard.  Pulmonary/Chest: Effort normal. No respiratory distress. He exhibits no tenderness.   Abdominal: Soft. He exhibits no distension. There is no tenderness. There is no guarding.    Musculoskeletal:   Moves all extremities   Neurological: He is alert and oriented to person, place, and time. GCS eye subscore is 4. GCS verbal subscore is 5. GCS motor subscore is 6.   Skin: Skin is warm and dry.   Left ring finger sutures intact and approximated well   Psychiatric: He has a normal mood and affect. His behavior is normal.   Nursing note and vitals reviewed.      Laboratory  Recent Results (from the past 24 hour(s))   Basic Metabolic Panel    Collection Time: 04/19/19  4:25 AM   Result Value Ref Range    Sodium 138 135 - 145 mmol/L    Potassium 4.4 3.6 - 5.5 mmol/L    Chloride 109 96 - 112 mmol/L    Co2 22 20 - 33 mmol/L    Glucose 93 65 - 99 mg/dL    Bun 33 (H) 8 - 22 mg/dL    Creatinine 1.64 (H) 0.50 - 1.40 mg/dL    Calcium 9.3 8.5 - 10.5 mg/dL    Anion Gap 7.0 0.0 - 11.9   CBC WITH DIFFERENTIAL    Collection Time: 04/19/19  4:25 AM   Result Value Ref Range    WBC 5.2 4.8 - 10.8 K/uL    RBC 4.45 (L) 4.70 - 6.10 M/uL    Hemoglobin 14.4 14.0 - 18.0 g/dL    Hematocrit 42.8 42.0 - 52.0 %    MCV 96.2 81.4 - 97.8 fL    MCH 32.4 27.0 - 33.0 pg    MCHC 33.6 (L) 33.7 - 35.3 g/dL    RDW 50.3 (H) 35.9 - 50.0 fL    Platelet Count 157 (L) 164 - 446 K/uL    MPV 10.4 9.0 - 12.9 fL    Neutrophils-Polys 62.20 44.00 - 72.00 %    Lymphocytes 18.50 (L) 22.00 - 41.00 %    Monocytes 15.20 (H) 0.00 - 13.40 %    Eosinophils 3.10 0.00 - 6.90 %    Basophils 0.60 0.00 - 1.80 %    Immature Granulocytes 0.40 0.00 - 0.90 %    Nucleated RBC 0.00 /100 WBC    Neutrophils (Absolute) 3.23 1.82 - 7.42 K/uL    Lymphs (Absolute) 0.96 (L) 1.00 - 4.80 K/uL    Monos (Absolute) 0.79 0.00 - 0.85 K/uL    Eos (Absolute) 0.16 0.00 - 0.51 K/uL    Baso (Absolute) 0.03 0.00 - 0.12 K/uL    Immature Granulocytes (abs) 0.02 0.00 - 0.11 K/uL    NRBC (Absolute) 0.00 K/uL   Prothrombin time (INR): Tomorrow AM    Collection Time: 04/19/19  4:25 AM   Result Value Ref Range    PT 15.7 (H) 12.0 - 14.6 sec    INR 1.23 (H) 0.87 - 1.13   ESTIMATED GFR     Collection Time: 04/19/19  4:25 AM   Result Value Ref Range    GFR If  47 (A) >60 mL/min/1.73 m 2    GFR If Non  39 (A) >60 mL/min/1.73 m 2       Fluids  No intake or output data in the 24 hours ending 04/19/19 0909    Core Measures & Quality Metrics  Labs reviewed, Medications reviewed, Radiology images reviewed and EKG reviewed  Franklin catheter: No Franklin      DVT Prophylaxis: Not indicated at this time, ambulatory  DVT prophylaxis - mechanical: SCDs  Ulcer prophylaxis: Not indicated    Assessed for rehab: Patient returned to prior level of function, rehabilitation not indicated at this time    Total Score: 11    ETOH Screening     Intervention complete date: 4/18/2019  Patient response to intervention: CAGE negative.   Patient demonstrats understanding of intervention.Plan of care: No further acute intervention.    has not been contacted.      Assessment/Plan  Chronic anticoagulation- (present on admission)   Assessment & Plan    Chronic warfarin anticoagulation for atrial fibrillation.  Reversed with KCentra and Vitamin K on admission.  Trend INR.  4/18 Call placed to Neurosurgery, may resume Coumadin in 10 days (4/28).     Elevated serum creatinine- (present on admission)   Assessment & Plan    Admission serum creatinine 1.75.  Hydration.  4/18 Repeat serum creatinine 1.63.  Continue hydration and monitoring.     No contraindication to deep vein thrombosis (DVT) prophylaxis- (present on admission)   Assessment & Plan    Initial systemic anticoagulation contraindicated secondary to elevated bleeding risk.  RAP score 11.  4/18 Cleared for chemical DVT prophylaxis (Lovenox) per neurosurgery, initiated.  Ambulate TID.     Encounter for geriatric assessment- (present on admission)   Assessment & Plan    4/17 Geriatrics consult placed on admission.  Omega Zaragoza MD, Geriatrics.     Subarachnoid hemorrhage following injury, no loss of consciousness (HCC)- (present on  admission)   Assessment & Plan    Small amount of subarachnoid hemorrhage in the sulci of the left frontal lobe. No mass effect or midline shift.  Warfarin anticoagulation reversed on admission.  Repeat interval CT imaging of the brain demonstrated no significant change or progression.  Non-operative management.   No Keppra needed.  No follow up needed.  Luiz Ridley III, MD. Neurosurgery (sign off 4/17).     Paroxysmal atrial fibrillation (HCC)- (present on admission)   Assessment & Plan    Chronic condition treated with Diltiazem and Coumadin.  Diltiazem resumed on admission.  4/18 Call placed to Neurosurgery, may resume Coumadin in 10 days (4/28).     Essential hypertension- (present on admission)   Assessment & Plan    Chronic condition treated with losartan.  Resumed maintenance medication on admission.     Discharge planning issues- (present on admission)   Assessment & Plan    Date of admission: 4/17/2019  Date: 4/18 Transfer orders from SICU  Date: NA Rehab/SNF consult   Cleared for discharge: No  Discharge delayed: No    Discharge date: NA     Laceration of left ring finger- (present on admission)   Assessment & Plan    Suture repair in the Emergency Department.  Suture removal in 10-14 days.     Scalp laceration- (present on admission)   Assessment & Plan    Staple repair completed in Emergency Department.  Staple removal in 7-10 days.     Trauma- (present on admission)   Assessment & Plan    Mechanical ground-level fall on warfarin. Small traumatic subarachnoid hemorrhage.  Non Trauma Activation.  Omega Howard MD. Trauma Surgery.         Discussed patient condition with RN, Patient and trauma surgery.    Patient seen, data reviewed and discussed.  Agree with assessment and plan.         John Fernandez MD  [phone]

## 2019-04-19 NOTE — PROGRESS NOTES
1430 pt arrived from ICU in stable condition skin check performed safety protocol explained and went over POC.

## 2019-04-19 NOTE — DISCHARGE INSTRUCTIONS
Discharge Instructions    Discharged to home by car with relative. Discharged via wheelchair, hospital escort: Yes.  Special equipment needed: Walker    Be sure to schedule a follow-up appointment with your primary care doctor or any specialists as instructed.     Discharge Plan:   Diet Plan: Discussed  Activity Level: Discussed  Confirmed Follow up Appointment: Patient to Call and Schedule Appointment  Confirmed Symptoms Management: Discussed  Influenza Vaccine Indication: Patient Refuses    I understand that a diet low in cholesterol, fat, and sodium is recommended for good health. Unless I have been given specific instructions below for another diet, I accept this instruction as my diet prescription.   Other diet: Regular    Special Instructions:     -Resume coumadin on 4/27- follow up with your coumadin clinic   - Call or seek medical attention for questions or concerns   - Follow up with Dr. Howard in 1 weeks time   - Stitches out on 4/27- Can have PCP do it, urgent care walk in or call Dr. Howard's office to schedule removal.   - Follow up with primary care provider within one weeks time   - Resume regular diet   - May take over the counter acetaminophen   - Continue daily over the counter stool softener while on narcotics   - No operation of machinery or motorized vehicles while under the influence of narcotics   - No alcohol, marijuana or illicit drug use while under the influence of narcotics   - No swimming, hot tubs, baths or wound submersion until cleared by outpatient provider. May shower   - No contact sports, strenuous activities, or heavy lifting until cleared by outpatient provider   - If respiratory decompensation, increased confusion, or signs or symptoms of infection occur seek medical attention    · Is patient discharged on Warfarin / Coumadin?   No     Depression / Suicide Risk    As you are discharged from this [facility] facility, it is important to learn how to keep safe from harming  yourself.    Recognize the warning signs:  · Abrupt changes in personality, positive or negative- including increase in energy   · Giving away possessions  · Change in eating patterns- significant weight changes-  positive or negative  · Change in sleeping patterns- unable to sleep or sleeping all the time   · Unwillingness or inability to communicate  · Depression  · Unusual sadness, discouragement and loneliness  · Talk of wanting to die  · Neglect of personal appearance   · Rebelliousness- reckless behavior  · Withdrawal from people/activities they love  · Confusion- inability to concentrate     If you or a loved one observes any of these behaviors or has concerns about self-harm, here's what you can do:  · Talk about it- your feelings and reasons for harming yourself  · Remove any means that you might use to hurt yourself (examples: pills, rope, extension cords, firearm)  · Get professional help from the community (Mental Health, Substance Abuse, psychological counseling)  · Do not be alone:Call your Safe Contact- someone whom you trust who will be there for you.  · Call your local CRISIS HOTLINE 117-8735 or 777-135-8063  · Call your local Children's Mobile Crisis Response Team [place] (995) 262-6330 or www.[place]  · Call the toll free National Suicide Prevention Hotlines   · National Suicide Prevention Lifeline 440-876-BLVC (9285)  · National Hope Line Network 800-SUICIDE (911-8252)

## 2019-04-19 NOTE — DISCHARGE SUMMARY
"Trauma Discharge Summary    DATE OF ADMISSION: 4/17/2019    DATE OF DISCHARGE: 4/19/2019    ATTENDING PHYSICIAN: Omega Howard M.D.    CONSULTING PHYSICIAN:   1. Omega Zaragoza MD geriatrics  2.  Luiz Ridley III, MD, surgery neurosurgery    DISCHARGE DIAGNOSIS:  1.  Chronic anticoagulation  2.  Proximal A. fib  3.  Presence of pulmonary pacemaker  4.  Essential hypertension  5.  Subarachnoid hemorrhage following injury with no loss of consciousness  6.  Elevated serum creatinine  7.  Trauma  8.  Scalp laceration  9.  Laceration of left ring finger      PROCEDURES:  1.  None    HISTORY OF PRESENT ILLNESS: The patient is a 89 y.o. man involved in a ground-level fall.  He was subsequently transferred to [facility] for definite trauma care.  He was triaged as a Trauma in accordance with established pre-hospital protocols.    HOSPITAL COURSE: On arrival, he underwent extensive radiographic and laboratory studies and was admitted to the critical care team under the direction and supervision of Dr. Howard.  He sustained the listed injuries and incurred the listed diagnosis during his stay.    He was transferred from the emergency department to the trauma ICU.    He was diagnosed with subarachnoid hemorrhage of the cell jocy left frontal lobe.  No mass-effect or midline shift.  His warfarin\" was reversed on admission.  Repeat interval CT image of the brain demonstrate no sniffing change or progression.  This is treated nonoperatively no Keppra was needed no follow-up was needed.  Neurosurgery signed off on April 17, 2019.    Patient was followed for geriatric assessment we appreciate Dr. Garcia care ideas.  Patient did have an elevated serum creatinine his admission creatinine was 1.75.  History of hydration with repeat serum creatinine improving.    Patient was treated for his essential hypertension with his home medications resumed.    Patient has a history of chronic proximal atrial fibrillation to " which he is on diltiazem.  He can resume his Coumadin in 10 days and should follow-up with his local Coumadin clinic regarding this.    Patient was a trauma he was a ground-level fall.  He did suffer a laceration of his left ring finger and left scalp.  He was sutured in the emergency room and staples were placed in his scalp wound.  He should have the staples and sutures removed in 7 to 10 days.      He was medically stable for transfer to the neurosurgical  [facility] on April 18, 2018 where a tertiary exam was performed.    On the day of discharge his family is there to help him and provide the support as recommended by speech therapy.  He will follow-up with his primary care doctor and return to Coumadin 10 days after injury and follow-up with Coumadin clinic regarding this.    DISCHARGE PHYSICAL EXAM: See [facility] physical exam dated 4/19/2019    DISCHARGE MEDICATIONS:  I reviewed the patients controlled substance history and obtained a controlled substance use informed consent (if applicable) provided by [facility] and the patient has been prescribed.       Medication List      CONTINUE taking these medications      Instructions   calcium citrate 950 MG Tabs  Commonly known as:  CALCITRATE   Take 950 mg by mouth every day.  Dose:  950 mg     DILTIAZem  MG Cp24  Commonly known as:  CARDIZEM CD   TAKE 1 CAP BY MOUTH EVERY DAY.  Dose:  240 mg     losartan 25 MG Tabs  Commonly known as:  COZAAR   TAKE 1 TABLET BY MOUTH EVERY DAY     Magnesium 400 MG Tabs   Take 400 mg by mouth every day.  Dose:  400 mg     vitamin D 1000 UNIT Tabs  Commonly known as:  cholecalciferol   Take 2,000 Units by mouth every day.  Dose:  2000 Units        STOP taking these medications    warfarin 5 MG Tabs  Commonly known as:  COUMADIN            DISPOSITION: The patient will be discharged home in stable condition on April 19, 2018..  He will follow up with his primary care doctor in 1 week    The patient has and family  have been extensively counseled and all questions have been answered. Special attention was paid to respiratory decompensation, when to resume Coumadin, home recommendations for safe discharge and signs and symptoms of infection and to seek immediate medical attention if these develop. The patient demonstrates understanding and gives verbal compliance with discharge instructions.    TIME SPENT ON DISCHARGE: 35 minutes      ____________________________________________  COLLEEN Andrews.    DD: 4/19/2019 1:12 PM

## 2019-04-19 NOTE — DISCHARGE PLANNING
Anticipated Discharge Disposition:   Home with homehealth  [facility] has accepted.     Action:   Met with pt.   He lives with wife but daughters check on them everyday.   He said that he is very active and still drives to grocery store and to his meetings. He has no dc concerns. He was agreeable to homehealth set up with [facility]. Faxed to [facility].    Barriers to Discharge:   none    Plan:   Dc home.

## 2019-04-19 NOTE — PROGRESS NOTES
"Geriatric Progress Note:  4/19/2019  Chief Complaint   Patient presents with   • GLF     Pt BIB EMS, [facility]. pt denies LOC. pt with laceration to head, left ring finger and right palm.    • Laceration       S: Patient transferred out of ICU, doing well.  Patient reports to me a recent Istria of left lower extremity infection that was treated with 7 days of a pill which passed improvement.  This directly preceded his fall.  He is able again to report to me the issues regarding his fall.  He denies any confusion constipation.  He had gone to the bathroom this morning.  He did say that he did take some time to get some help when calling for nursing light.  Patient endorsed some pain near his suture finger  ROS: a 14 pt ROS was negative other than as stated above.     O:/70   Pulse 68   Temp 36.6 °C (97.8 °F) (Temporal)   Resp 18   Ht 1.676 m (5' 6\")   Wt 74.3 kg (163 lb 12.8 oz)   SpO2 92%   BMI 26.44 kg/m²   Head: Traumatic  Delirium screen: Negative, all 4 feet  Cardiovascular: Regular rate and rhythm 2+ radial pulses bilaterally, murmur heard 3-6 best heard at the apex with radiation to axilla, systolic   extremities: Arthritic changes noted to hands bilateral  Abdomen: Soft nontender numbness to  Neck: Supple  Psych: Does not appear to be responding to internal stimuli    Labs/Imaging/EKG: CBC BMP reviewed    Assessment: 89-year-old male admitted after a ground-level fall and subsequent traumatic subarachnoid hemorrhage.        Plan:  Fall  -The main risk factor related to the patient's fall is likely proximal lower extremity weakness.  At discharge I would recommend either outpatient PT or home health PT to help the patient improve his lower extremity strength, as well as a home safety evaluation to minimize any hazards that may increase his fall risk.  His medications do not appear to obviously be adversely affecting his position.  This is his only fall within the last year.  -Strongly encourage " patient to work on his proximal lower extremity strength to minimize his fall risk, he will discuss with PT, home health    Traumatic subarachnoid hemorrhage  Scalp laceration  Laceration of left ring finger  Management per trauma neurosurgeon  Per recent note staple removal and resume with anticoagulation around 10 days     History of dual AV node pacemaker placement  Hypertension  Atrial fibrillation  -Recommend to continue home losartan and diltiazem  -Hold anticoagulation  per neurosurgery, trauma     CKD  -His creatinine appears to be at baseline, encourage oral intake     Disposition  -Patient can likely be discharged when cleared by trauma, mobilizing him while he is in house will be the most beneficial thing we can do for him to ensure that he can be discharged home.  Discussed mobilization with nursing    Patient will hopefully get discharged later today over this weekend.  Geriatric will sign off please do not hesitate to contact us if there are any medical questions    Omega Zaragoza M.D.  Geriatric Physician   Can be reached at extension: [phone]  Monday - Friday 8-5      This note was partially complete completed using voice recognition software.  I did my best to correct errors prior to submitting this note, but for any concerns please do not hesitate to contact me.

## 2019-04-19 NOTE — THERAPY
"Physical Therapy Treatment completed.   Bed Mobility:  Supine to Sit: Supervised  Transfers: Sit to Stand: Supervised  Gait: Level Of Assist: Minimal Assist with No AD, Front-Wheel Walker       Plan of Care: Will benefit from Physical Therapy 5 times per week  Discharge Recommendations: Equipment: Front-Wheel Walker.     See \"Rehab Therapy-Acute\" Patient Summary Report for complete documentation.     Pt progressing well w/ therapy. Pt was able to perform most mobility at a SPV level. Pt requiring Osmel for gait w/out the AD. He was unable to look up and when he did had a significant LOB that required therapist assist. With the FWW, pt was able to perform gait at a SPV level. He was able to look up during gait w/out a LOB. As per initial eval, pt will benefit from post acute therapy.  "

## 2019-04-19 NOTE — CARE PLAN
Problem: Safety  Goal: Will remain free from injury  Outcome: PROGRESSING AS EXPECTED  Pt calls for assistance  Close to nurses station

## 2019-04-19 NOTE — DISCHARGE PLANNING
Care Transition Team Assessment    Information Source  Orientation : Oriented x 4  Information Given By: Patient  Who is responsible for making decisions for patient? : Patient         Elopement Risk  Legal Hold: No  Ambulatory or Self Mobile in Wheelchair: Yes  Disoriented: No  Psychiatric Symptoms: None  History of Wandering: No  Elopement this Admit: No  Vocalizing Wanting to Leave: No  Displays Behaviors, Body Language Wanting to Leave: No-Not at Risk for Elopement  Elopement Risk: Not at Risk for Elopement    Interdisciplinary Discharge Planning  Does Admitting Nurse Feel This Could be a Complex Discharge?: No  Primary Care Physician: Dr. Steven Anderson  Lives with - Patient's Self Care Capacity: Spouse  Patient or legal guardian wants to designate a caregiver (see row info): No  Support Systems: Spouse / Significant Other  Housing / Facility: [address]  Do You Take your Prescribed Medications Regularly: Yes  Able to Return to Previous ADL's: Yes  Mobility Issues: Yes  Prior Services: None  Patient Expects to be Discharged to:: Home with [facility]  Assistance Needed: No  Durable Medical Equipment: Not Applicable    Discharge Preparedness  What is your plan after discharge?: Home with help, Home health care  What are your discharge supports?: Child, Spouse  Prior Functional Level: Ambulatory, Drives Self, Independent with Activities of Daily Living, Independent with Medication Management  Difficulity with ADLs: None  Difficulity with IADLs: None    Functional Assesment  Prior Functional Level: Ambulatory, Drives Self, Independent with Activities of Daily Living, Independent with Medication Management    Finances  Financial Barriers to Discharge: No  Prescription Coverage: Yes    Vision / Hearing Impairment  Right Eye Vision: Wears Glasses  Left Eye Vision: Wears Glasses  Hearing Impairment : Yes  Hearing Impairment: Both Ears, Hearing Device(s) Available              Domestic Abuse  Have you ever been the victim of  abuse or violence?: No    Psychological Assessment  History of Substance Abuse: None         Anticipated Discharge Information  Anticipated discharge disposition: Home

## 2019-04-19 NOTE — PROGRESS NOTES
Pt given discharge instructions, Daughter and pt. Gave verbal understanding, pt discharged via WC with hospital escort, all belongings taken, all questions answered.

## 2019-04-19 NOTE — DISCHARGE PLANNING
Received Choice form at [id number].   Agency/Facility Name: [facility]   Referral sent per Choice form at 1000.

## 2019-04-19 NOTE — THERAPY
"Occupational Therapy Treatment completed with focus on ADLs, ADL transfers and patient education.  Functional Status:  Supervision supine to sit.  Pt donned pants EOB supervised.  Pt stood and attempted to walk to bathroom without AD and had LOB requiring assist to regain balance.  Pt much more steady when using FWW.  Pt completed toilet transfer supervised.  Plan of Care: Will benefit from Occupational Therapy 4 times per week  Discharge Recommendations:  Equipment Front-Wheel Walker. Post-acute therapy:  Home Health    Pt seen for OT tx. Pt to DC home today with support from daughters. Discussed home safety, ADL's, and importance of use of FWW at this time. Pt will continue to benefit from acute OT services while he remains in house.    See \"Rehab Therapy-Acute\" Patient Summary Report for complete documentation.   "

## 2019-04-19 NOTE — FACE TO FACE
Face to Face Supporting Documentation - Home Health    The encounter with this patient was in whole or in part the primary reason for home health admission.    Date of encounter:   Patient:                    MRN:                       YOB: 2019  Primitivo Milan  [id number]  9/25/1921     Home health to see patient for:  Skilled Nursing care for assessment, interventions & education and Physical Therapy evaluation and treatment    Skilled need for:  New Onset Medical Diagnosis ICH    Skilled nursing interventions to include:  Comment: home assessment    Homebound status evidenced by:  Needs the assistance of another person in order to leave the home. Leaving home requires a considerable and taxing effort. There is a normal inability to leave the home.    Community Physician to provide follow up care: Aj Alfaro M.D.     Optional Interventions? No      I certify the face to face encounter for this home health care referral meets the CMS requirements and the encounter/clinical assessment with the patient was, in whole, or in part, for the medical condition(s) listed above, which is the primary reason for home health care. Based on my clinical findings: the service(s) are medically necessary, support the need for home health care, and the homebound criteria are met.  I certify that this patient has had a face to face encounter by myself.  COLLEEN Andrews. - NPI: [id number]

## 2019-04-19 NOTE — DISCHARGE PLANNING
ATTN: Case Management  RE: Referral for Home Health    As of 4/19/19, we have accepted the Home Health referral for the patient listed above.    A [facility] clinician will be out to see the patient within 48 hours. If you have any questions or concerns regarding the patient’s transition to Home Health, please do not hesitate to contact us at [phone].      We look forward to collaborating with you,  [facility] Home Health Team

## 2019-04-22 NOTE — PROGRESS NOTES
Medications reviewed.  Note discharge summary to compare his home medications with.  The home health note mentioned that he might be restarting warfarin.  So I put in a INR order for home health to draw his next INR on 2019.  Warfarin is not currently on his med list-but he does have a history of atrial fibrillation and a TIA.  He was on warfarin in the past and is a patient of ours.    Anticoagulation Summary  As of 2019    INR goal:   2.0-3.0   TTR:   80.1 % (3.9 y)   INR used for dosin.23! (2019)   Warfarin maintenance plan:   7.5 mg (5 mg x 1.5) every Wed; 5 mg (5 mg x 1) all other days   Weekly warfarin total:   37.5 mg   Plan last modified:   Ibeth Romero PharmD (2018)   Next INR check:   2019   Target end date:   Indefinite    Indications    Paroxysmal atrial fibrillation (HCC) [I48.0]  Hx-TIA (transient ischemic attack) [Z86.73]             Anticoagulation Episode Summary     INR check location:   Outside Lab    Preferred lab:       Send INR reminders to:       Comments:   [phone]      Anticoagulation Care Providers     Provider Role Specialty Phone number    Taz Jc M.D. Referring Cardiology [phone]    Jean Ayala, PharmD Responsible          Anticoagulation Patient Findings      He is currently off warfarin for at least 10 days due to a recent hospitalization due to a fall and intracranial hemorrhage.  They will remain off of warfarin until they are instructed to resume by their PCP and/or neurologist.      Colton Gonzalez, MarionD

## 2019-04-23 NOTE — PROGRESS NOTES
4/23/19 1:15pm:  ÁNGELA post discharge outreach call first attempt. CM attempted to reach pt at home number as well as daughter's number.  CM left  requesting return call to  at [phone].

## 2019-04-23 NOTE — PROGRESS NOTES
SCP post discharge med rec completed. Spoke to patient's daughter No clinically significant medication issues noted. Patient denies any side effects, barriers to accessing medications, or trouble with adherence.   Does not feel his recent fall was medication related. Mechanical fall from taking a heavy load of newspapers to the recycling bin. Did not  pain medicine as he did not feel he needs it. No further medication related issues noted at this time.

## 2019-04-25 NOTE — PROGRESS NOTES
Subjective:     Primitivo Milan is a 89 y.o. male who presents for Hospital Follow-up.  Chart and discharge summary reviewed.   Date of discharge/19/19.  48- hour post discharge RN call completed on 4/23/2019 and documented in the medical record by Dunia Aviles RN:   POST DISCHARGE CALL:  Discharge Date:4/19/2019   Date of Outreach Call: 4/23/2019  1:24 PM  Now that you're home, how are you doing? Good  Comment:Dtr answered telephone. Reports patient is doing  good since discharge. Dtr has no concerns at this time.  Do you have questions about your medications? No    Did you fill your medications? No  Comment:Dtr reports no new medications    Do you have a follow-up appointment scheduled?Yes  Comment:Dtr will bring pt to appt on 4/25    Discharging Department: Neurosurgery    Number of Attempts: 2  Current or previous attempts completed within two business days of discharge? Yes  Provided education regarding treatment plan, medication, self-management, ADLs? Yes  Has patient completed Advance Directive? If yes, advise them to bring to appointment. No  Care Manager phone number provided? Yes  Is there anything else I can help you with? No        HPI: The patient is a 89-year-old white male with a history of hypertension, moderate aortic stenosis, pacemaker, chronic kidney disease, paroxysmal atrial fibrillation on chronic anticoagulation.  He was recently hospitalized after a ground-level fall.  He suffered a subarachnoid hemorrhage as well as a laceration scalp and a laceration of his left ring finger.  He was evaluated by neurosurgery, geriatrics, general surgery.  His subarachnoid hemorrhage caused no mass-effect or midline shift and did not progress.  Therefore he did not have need for continuing neurosurgery management.  He was taken off Coumadin temporarily but is scheduled to restart 10 days after his injury.  He has a new surgery follow-up scheduled with Dr. Howard in a few days.  Home health was  arranged and includes physical therapy.  He is using a walker at home.    Since returning home, patient reports feeling good.     The patient has questions regarding hospitalization or discharge: All questions were answered  The patient has a little weakness; denies difficulty taking care of self at home.  He lives with his wife and daughter.  Patient reports taking medications as instructed.    Current Outpatient Prescriptions   Medication Sig Dispense Refill   • Cetirizine HCl 10 MG Cap Take 1 tablet by mouth every day at 6 PM.     • Cholecalciferol (VITAMIN D3) 5000 units Tab Take 5,000 Units by mouth every day at 6 PM.     • losartan (COZAAR) 25 MG Tab TAKE 1 TABLET BY MOUTH EVERY DAY 90 Tab 1   • Magnesium 400 MG Tab Take 400 mg by mouth every day.     • DILTIAZem CD (CARDIZEM CD) 240 MG CAPSULE SR 24 HR TAKE 1 CAP BY MOUTH EVERY DAY. 90 Cap 3     No current facility-administered medications for this visit.        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 Lexa Lopes.    • Renal disorder 1970    stones   • Sleep apnea     Cannot use CPAP machine.   • Stroke (HCC) 1991   • TIA (transient ischemic attack)        Surgical History  Past Surgical History:   Procedure Laterality Date   • RECOVERY  5/19/2011    Performed by SURGERY, IR-RECOVERY at SURGERY SAME DAY [facility]   • PACEMAKER INSERTION  September 2009    Medtronic Versa [id number] implanted by Dr. Lexa Lopes.   • EYE SURGERY  2006    left eye mass/ cancer/ s/p radiation   • LITHOTRIPSY  1995    left kidney x 3   • PROSTATECTOMY, RADICAL RETRO  1991   • EYE SURGERY         ROS:    Constitutional:  Denies fever, chills, night sweats, fatigue or malaise  HENT: Denies head congestion, ear pain or drainage, decreased hearing, sore throat  Eyes: Denies visual changes, eye drainage or redness, eye pain  Neck: Denies pain, swollen glands, decreased range of motion  Lungs: Denies shortness of breath, wheezing, cough  Cardiovascular: Denies chest pain, orthopnea, lower extremity edema, palpitations  Abdominal: Denies abdominal pain, change in bowel or bladder habits, nausea or vomiting  Musculoskeletal: Denies back pain  Endocrine: Denies unexplained weight changes, heat or cold intolerance, hair loss, polyuria or polydipsia  Neurological: Denies dizziness, headache, confusion, focal weakness or numbness, memory loss  Psychiatric: Denies depression, anxiety, insomnia       Objective:     /70 (BP Location: Left arm, Patient Position: Sitting)   Pulse 87   Temp 36.6 °C (97.9 °F)   Ht 1.626 m (5' 4\")   Wt 73.9 kg (163 lb)   SpO2 92%      Physical Exam:    GEN:  Alert, oriented, in no distress  HEENT:  PERRLA, EOMI, staples in place right side of his scalp.  Wound is clean and dry and nontender.  NECK;  Supple without cervical adenopathy   LUNGS:  Clear to auscultation without rales, rhonci, or wheezes.  CV:  Heart RRR with 3/6 systolic murmur " left sternal border, no S3 or S4  EXT:  Without cyanosis, clubbing, or edema.  NEURO:  Cranial nerves II through XII intact.  Motor function and sensation intact.  SKIN: Left ring finger has some mild proximal swelling where a few stitches are in place.  The wound is clean and dry without drainage.  There is no redness.  Minimal tenderness to palpation.  PSYCH:  Behavior is appropriate.      Assessment and Plan:     1. Hospital follow-up:   Hospitalization and results reviewed with patient. High risk conditions requiring teaching or care coordination were identified and addressed.The patient demonstrate understanding of admission and underlying conditions. The patient understands discharge instructions and when to seek medical attention. Medications reviewed including instructions regarding high risk medications, dosing and side effects.    The patient is able to safely adhere to ADL/IADL, treatment and medication regimen, self-manage of high-risk conditions?  His daughter helps his caregiver.  The patient requires physical therapy/home health/DME referral?  Home health has been implemented.  The patient requires referral to care coordination/behavioral health/social work?  No   Patient requires referral for pharmacy consult? No   Advance directive/POLST on file?  Yes  Required counseling on advance directive?  No      2. Subarachnoid hemorrhage following injury, no loss of consciousness, subsequent encounter  -Clinically stable.    3. Laceration of scalp, subsequent encounter  -Surgery follow-up as scheduled.  He will get the staples removed at that time    4. Laceration of left ring finger without foreign body without damage to nail, subsequent encounter  -Surgery follow-up as scheduled.  He will get the staples removed at that time.    5. Paroxysmal atrial fibrillation (HCC)  -He is continuing diltiazem.  He will be restarting Coumadin.    6. Essential hypertension  -Controlled on losartan and diltiazem    7.  Stage 3 chronic kidney disease (HCC)  -His creatinine was initially more elevated but stabilized.    8. Moderate aortic stenosis  -Continue cardiology follow-up.        Medication Reconciliation  Medication list at end of encounter:   Current Outpatient Prescriptions   Medication Sig Dispense Refill   • Cetirizine HCl 10 MG Cap Take 1 tablet by mouth every day at 6 PM.     • Cholecalciferol (VITAMIN D3) 5000 units Tab Take 5,000 Units by mouth every day at 6 PM.     • losartan (COZAAR) 25 MG Tab TAKE 1 TABLET BY MOUTH EVERY DAY 90 Tab 1   • Magnesium 400 MG Tab Take 400 mg by mouth every day.     • DILTIAZem CD (CARDIZEM CD) 240 MG CAPSULE SR 24 HR TAKE 1 CAP BY MOUTH EVERY DAY. 90 Cap 3     No current facility-administered medications for this visit.        Primary care follow-up:    Recommended followup:  with new PCP Vicki Escobar   Future Appointments       Provider Department Center    4/26/2019 11:30 AM Ellen Montalvo R.N. [facility]     5/1/2019 JUAN Montalvo R.N. [facility]     5/8/2019 JUAN Montalvo R.N. [facility]     5/9/2019 10:00 AM PRICE [facility]    5/15/2019 GINNY Montalvo R.N. [facility]     5/22/2019 JUAN Montalvo R.N. [facility]     5/24/2019 1:45 PM PACER CHECK-[facility]     5/29/2019 GINNY Montalvo R.N. [facility]     6/5/2019 GINNY Montalvo R.N. [facility]     6/12/2019 JUAN Montalvo R.N. [facility]     6/18/2019 JUAN Montalvo R.N. [facility]           Patient Instruction  Patient provided with educational material on discharge diagnosis and management of symptoms/red flags. Patient instructed to keep follow-up appointments and to bring written questions and and actual medications to each office visit. Patient instructed to call PCP/specialist with any problems/questions/concerns. Patient  verbalizes understanding and has no further questions at this time.    Face-to-face transitional care management services with moderate medical decision complexity were provided.

## 2019-04-25 NOTE — TELEPHONE ENCOUNTER
Spoke with [facility] . Placed order for [facility] to draw pt/INR as not done on 4/23/19    Rashmi Lerma, Pharm D

## 2019-04-25 NOTE — PATIENT INSTRUCTIONS
If you need further assistance, or have any questions; concerns or lingering symptoms before seeing your Primary Care Provider or specialist.     Do not hesitate to contact us.     Please contact us at the Post-Hospital Follow Up Program at [phone] and ask for the MA for Dr. Carreon.   Our offices hours are Monday-Friday 8 am-5 pm.

## 2019-04-26 NOTE — PROGRESS NOTES
Anticoagulation Summary  As of 2019    INR goal:   2.0-3.0   TTR:   80.1 % (3.9 y)   INR used for dosin.2! (2019)   Warfarin maintenance plan:   7.5 mg (5 mg x 1.5) every Wed; 5 mg (5 mg x 1) all other days   Weekly warfarin total:   37.5 mg   Plan last modified:   Ibeth Romero PharmD (2018)   Next INR check:   2019   Target end date:   Indefinite    Indications    Paroxysmal atrial fibrillation (HCC) [I48.0]  Hx-TIA (transient ischemic attack) [Z86.73]             Anticoagulation Episode Summary     INR check location:   Outside Lab    Preferred lab:       Send INR reminders to:       Comments:   [phone]      Anticoagulation Care Providers     Provider Role Specialty Phone number    Taz Jc M.D. Referring Cardiology [phone]    Jean Ayala, PharmD Responsible          Anticoagulation Patient Findings    SPOKE with patient's daughter who reports that Primitivo fell 2019 and the ED and PCP would like him to HOLD warfarin 10 days post fall.  Pt to resume warfarin 2019. Pt will retest 7 days after.  New order placed in EPIC.  Saira Bazan, Clinical Pharmacist

## 2019-05-01 NOTE — PROGRESS NOTES
[facility]    Pt's family reports pt started warfarin on 4/28/19.  Will Request  to obtain next INR ASAP.    Isiah Cast, PharmD

## 2019-05-01 NOTE — TELEPHONE ENCOUNTER
[facility]    Spoke with pt's GENEVAnurse Anamaria [phone].  Pt has completed 10 days without anticoagulation since his fall and is to restart warfarin.  Given history of stroke, ChadsVasc of at least 5 and Hasbled of ~2, left VM with pt requesting a call back to restart warfarin.  Would like to make sure we speak with the pt rather than leave a VM.      Follow up in 1-2 days.    Isiah Cast, MarionD

## 2019-05-02 NOTE — PROGRESS NOTES
Anticoagulation Summary  As of 2019    INR goal:   2.0-3.0   TTR:   79.7 % (3.9 y)   INR used for dosin.40! (2019)   Warfarin maintenance plan:   7.5 mg (5 mg x 1.5) every Wed; 5 mg (5 mg x 1) all other days   Weekly warfarin total:   37.5 mg   Plan last modified:   Marion LimD (2018)   Next INR check:   2019   Target end date:   Indefinite    Indications    Paroxysmal atrial fibrillation (HCC) [I48.0]  Hx-TIA (transient ischemic attack) [Z86.73]             Anticoagulation Episode Summary     INR check location:   Outside Lab    Preferred lab:       Send INR reminders to:       Comments:   [facility]       Anticoagulation Care Providers     Provider Role Specialty Phone number    Taz Jc M.D. Referring Cardiology [phone]    Jean Ayala, PharmD Responsible          Anticoagulation Patient Findings    Left voicemail message to report a SUB therapeutic INR of 1.4.  Pt to Bolus 7.5 mg today then continue with current warfarin dosing regimen. Requested pt contact the clinic for any s/s of unusual bleeding, bruising, clotting or any changes to diet or medication. FU 4 days.     Isiah Cast, PharmD   INR ordered via

## 2019-05-06 NOTE — TELEPHONE ENCOUNTER
Future Appointments       Provider Department Center    5/7/2019 9:30 AM Khushboo Ramos, PT [facility]     5/9/2019 9:30 AM Khushboo Ramos, PT [facility]     5/9/2019 10:00 AM PRICE [facility]    5/13/2019 D Ellen Montalvo R.N. [facility]     5/20/2019 D Ellen Montalvo R.N. [facility]     5/24/2019 1:45 PM PACER CHECK-[facility]     5/27/2019 JUAN Montalvo R.N. [facility]     6/3/2019 JUAN Montalvo R.N. [facility]     6/10/2019 D Ellen Montalvo R.N. [facility]     6/17/2019 RUST Ellen Montalvo R.N. [facility]         ESTABLISHED PATIENT PRE-VISIT PLANNING     Patient was NOT contacted to complete PVP.    1.  Reviewed notes from the last few office visits within the medical group: Yes 2/8/18    2.  If any orders were placed at last visit or intended to be done for this visit (i.e. 6 mos follow-up), do we have Results/Consult Notes?        •  Labs - Labs were not ordered at last office visit.       •  Imaging - Imaging was not ordered at last office visit.       •  Referrals - No referrals were ordered at last office visit.    3. Is this appointment scheduled as a Hospital Follow-Up? No    4.  Immunizations were updated in [facility] using WebIZ?: Yes       •  Web Iz Recommendations: FLU, PNEUMOVAX (PPSV23), TDAP, VARICELLA (Chicken Pox)  and SHINGRIX (Shingles)    5.  Patient is due for the following Health Maintenance Topics:   Health Maintenance Due   Topic Date Due   • IMM DTaP/Tdap/Td Vaccine (1 - Tdap) 09/25/1940   • IMM ZOSTER VACCINES (1 of 2) 09/25/1971   • Annual Wellness Visit  08/04/2016   • IMM PNEUMOCOCCAL 65+ (ADULT) HIGH/HIGHEST RISK SERIES (2 of 2 - PPSV23) 04/05/2018       6. Orders for overdue Health Maintenance topics pended in Pre-Charting? NO    7.  AHA (MDX) form printed for Provider? YES    8.  Patient was NOT informed to arrive 15 min  prior to their scheduled appointment and bring in their medication bottles.

## 2019-05-07 NOTE — PROGRESS NOTES
Anticoagulation Summary  As of 2019    INR goal:   2.0-3.0   TTR:   79.5 % (3.9 y)   INR used for dosin.90! (2019)   Warfarin maintenance plan:   7.5 mg (5 mg x 1.5) every Tue, Thu; 5 mg (5 mg x 1) all other days   Weekly warfarin total:   40 mg   Plan last modified:   Saira Bazan (2019)   Next INR check:   2019   Target end date:   Indefinite    Indications    Paroxysmal atrial fibrillation (HCC) [I48.0]  Hx-TIA (transient ischemic attack) [Z86.73]             Anticoagulation Episode Summary     INR check location:   Outside Lab    Preferred lab:       Send INR reminders to:       Comments:   [facility]      Anticoagulation Care Providers     Provider Role Specialty Phone number    Taz Jc M.D. Referring Cardiology [phone]    Jean Ayala, PharmD Responsible          Anticoagulation Patient Findings    Spoke with Mrs. Milan to report a sub therapeutic INR of 1.9.  Will increase weekly dose by 6%. Follow up in 4 days, to reduce the risk of adverse events related to this high risk medication, warfarin.    Saira Bazan Clinical Pharmacist    New order placed in [facility] for [facility]

## 2019-05-09 PROBLEM — Z91.81 RISK FOR FALLS: Status: ACTIVE | Noted: 2019-01-01

## 2019-05-09 NOTE — ASSESSMENT & PLAN NOTE
This is a chronic problem for the patient that is ongoing.  Patient reports that he takes over-the-counter vitamin D3 5000 units/day.   3/27/2013 10:22 11/25/2013 13:29 7/20/2017 15:21   25-Hydroxy   Vitamin D 25 56 37 26 (L)

## 2019-05-09 NOTE — ASSESSMENT & PLAN NOTE
This is a chronic problem for the patient that is ongoing.  The patient has bilateral hearing aids in place and reports that he hopes to get a new pair.

## 2019-05-09 NOTE — ASSESSMENT & PLAN NOTE
This is a chronic problem for the patient that is managed by dermatology.  Patient states he follows with dermatology every 4 to 5 months.  States he also has a history of basal cell carcinoma.  Patient reports that the malignant melanoma was on his back, and has since been removed.

## 2019-05-09 NOTE — ASSESSMENT & PLAN NOTE
This is a chronic problem for the patient that is controlled.  The patient's blood pressure today is 102/58 with a heart rate of 72.  The patient continues to take losartan 25 mg/day and diltiazem  mg/day, denies any side effects of these medications. The patient denies chest pain, shortness of breath, headaches, dizziness, blurry vision, or dyspnea on exertion.

## 2019-05-09 NOTE — ASSESSMENT & PLAN NOTE
Patient had a pacemaker placed in September 2009.  Patient is followed by cardiology, next appointment is on 5/21/2019.  Patient has pacer checks twice per year.

## 2019-05-09 NOTE — ASSESSMENT & PLAN NOTE
This is a current problem.  Patient had a recent fall that resulted in a subarachnoid hemorrhage and hospital stay.  Patient reports that he has a front wheel walker available to use, but prefers not to.

## 2019-05-09 NOTE — ASSESSMENT & PLAN NOTE
This is a chronic problem for the patient that is ongoing.  The patient's last echocardiogram was completed on 10/1/2018.  The patient follows with cardiology for this issue.

## 2019-05-09 NOTE — ASSESSMENT & PLAN NOTE
This is a history for the patient.  Patient follows with urology for this issue.  States he had radical prostatectomy in the past.

## 2019-05-09 NOTE — ASSESSMENT & PLAN NOTE
This was a chronic problem for the patient.  Patient has had a pacemaker placed for this problem, approximately in 2009.  Patient follows up with cardiology for this issue and has a pacemaker check every 6 months.  The patient's next appointment with cardiology is on 5/21/2018.

## 2019-05-09 NOTE — PROGRESS NOTES
CC:   Chief Complaint   Patient presents with   • Establish Care   • Hospital Follow-up     04/19/19 Head injury do to Fall       HISTORY OF THE PRESENT ILLNESS: Patient is a 89 y.o. male. This pleasant patient is here today to establish care and discuss multiple issues as listed below.    Health Maintenance: Completed    Annual Health Assessment Questions:    1.  Are you currently engaging in any exercise or physical activity? Yes    2.  How would you describe your mood or emotional well-being today? good    3.  Have you had any falls in the last year? Yes    4.  Have you noticed any problems with your balance or had difficulty walking? No    5.  In the last six months have you experienced any leakage of urine? Yes    6. DPA/Advanced Directive: Patient has Durable Power of  on file. Scanning into File today    Presence of permanent cardiac pacemaker  Patient had a pacemaker placed in September 2009.  Patient is followed by cardiology, next appointment is on 5/21/2019.  Patient has pacer checks twice per year.    Chronic anticoagulation  This is a chronic problem for the patient that is ongoing.  Patient continues Coumadin daily for atrial fibrillation.  The patient was having his INRs checked through the lab and Coumadin clinic was advising daily medication dose.  Patient states that since his fall and discharge home, home health nurses have been checking his INR.    Moderate aortic stenosis  This is a chronic problem for the patient that is ongoing.  The patient's last echocardiogram was completed on 10/1/2018.  The patient follows with cardiology for this issue.    Hx-TIA (transient ischemic attack)  Patient reports that he had a TIA in the past, no residual deficits.    History of complete AV block  This was a chronic problem for the patient.  Patient has had a pacemaker placed for this problem, approximately in 2009.  Patient follows up with cardiology for this issue and has a pacemaker check every 6  months.  The patient's next appointment with cardiology is on 5/21/2018.    Essential hypertension  This is a chronic problem for the patient that is controlled.  The patient's blood pressure today is 102/58 with a heart rate of 72.  The patient continues to take losartan 25 mg/day and diltiazem  mg/day, denies any side effects of these medications. The patient denies chest pain, shortness of breath, headaches, dizziness, blurry vision, or dyspnea on exertion.    Subarachnoid hemorrhage following injury, no loss of consciousness (HCC)  This is a new problem for the patient that occurred after a fall on 4/17/2019.  The patient was in his backyard and fell and hit his head on a brick retaining wall.  The patient is on chronic anticoagulation for atrial fibrillation.  The patient had a laceration to his scalp that has been sutured.  While in the hospital it was decided nonoperative management for this problem.  The patient was not placed on Keppra and neurosurgery signed off.    Renal mass, left  This is a chronic problem for the patient that is ongoing and stable.  The patient follows with urology and has annual ultrasounds for this issue.  Patient reports that there have been minor changes, but the mass is stable.    Extranodal lymphoma (CMS-HCC) (HCC)  This is a chronic problem for the patient that is ongoing.  Patient reports that he had a biopsy and it was recommended to pursue further treatment.  Patient states that he was told this problem was slow-growing and he opted for no treatment or monitoring.    Personal history of malignant neoplasm of prostate  This is a history for the patient.  Patient follows with urology for this issue.  States he had radical prostatectomy in the past.    Vitamin D deficiency  This is a chronic problem for the patient that is ongoing.  Patient reports that he takes over-the-counter vitamin D3 5000 units/day.   3/27/2013 10:22 11/25/2013 13:29 7/20/2017 15:21   25-Hydroxy    Vitamin D 25 56 37 26 (L)       Malignant melanoma (CMS-HCC)  This is a chronic problem for the patient that is managed by dermatology.  Patient states he follows with dermatology every 4 to 5 months.  States he also has a history of basal cell carcinoma.  Patient reports that the malignant melanoma was on his back, and has since been removed.    Hearing problem of both ears  This is a chronic problem for the patient that is ongoing.  The patient has bilateral hearing aids in place and reports that he hopes to get a new pair.    Balance problem  This is a current problem.  Patient had a recent fall that resulted in a subarachnoid hemorrhage and hospital stay.  Patient reports that he has a front wheel walker available to use, but prefers not to.    Risk for falls  This is a current problem.  Patient had a recent fall that resulted in a subarachnoid hemorrhage and hospital stay.  Patient reports that he has a front wheel walker available to use, but prefers not to.    Paroxysmal atrial fibrillation (HCC)  This is a chronic problem for the patient that is treated with diltiazem  mg/day and Coumadin.  The patient also has a history of an AV block with placement of pacemaker.  Patient follows with cardiology, next appointment is on 5/21/2019.    Elevated serum creatinine  This is a chronic problem for the patient that is ongoing.  The patient's creatinine peaked at 1.75 on 4/17/2019, the day of his admission to the hospital for subarachnoid hemorrhage.  His creatinine had decreased to 1.64, which is around his baseline for the last couple of years, at discharge.   2/21/2018 11:25 2/5/2019 11:34 4/17/2019 17:11 4/18/2019 04:48 4/19/2019 04:25   Bun 32 (H) 39 (H) 33 (H) 30 (H) 33 (H)   Creatinine 1.62 (H) 1.69 (H) 1.75 (H) 1.63 (H) 1.64 (H)   GFR  40 (A) 38 (A) 36 (A) 39 (A) 39 (A)     Allergies: Iodine    Current Outpatient Prescriptions Ordered in [facility]   Medication Sig Dispense Refill   • warfarin (COUMADIN) 5 MG  Tab Take 1-1.5 Tabs by mouth every day. 45 Tab 0   • Cetirizine HCl 10 MG Cap Take 1 tablet by mouth every day at 6 PM.     • Cholecalciferol (VITAMIN D3) 5000 units Tab Take 5,000 Units by mouth every day at 6 PM.     • losartan (COZAAR) 25 MG Tab TAKE 1 TABLET BY MOUTH EVERY DAY 90 Tab 1   • Magnesium 400 MG Tab Take 400 mg by mouth every day.     • DILTIAZem CD (CARDIZEM CD) 240 MG CAPSULE SR 24 HR TAKE 1 CAP BY MOUTH EVERY DAY. 90 Cap 3     No current Epic-ordered facility-administered medications on file.        Past Medical History:   Diagnosis Date   • Anticoagulation monitoring, special range    • Atrial fibrillation (HCC)    • Atrioventricular block, complete (HCC)    • Bell's palsy    • Cancer (HCC) 2001    left eye, radiation   • Cancer (HCC) 1991    prostate, surgery   • Essential hypertension    • Extranodal lymphoma (HCC)    • History of complete AV block    • History of TIA (transient ischemic attack) and stroke    • Hx-TIA (transient ischemic attack)    • Kidney mass     CT   • Laceration of left ring finger 4/17/2019   • Other dyspnea and respiratory abnormality    • Other malignant lymphomas, unspecified site, extranodal and solid organ sites    • Paroxysmal atrial fibrillation (HCC)    • Personal history of malignant neoplasm of prostate    • Personal history of venous thrombosis and embolism    • Pneumonia due to infectious organism 12/11/2018   • Presence of permanent cardiac pacemaker     September 2009:  Medtronic Versa [id number] implanted by Dr. Lexa Lopes.    • Renal disorder 1970    stones   • Scalp laceration 4/17/2019   • Sleep apnea     Cannot use CPAP machine.   • Stroke (HCC) 1991   • TIA (transient ischemic attack)    • Trauma 4/17/2019       Past Surgical History:   Procedure Laterality Date   • RECOVERY  5/19/2011    Performed by SURGERY, IR-RECOVERY at SURGERY SAME DAY [facility]   • PACEMAKER INSERTION  September 2009    Medtronic Versa [id number] implanted by Dr. Lexa Lopes.   • EYE  SURGERY  2006    left eye mass/ cancer/ s/p radiation   • LITHOTRIPSY  1995    left kidney x 3   • PROSTATECTOMY, RADICAL RETRO  1991   • EYE SURGERY         Social History   Substance Use Topics   • Smoking status: Never Smoker   • Smokeless tobacco: Never Used   • Alcohol use No       Social History     Social History Narrative    Retired from [employer]        Family History   Problem Relation Age of Onset   • Heart Disease Mother    • Heart Attack Mother    • Heart Disease Father    • Heart Attack Father    • No Known Problems Maternal Grandmother    • No Known Problems Maternal Grandfather    • No Known Problems Paternal Grandmother    • No Known Problems Paternal Grandfather      ROS:   Constitutional:  Negative for fever, chills, unexpected weight change, night sweats, body aches, sleep issues, and fatigue/generalized weakness.   HEENT: Positive for bilateral hearing loss. Negative for headaches, vision changes, ear pain, tinnitus, ear discharge, rhinorrhea, sinus congestion, sneezing, sore throat, and neck pain.    Respiratory:  Negative for cough, shortness of breath, sputum production, hemoptysis, chest congestion, dyspnea, wheezing, and crackles.    Cardiovascular:  Negative for chest pain, palpitations, SMITH, paroxsymal nocturnal dyspnea, orthopnea, and bilateral lower extremity edema.   Gastrointestinal:  Negative for heartburn, nausea, vomiting, abdominal pain, hematochezia, melena, diarrhea, constipation, and greasy/foul-smelling stools.   Genitourinary: Positive for urinary incontinence. Negative for dysuria, nocturia, polyuria, hematuria, pyuria, urinary urgency, urinary frequency.   Musculoskeletal:  Negative for myalgias, back pain, and joint pain.   Skin: Positive for healing laceration to left head and finger. Negative for rash, sores, lumps, itching, cyanotic skin color change.   Neurological: Negative for dizziness, tingling, tremors, focal sensory deficit, focal weakness and  "headaches.   Endo/Heme/Allergies: Patient is on chronic anticoagulation with coumadin. Denies cold/heat intolerance.   Psychiatric/Behavioral: Negative for depression, suicidal/homicidal ideation and memory loss.        Exam: /58 (BP Location: Left arm, Patient Position: Sitting, BP Cuff Size: Adult)   Pulse 72   Temp 36.4 °C (97.5 °F) (Temporal)   Ht 1.626 m (5' 4\")   Wt 74.8 kg (165 lb)   SpO2 94%  Body mass index is 28.32 kg/m².    General:  Normal appearing. No distress.  HEENT: Pupils reactive to light, unequal Left 3 Right 2. Bilateral baseline lid ptosis. Normocephalic. Eyes conjunctiva clear, ears normal shape and contour, bilateral hearing aides in place, nasal mucosa benign, oropharynx is without erythema, edema or exudates.   Neck:  Supple without JVD or bruit.  Pulmonary:  Clear to ausculation.  Normal effort. No rales, ronchi, or wheezing.  Cardiovascular: Murmur heard. Regular rate and rhythm. Carotid and radial pulses are intact and equal bilaterally.  Abdomen:  Soft, nontender, nondistended. Normal bowel sounds.  Neurologic:  Grossly nonfocal.  Skin:  Warm and dry.  No obvious lesions.  Musculoskeletal: Unsteady gait. No extremity cyanosis, clubbing, or edema.  Psych:  Normal mood and affect. Alert and oriented x3. Judgment and insight is normal.    Assessment/Plan:  1. Extranodal lymphoma (HCC)  Patient declines further workup or monitoring   2.  3. History of complete AV block   Presence of permanent cardiac pacemaker  Continue follow up with cardiology and pacemaker checks   3. Paroxysmal atrial fibrillation (HCC)   Chronic anticoagulation  Continue follow up with cardiology  Continue diltiazem cd 240 mg/day and coumadin   5. Subarachnoid hemorrhage following injury, no loss of consciousness, subsequent encounter  The patients daughter plans to call, as advised after discharge, regarding neurology follow up and plan. The patient is asymptomatic, but does have unequal pupils (left is " larger than the right) that are reactive to light. The patient does have a history of cataracts and left eye cancer, and I am unable to find if the unequal pupils are baseline due to the history of the eye, or new related to recent fall and SAH   6. Moderate aortic stenosis  Continue follow up with cardiology   7. Essential hypertension  Continue losartan 25 mg/day   8.  9. Renal mass, left   Personal history of malignant neoplasm of prostate  Continue follow up with urology   10. Vitamin D deficiency  Continue OTC vitamin d 5000 units/day   11. Malignant melanoma, unspecified site (HCC)  Continue follow up with dermatology   12. Hearing problem of both ears  Continue using hearing aides   13. Elevated serum creatinine  Plan to recheck, encouraged hydration   14. Balance problem  Encouraged patient to use FWW that he has available   15. Risk for falls  Patient identified as fall risk.  Appropriate orders and counseling given.   16. Need for vaccination  PneumoVax PPV23 =>3yo - Given today   17. Establishing care with new doctor, encounter for     18. Hospital discharge follow-up       Educated in proper administration of medication(s) ordered today including safety, possible SE, risks, benefits, rationale and alternatives to therapy.   Supportive care, differential diagnoses, and indications for immediate follow-up discussed with patient.    Pathogenesis of diagnosis discussed including typical length and natural progression.    Instructed to return to clinic or nearest emergency department for any change in condition, further concerns, or worsening of symptoms.  Patient states understanding of the plan of care and discharge instructions.    Return in about 6 months (around 11/9/2019) for Established Patient - Long.    I have placed the below orders and discussed them with an approved delegating provider. The MA is performing the below orders under the direction of Dr. Fry.    Please note that this dictation was  created using voice recognition software. I have made every reasonable attempt to correct obvious errors, but I expect that there are errors of grammar and possibly content that I did not discover before finalizing the note.

## 2019-05-09 NOTE — ASSESSMENT & PLAN NOTE
This is a new problem for the patient that occurred after a fall on 4/17/2019.  The patient was in his backyard and fell and hit his head on a brick retaining wall.  The patient is on chronic anticoagulation for atrial fibrillation.  The patient had a laceration to his scalp that has been sutured.  While in the hospital it was decided nonoperative management for this problem.  The patient was not placed on Keppra and neurosurgery signed off.

## 2019-05-09 NOTE — ASSESSMENT & PLAN NOTE
This is a chronic problem for the patient that is ongoing.  Patient reports that he had a biopsy and it was recommended to pursue further treatment.  Patient states that he was told this problem was slow-growing and he opted for no treatment or monitoring.

## 2019-05-09 NOTE — ASSESSMENT & PLAN NOTE
This is a chronic problem for the patient that is ongoing.  Patient continues Coumadin daily for atrial fibrillation.  The patient was having his INRs checked through the lab and Coumadin clinic was advising daily medication dose.  Patient states that since his fall and discharge home, home health nurses have been checking his INR.

## 2019-05-09 NOTE — ASSESSMENT & PLAN NOTE
This is a chronic problem for the patient that is ongoing and stable.  The patient follows with urology and has annual ultrasounds for this issue.  Patient reports that there have been minor changes, but the mass is stable.

## 2019-05-11 PROBLEM — Z02.9 DISCHARGE PLANNING ISSUES: Status: RESOLVED | Noted: 2019-01-01 | Resolved: 2019-01-01

## 2019-05-11 PROBLEM — J18.9 PNEUMONIA DUE TO INFECTIOUS ORGANISM: Status: RESOLVED | Noted: 2018-12-11 | Resolved: 2019-01-01

## 2019-05-11 PROBLEM — S01.01XA SCALP LACERATION: Status: RESOLVED | Noted: 2019-01-01 | Resolved: 2019-01-01

## 2019-05-11 PROBLEM — Z01.89 ENCOUNTER FOR GERIATRIC ASSESSMENT: Status: RESOLVED | Noted: 2019-01-01 | Resolved: 2019-01-01

## 2019-05-11 PROBLEM — Z78.9 NO CONTRAINDICATION TO DEEP VEIN THROMBOSIS (DVT) PROPHYLAXIS: Status: RESOLVED | Noted: 2019-01-01 | Resolved: 2019-01-01

## 2019-05-11 PROBLEM — Z75.8 DISCHARGE PLANNING ISSUES: Status: RESOLVED | Noted: 2019-01-01 | Resolved: 2019-01-01

## 2019-05-11 PROBLEM — S61.215A LACERATION OF LEFT RING FINGER: Status: RESOLVED | Noted: 2019-01-01 | Resolved: 2019-01-01

## 2019-05-11 PROBLEM — T14.90XA TRAUMA: Status: RESOLVED | Noted: 2019-01-01 | Resolved: 2019-01-01

## 2019-05-11 NOTE — ASSESSMENT & PLAN NOTE
This is a chronic problem for the patient that is ongoing.  The patient's creatinine peaked at 1.75 on 4/17/2019, the day of his admission to the hospital for subarachnoid hemorrhage.  His creatinine had decreased to 1.64, which is around his baseline for the last couple of years, at discharge.   2/21/2018 11:25 2/5/2019 11:34 4/17/2019 17:11 4/18/2019 04:48 4/19/2019 04:25   Bun 32 (H) 39 (H) 33 (H) 30 (H) 33 (H)   Creatinine 1.62 (H) 1.69 (H) 1.75 (H) 1.63 (H) 1.64 (H)   GFR  40 (A) 38 (A) 36 (A) 39 (A) 39 (A)

## 2019-05-11 NOTE — ASSESSMENT & PLAN NOTE
This is a chronic problem for the patient that is treated with diltiazem  mg/day and Coumadin.  The patient also has a history of an AV block with placement of pacemaker.  Patient follows with cardiology, next appointment is on 5/21/2019.

## 2019-05-13 NOTE — PROGRESS NOTES
Anticoagulation Summary  As of 5/13/2019    INR goal:   2.0-3.0   TTR:   79.4 % (3.9 y)   INR used for dosing:   3.50! (5/13/2019)   Warfarin maintenance plan:   7.5 mg (5 mg x 1.5) every Wed; 5 mg (5 mg x 1) all other days   Weekly warfarin total:   37.5 mg   Plan last modified:   Isiah Cast PharmD (5/14/2019)   Next INR check:   5/21/2019   Target end date:   Indefinite    Indications    Paroxysmal atrial fibrillation (HCC) [I48.0]  Hx-TIA (transient ischemic attack) (Resolved) [Z86.73]             Anticoagulation Episode Summary     INR check location:   Outside Lab    Preferred lab:       Send INR reminders to:       Comments:         Anticoagulation Care Providers     Provider Role Specialty Phone number    Taz Jc M.D. Referring Cardiology [phone]    Marion MackenzieD Responsible          Anticoagulation Patient Findings    Left voicemail message to report a supratherapeutic INR of 3.5.  Requested pt contact the clinic for any s/s of unusual bleeding, bruising, clotting or any changes to diet or medication.   Instructed patient to decrease today's dose to 2.5mg, then resume current warfarin regimen.  FU 1 weeks.  Marion LovettD    Addendum:    Pt's daughter called to confirm warfarin dosing.  Pt was taking less warfarin than requested and did not reduce warfarin dose as requested.  Will have pt reduce warfarin dose tomorrow (he already took today's dose) then Pt is to continue with current warfarin dosing regimen.     Isiah Cast, Lai

## 2019-05-21 PROBLEM — I44.30 AV BLOCK: Status: ACTIVE | Noted: 2019-01-01

## 2019-05-21 NOTE — PROGRESS NOTES
Device is working normally. 4 mode switching episodes (longest episode 4+ days on 5/8/2019, 12.8% of total time); he is anticoagulated with Coumadin.  Normal sensing and capture of RA and RV leads; stable impedances. Battery longevity is 2.5 years.  No changes are made today.    FU in 6 months for next PM check, along with follow-up with Dr. Marshall    Collaborating MD: Rolando

## 2019-05-21 NOTE — PROGRESS NOTES
Anticoagulation Summary  As of 2019    INR goal:   2.0-3.0   TTR:   79.1 % (4 y)   INR used for dosin.80 (2019)   Warfarin maintenance plan:   5 mg (5 mg x 1) every day   Weekly warfarin total:   35 mg   Plan last modified:   Sánchez Ochoa, PharmD (2019)   Next INR check:   2019   Target end date:   Indefinite    Indications    Chronic anticoagulation [Z79.01]  Paroxysmal atrial fibrillation (HCC) [I48.0]  History of TIA (transient ischemic attack) and stroke (Resolved) [Z86.73]             Anticoagulation Episode Summary     INR check location:   Outside Lab    Preferred lab:       Send INR reminders to:       Comments:         Anticoagulation Care Providers     Provider Role Specialty Phone number    Taz Jc M.D. Referring Cardiology [phone]    Jean Ayala, PharmD Responsible          Anticoagulation Patient Findings  Patient Findings     Negatives:   Signs/symptoms of thrombosis, Signs/symptoms of bleeding, Laboratory test error suspected, Change in health, Change in alcohol use, Change in activity, Upcoming invasive procedure, Emergency department visit, Upcoming dental procedure, Missed doses, Extra doses, Change in medications, Change in diet/appetite, Hospital admission, Bruising, Other complaints        HPI:   Primitivo Milan seen in clinic today, on anticoagulation therapy with warfarin for stroke prevention due to history of PAF and TIA.    Patient's previous INR was supratherapeutic at 3.5 on 19, at which time patient was instructed to decrease one dose, then resume current warfarin regimen.  He returns to clinic today to recheck INR to ensure it is therapeutic and thus preventing possible clotting and/or bleeding/bruising complications.    CHADS-VASc = at least 5  (unadjusted ischemic stroke risk/year:  7.2%, which is high risk given hx of TIA.)    Does patient have any changes to current medical/health status since last appt (Y/N):  NO  Does patient have  "any signs/symptoms of bleeding and/or thrombosis since the last appt (Y/N):  NO  Does patient have any interval changes to diet or medications since last appt (Y/N):  NO  Are there any complications or cost restrictions with current therapy (Y/N):  NO      Vitals:  BP check declined today     Weight  Scale unavailable   Height   5' 4\"     Asssessment:      INR therapeutic at 2.8, therefore decreasing patient's risk of stroke and/or bleeding complications.   Reason(s) for out of range INR today:  n/a      Plan:  Patient is poor historian and it is tough to gauge what total dose of warfarin he has taken this week.  Will have him take 5mg daily for the next seven days, then recheck INR to ensure it has remained in therapeutic range.     Follow up:  Because warfarin is a high risk medication and current CHEST guidelines recommend regular monitoring intervals (few days up to 12 weeks), will have patient return to clinic in 1 weeks to recheck INR.     Sánchez Ochoa, PharmD         "

## 2019-05-22 NOTE — PROGRESS NOTES
Primitivo Milan was admitted on 4/17/19 for mechanical ground level fall, once treated he was discharged home on 4/19/19. [facility] patient advocate assisted with multiple discharge needs including 2 appointments, 2 Primary Care Physician appointments. Of the 2 appointments the patient kept 2. Patient is also scheduled for 3 appointments with, Pacer Check on 5/21, INR Check on 5/21, and Primary Care Physician on 11/14. [facility] also assisted with discharge orders for [facility] for SN and PT which the patient utilized from 4/20/19 through 5/10/19. PPS Screening was conducted and patient scored above 50%.     Patient advocate spoke with the patient's daughter Hollie all through the whole 30 day follow up really nice lady she informed me that Primitivo is doing very well, patient is very independent and is able to do all on his own without assistance, patient also does not use any kind of DME and has not had any falls. Patient is adherent with appointments and medications. Patient was discharged from  just recently on 5/13.

## 2019-05-24 NOTE — TELEPHONE ENCOUNTER
Pt is being closely followed by the [facility]. Most recent visit was 5/19 with a therapeutic value of 2.0. Will send 6 months to pharmacy.

## 2019-05-26 NOTE — H&P
TRAUMA HISTORY AND PHYSICAL    CHIEF COMPLAINT: Mechanical ground-level fall.  Intracranial hemorrhage on warfarin.     HISTORY OF PRESENT ILLNESS: The patient is a 89 year-old White elderly man who was injured And a mechanical ground-level fall.  He denies any loss of consciousness.  He sustained a laceration to the the head.    TRIAGE CATEGORY: The patient was triaged as a Non-Trauma Activation. An expeditious primary and secondary survey with required adjuncts was conducted by the emergency department physician.  I was asked to see the patient in trauma surgical consultation when CT imaging demonstrated a small intracranial hemorrhage.  His warfarin anticoagulation has been promptly reversed. See Trauma Narrator for full details.    PAST MEDICAL HISTORY:  has a past medical history of Anticoagulation monitoring, special range; Atrial fibrillation (HCC); Atrioventricular block, complete (HCC); Bell's palsy; Cancer (HCC) (2001); Cancer (HCC) (1991); Essential hypertension; Extranodal lymphoma (HCC); Kidney mass; Other dyspnea and respiratory abnormality; Other malignant lymphomas, unspecified site, extranodal and solid organ sites; Paroxysmal atrial fibrillation (HCC); Personal history of malignant neoplasm of prostate; Personal history of venous thrombosis and embolism; Presence of permanent cardiac pacemaker; Renal disorder (1970); Sleep apnea; Stroke (HCC) (1991); and TIA (transient ischemic attack).     PAST SURGICAL HISTORY:  has a past surgical history that includes lithotripsy (1995); prostatectomy, radical retro (1991); eye surgery (2006); recovery (5/19/2011); eye surgery; and pacemaker insertion (September 2009).    ALLERGIES:   Allergies   Allergen Reactions   • Iodine      CURRENT MEDICATIONS:    Home Medications     Reviewed by Kristina Ricci (Pharmacy Tech) on 04/17/19 at 2013  Med List Status: Complete   Medication Last Dose Status   calcium citrate (CALCITRATE) 950 MG Tab 4/17/2019 Active    DILTIAZem CD (CARDIZEM CD) 240 MG CAPSULE SR 24 HR 4/17/2019 Active   losartan (COZAAR) 25 MG Tab 4/17/2019 Active   Magnesium 400 MG Tab 4/17/2019 Active   vitamin D (CHOLECALCIFEROL) 1000 UNIT TABS 4/17/2019 Active   warfarin (COUMADIN) 5 MG Tab 4/16/2019 Active              FAMILY HISTORY: family history includes Heart Attack in his father and mother; Heart Disease in his father and mother.    SOCIAL HISTORY:  reports that he has never smoked. He has never used smokeless tobacco. He reports that he does not drink alcohol or use drugs.    REVIEW OF SYSTEMS: Comprehensive review of systems is negative with the exception of the aforementioned HPI, PMH, and PSH bullets in accordance with CMS guidelines    PHYSICAL EXAMINATION:     CONSTITUTIONAL:     Vital Signs: /57   Pulse 63   Temp 37.2 °C (99 °F) (Temporal)   Resp 16   Wt 76 kg (167 lb 8.8 oz)   SpO2 93%    General Appearance: appears stated age, is in no apparent distress.  HEENT:    Right parietal scalp laceration. The pupils are equal, round, and reactive to light bilaterally. The extraocular muscles are intact bilaterally. The ear canals and tympanic membranes are normal. The nares and oropharynx are clear. The midface and jaw are stable. No malocclusion is evident.  NECK:    The cervical spine is supple and non tender. Normal range of motion. No posterior midline cervical-spine tenderness, no evidence of intoxication, normal level of alertness ([place] Coma Scale 15), no focal neurologic deficit, and no painful distracting injuries. The trachea is midline. No crepitance.  RESPIRATORY:   Inspection: Unlabored respirations, no intercostal retractions, paradoxical motion, or accessory muscle use.   Palpation:  The chest is nontender. The clavicles are non deformed bilaterally.   Auscultation: clear to auscultation.  CARDIOVASCULAR:   Auscultation: regular rate and rhythm.   Peripheral Pulses: Normal.   ABDOMEN:   Abdomen is soft, nontender, without  organomegaly or masses.  MUSCULOSKELETAL:   The pelvis is stable. No significant angulation, deformity, or soft tissue injury involving the upper and lower extremities. Left fourth finger volar PIP laceration.  BACK:   The thoracolumbar spine was examined utilizing spinal motion restriction. Examination is remarkable for no significant tenderness, swelling, or deformity in the thoracolumbar region.  SKIN:    The skin is warm, dry and well purfused.  NEUROLOGIC:    [place] Coma Scale (GCS) 15. Neurologic examination revealed no focal deficits noted, mental status intact, cranial nerves II through XII intact, muscle tone normal, muscle strength normal, sensation is intact.  PSYCHIATRIC:   The patient does not appear depressed or anxious, oriented to time, place, person, short and long term memory appears intact, judgement and insight appear intact.    LABORATORY VALUES:   Recent Labs      04/17/19   1711   WBC  5.9   RBC  4.43*   HEMOGLOBIN  14.4   HEMATOCRIT  44.0   MCV  99.3*   MCH  32.5   MCHC  32.7*   RDW  52.9*   PLATELETCT  174   MPV  10.7     Recent Labs      04/17/19   1711   SODIUM  136   POTASSIUM  4.9   CHLORIDE  106   CO2  21   GLUCOSE  93   BUN  33*   CREATININE  1.75*   CALCIUM  8.7     Recent Labs      04/15/19   1301  04/17/19   1711   ASTSGOT   --   41   ALTSGPT   --   20   TBILIRUBIN   --   0.7   ALKPHOSPHAT   --   65   GLOBULIN   --   2.3   INR  2.57*  2.33*     Recent Labs      04/15/19   1301  04/17/19   1711   APTT   --   46.0*   INR  2.57*  2.33*        IMAGING:   CT-HEAD W/O   Final Result      1.  Small amount of subarachnoid hemorrhage in the sulci of the left frontal lobe. No mass effect or midline shift.      2.  Diffuse atrophy and periventricular white matter change, consistent with chronic small vessel disease.      3.  Calcified extra-axial mass in the anterior right middle cranial fossa is consistent with a meningioma            Comment: Results discussed with Dr. Benson at 7:35 PM       CT-HEAD W/O    (Results Pending)       ACTIVE PROBLEMS:     Trauma  Mechanical ground-level fall on warfarin.  Small traumatic subarachnoid hemorrhage.  Non Trauma Activation.  Omega Howard MD. Trauma Surgery.    Subarachnoid hemorrhage following injury, no loss of consciousness (HCC)  Small amount of subarachnoid hemorrhage in the sulci of the left frontal lobe. No mass effect or midline shift.  Warfarin anticoagulation reversed on admission.  Repeat interval CT imaging of the brain.  Post traumatic pharmacologic seizure prophylaxis for 1 week.  Non-operative management.  Luiz Ridley III, MD. Neurosurgery.    Chronic anticoagulation  Chronic warfarin anticoagulation for atrial fibrillation.  Reversed with KCentra and Vitamin K on admission.  Trend INR.    Paroxysmal atrial fibrillation (HCC)  Chronic condition treated with Diltiazem and Coumadin.  Diltiazem resumed on admission.    Scalp laceration  Staple repair completed in Emergency Department.  Remove staples in 7 days (4/24).    Essential hypertension  Chronic condition treated with losartan.  Resumed maintenance medication on admission.      ASSESSMENT AND PLAN:  Intracranial hemorrhage with warfarin anticoagulation.  KCentra reversal.  Neurosurgical consultation.  Tertiary survey.    DISPOSITION: Trauma ICU.    CRITICAL CARE TIME: 31 minutes excluding procedures.  ____________________________________   Omega Howard M.D.    DD: 4/17/2019  8:01 PM     normal (ped)...

## 2019-05-28 NOTE — PROGRESS NOTES
Anticoagulation Summary  As of 5/28/2019    INR goal:   2.0-3.0   TTR:   78.9 % (4 y)   INR used for dosing:   3.50! (5/28/2019)   Warfarin maintenance plan:   2.5 mg (5 mg x 0.5) every Tue; 5 mg (5 mg x 1) all other days   Weekly warfarin total:   32.5 mg   Plan last modified:   Sánchez Ochoa, PharmD (5/28/2019)   Next INR check:   6/4/2019   Target end date:   Indefinite    Indications    Chronic anticoagulation [Z79.01]  Paroxysmal atrial fibrillation (HCC) [I48.0]  History of TIA (transient ischemic attack) and stroke (Resolved) [Z86.73]             Anticoagulation Episode Summary     INR check location:   Outside Lab    Preferred lab:       Send INR reminders to:       Comments:         Anticoagulation Care Providers     Provider Role Specialty Phone number    Taz Jc M.D. Referring Cardiology [phone]    Jean Ayala, PharmD Responsible          Anticoagulation Patient Findings  Patient Findings     Negatives:   Signs/symptoms of thrombosis, Signs/symptoms of bleeding, Laboratory test error suspected, Change in health, Change in alcohol use, Change in activity, Upcoming invasive procedure, Emergency department visit, Upcoming dental procedure, Missed doses, Extra doses, Change in medications, Change in diet/appetite, Hospital admission, Bruising, Other complaints        HPI:   Primitivo Milan seen in clinic today, on anticoagulation therapy with warfarin for stroke prevention due to history of PAF and TIA.    Patient's previous INR was therapeutic at 2.8 on 5-21-19, at which time patient was instructed to decrease weekly warfarin regimen.  He returns to clinic today to recheck INR to ensure it is therapeutic and thus preventing possible clotting and/or bleeding/bruising complications.    CHADS-VASc = at least 5  (unadjusted ischemic stroke risk/year:  7.2%, which is high risk given hx of TIA)    Does patient have any changes to current medical/health status since last appt (Y/N):  NO  Does  "patient have any signs/symptoms of bleeding and/or thrombosis since the last appt (Y/N):  NO  Does patient have any interval changes to diet or medications since last appt (Y/N):  NO  Are there any complications or cost restrictions with current therapy (Y/N):  NO      Vitals:  BP check declined today     Weight  Scale unavailable   Height   5' 4\"     Asssessment:      INR supratherapeutic at 3.5, therefore increasing patient's risk of bleeding complications due to warfarin.   Reason(s) for out of range INR today:  Dose too high.      Plan:  Instructed patient to decrease weekly warfarin regimen as detailed above in order to bring INR to therapeutic range.     Follow up:  Because warfarin is a high risk medication and current CHEST guidelines recommend regular monitoring intervals (few days up to 12 weeks), will have patient return to clinic in 1 weeks to recheck INR.     Sánchez Ochoa, PharmD         "

## 2019-05-29 ENCOUNTER — APPOINTMENT (RX ONLY)
Dept: URBAN - METROPOLITAN AREA CLINIC 4 | Facility: CLINIC | Age: 84
Setting detail: DERMATOLOGY
End: 2019-05-29

## 2019-05-29 DIAGNOSIS — R60.0 LOCALIZED EDEMA: ICD-10-CM

## 2019-05-29 DIAGNOSIS — L98.8 OTHER SPECIFIED DISORDERS OF THE SKIN AND SUBCUTANEOUS TISSUE: ICD-10-CM

## 2019-05-29 DIAGNOSIS — L30.9 DERMATITIS, UNSPECIFIED: ICD-10-CM

## 2019-05-29 DIAGNOSIS — L82.1 OTHER SEBORRHEIC KERATOSIS: ICD-10-CM

## 2019-05-29 PROCEDURE — ? ADDITIONAL NOTES

## 2019-05-29 PROCEDURE — ? COUNSELING

## 2019-05-29 PROCEDURE — 99213 OFFICE O/P EST LOW 20 MIN: CPT

## 2019-05-29 ASSESSMENT — LOCATION ZONE DERM: LOCATION ZONE: LEG

## 2019-05-29 ASSESSMENT — LOCATION DETAILED DESCRIPTION DERM
LOCATION DETAILED: LEFT DISTAL PRETIBIAL REGION
LOCATION DETAILED: LEFT LATERAL DISTAL PRETIBIAL REGION
LOCATION DETAILED: LEFT MEDIAL DISTAL PRETIBIAL REGION

## 2019-05-29 ASSESSMENT — LOCATION SIMPLE DESCRIPTION DERM: LOCATION SIMPLE: LEFT PRETIBIAL REGION

## 2019-06-04 NOTE — PROGRESS NOTES
Anticoagulation Summary  As of 6/4/2019    INR goal:   2.0-3.0   TTR:   78.5 % (4 y)   INR used for dosing:   3.60! (6/4/2019)   Warfarin maintenance plan:   2.5 mg (5 mg x 0.5) every Tue, Sat; 5 mg (5 mg x 1) all other days   Weekly warfarin total:   30 mg   Plan last modified:   Sánchez Ochoa, PharmD (6/4/2019)   Next INR check:   6/18/2019   Target end date:   Indefinite    Indications    Chronic anticoagulation [Z79.01]  Paroxysmal atrial fibrillation (HCC) [I48.0]  History of TIA (transient ischemic attack) and stroke (Resolved) [Z86.73]             Anticoagulation Episode Summary     INR check location:   Outside Lab    Preferred lab:       Send INR reminders to:       Comments:         Anticoagulation Care Providers     Provider Role Specialty Phone number    Taz Jc M.D. Referring Cardiology [phone]    Jean Ayala, PharmD Responsible          Anticoagulation Patient Findings  Patient Findings     Negatives:   Signs/symptoms of thrombosis, Signs/symptoms of bleeding, Laboratory test error suspected, Change in health, Change in alcohol use, Change in activity, Upcoming invasive procedure, Emergency department visit, Upcoming dental procedure, Missed doses, Extra doses, Change in medications, Change in diet/appetite, Hospital admission, Bruising, Other complaints        HPI:   Primitivo Milan seen in clinic today, on anticoagulation therapy with warfarin for stroke prevention due to history of PAF and TIA.    Patient's previous INR was supratherapeutic at 3.5 on 5-28-19, at which time patient was instructed to decrease weekly warfarin regimen.  He returns to clinic today to recheck INR to ensure it is therapeutic and thus preventing possible clotting and/or bleeding/bruising complications.    CHADS-VASc = at least 5  (unadjusted ischemic stroke risk/year:  7.2%, which is high risk given hx of TIA)    Does patient have any changes to current medical/health status since last appt (Y/N):  NO  Does  "patient have any signs/symptoms of bleeding and/or thrombosis since the last appt (Y/N):  NO  Does patient have any interval changes to diet or medications since last appt (Y/N):  NO  Are there any complications or cost restrictions with current therapy (Y/N):  NO      Vitals:  /71  HR 62    Weight  Scale unavailable   Height   5' 4\"     Asssessment:      INR remains supratherapeutic at 3.6, therefore increasing patient's risk of bleeding complications.   Reason(s) for out of range INR today:  Dose too high.      Plan:  Instructed patient to HOLD X 1, then decrease weekly warfarin regimen as detailed above in order to bring INR back to therapeutic range.     Follow up:  Because warfarin is a high risk medication and current CHEST guidelines recommend regular monitoring intervals (few days up to 12 weeks), will have patient return to clinic in 2 weeks to recheck INR.    Sánchez Ochoa, PharmD    "

## 2019-06-18 NOTE — PROGRESS NOTES
Anticoagulation Summary  As of 2019    INR goal:   2.0-3.0   TTR:   78.2 % (4 y)   INR used for dosin.40 (2019)   Warfarin maintenance plan:   2.5 mg (5 mg x 0.5) every Tue, Sat; 5 mg (5 mg x 1) all other days   Weekly warfarin total:   30 mg   Plan last modified:   Sánchez Ochoa, PharmD (2019)   Next INR check:   2019   Target end date:   Indefinite    Indications    Chronic anticoagulation [Z79.01]  Paroxysmal atrial fibrillation (HCC) [I48.0]  History of TIA (transient ischemic attack) and stroke (Resolved) [Z86.73]             Anticoagulation Episode Summary     INR check location:   Outside Lab    Preferred lab:       Send INR reminders to:       Comments:         Anticoagulation Care Providers     Provider Role Specialty Phone number    Taz Jc M.D. Referring Cardiology [phone]    Jean Ayala, PharmD Responsible          Anticoagulation Patient Findings      HPI:  Primitivo Milan seen in clinic today, on anticoagulation therapy with warfarin for Afib.   Reason for today's visit (per our collaborative practice policy) is because their last INR was 3.6 on 19. Intervention at the last visit: reduced warfarin dose.   Changes to current medical/health status since last appt: none  No signs/symptoms of bleeding and/or thrombosis since the last appt.    No interval changes to diet or any interval changes to medications since last appt.   No complications or cost restrictions with current therapy.   Declines vitals.  Confirmed dosing regimen.     A/P   INR  therapeutic.     Pt is to continue with current warfarin dosing regimen.     Follow up appointment in 2 weeks to reduce risk of adverse events related to this high risk medication, Warfarin.    Purpose of next visit:    They have a TTR of 78% which is not at target (TTR target/goal is 100%) and requires close follow up to prevent a adverse event (the lower the TTR the higher risk of clots, strokes, or bleeding).      Other info:  Pt educated to contact our clinic with any changes in medications or s/s of bleeding or thrombosis  CHEST guidelines recommend frequent INR monitoring at regular intervals (a few days up to a max of 12 weeks) to ensure they are on the proper dose of warfarin and not having any complications from therapy. INRs can dramatically change over a short time period due to diet, medications, and medical conditions.     Isiah Cast, PharmD

## 2019-07-02 NOTE — PROGRESS NOTES
Anticoagulation Summary  As of 2019    INR goal:   2.0-3.0   TTR:   78.4 % (4.1 y)   INR used for dosin.90 (2019)   Warfarin maintenance plan:   2.5 mg (5 mg x 0.5) every Tue, Sat; 5 mg (5 mg x 1) all other days   Weekly warfarin total:   30 mg   Plan last modified:   Sánchez Ochoa, PharmD (2019)   Next INR check:   2019   Target end date:   Indefinite    Indications    Chronic anticoagulation [Z79.01]  Paroxysmal atrial fibrillation (HCC) [I48.0]  History of TIA (transient ischemic attack) and stroke (Resolved) [Z86.73]             Anticoagulation Episode Summary     INR check location:   Outside Lab    Preferred lab:       Send INR reminders to:       Comments:         Anticoagulation Care Providers     Provider Role Specialty Phone number    Taz Jc M.D. Referring Cardiology [phone]    Jean Ayala, PharmD Responsible          Anticoagulation Patient Findings  Patient Findings     Negatives:   Signs/symptoms of thrombosis, Signs/symptoms of bleeding, Laboratory test error suspected, Change in health, Change in alcohol use, Change in activity, Upcoming invasive procedure, Emergency department visit, Upcoming dental procedure, Missed doses, Extra doses, Change in medications, Change in diet/appetite, Hospital admission, Bruising, Other complaints        HPI:   Primitivo Milan seen in clinic today, on anticoagulation therapy with warfarin for stroke prevention due to history of PAF and TIA/CVA.    Patient's previous INR was therapeutic at 2.4 on 19, at which time patient was instructed to continue with current warfarin regimen.  He returns to clinic today to recheck INR to ensure it is therapeutic and thus preventing possible clotting and/or bleeding/bruising complications.    CHADS-VASc = at least 5  (unadjusted ischemic stroke risk/year:  7.2%, which is high risk given hx of TIA/CVA)    Does patient have any changes to current medical/health status since last appt (Y/N):  " NO  Does patient have any signs/symptoms of bleeding and/or thrombosis since the last appt (Y/N):  NO  Does patient have any interval changes to diet or medications since last appt (Y/N):  NO  Are there any complications or cost restrictions with current therapy (Y/N):  NO     Does patient have [facility] PCP? YES, Vicki SHIPLEY (If not, please document discussion that patient must be seen at [facility])       Vitals:  /71  HR 63    Weight  Scale unavailable   Height   5' 4\"     Asssessment:      INR remains therapeutic at 2.9, therefore decreasing patient's risk of stroke and/or bleeding complications.   Reason(s) for out of range INR today:  n/a      Plan:  Pt is to continue with current warfarin dosing regimen in order to maintain INR in therapeutic range.     Follow up:  Because warfarin is a high risk medication and current CHEST guidelines recommend regular monitoring intervals (few days up to 12 weeks), will have patient return to clinic in 4 weeks to recheck INR (per patient request).    Sánchez Ochoa, PharmD, BCACP    "

## 2019-07-30 NOTE — PROGRESS NOTES
Anticoagulation Summary  As of 2019    INR goal:   2.0-3.0   TTR:   78.8 % (4.2 y)   INR used for dosin.20 (2019)   Warfarin maintenance plan:   2.5 mg (5 mg x 0.5) every Tue, Sat; 5 mg (5 mg x 1) all other days   Weekly warfarin total:   30 mg   Plan last modified:   Sánchez Ochoa, PharmD (2019)   Next INR check:   2019   Target end date:   Indefinite    Indications    Chronic anticoagulation [Z79.01]  Paroxysmal atrial fibrillation (HCC) [I48.0]  History of TIA (transient ischemic attack) and stroke (Resolved) [Z86.73]             Anticoagulation Episode Summary     INR check location:   Outside Lab    Preferred lab:       Send INR reminders to:       Comments:         Anticoagulation Care Providers     Provider Role Specialty Phone number    Taz Jc M.D. Referring Cardiology [phone]    Jean Ayala, PharmD Responsible          Anticoagulation Patient Findings  Patient Findings     Negatives:   Signs/symptoms of thrombosis, Signs/symptoms of bleeding, Laboratory test error suspected, Change in health, Change in alcohol use, Change in activity, Upcoming invasive procedure, Emergency department visit, Upcoming dental procedure, Missed doses, Extra doses, Change in medications, Change in diet/appetite, Hospital admission, Bruising, Other complaints        HPI:   Primitivo Milan seen in clinic today, on anticoagulation therapy with warfarin for stroke prevention due to history of PAF and TIA.    Patient's previous INR was therapeutic at 2.9 on 19, at which time patient was instructed to continue with current warfarin regimen.  He returns to clinic today to recheck INR to ensure it is therapeutic and thus preventing possible clotting and/or bleeding/bruising complications.    CHADS-VASc = at least 5  (unadjusted ischemic stroke risk/year:  7.2%, which is high risk given hx of TIA)    Does patient have any changes to current medical/health status since last appt (Y/N):   "NO  Does patient have any signs/symptoms of bleeding and/or thrombosis since the last appt (Y/N):  NO  Does patient have any interval changes to diet or medications since last appt (Y/N):  NO  Are there any complications or cost restrictions with current therapy (Y/N):  NO     Does patient have [facility] PCP? YES, Vicki SHIPLEY (If not, please document discussion that patient must be seen at [facility])       Vitals:  /60  HR 60    Weight  Scale unavailable   Height   5' 4\"     Asssessment:      INR remains therapeutic at 2.2, therefore decreasing patient's risk of stroke and/or bleeding complications.   Reason(s) for out of range INR today:  n/a      Plan:  Pt is to continue with current warfarin dosing regimen in order to maintain INR in therapeutic range.     Follow up:  Because warfarin is a high risk medication and current CHEST guidelines recommend regular monitoring intervals (few days up to 12 weeks), will have patient return to clinic in 4 weeks to recheck INR.    Sánchez Ochoa, PharmD, BCACP    "

## 2019-08-07 NOTE — PROGRESS NOTES
"Subjective:      Primitivo Milan is a 89 y.o. male who presents with Leg Pain (L lower leg [id number] )        Patient is accompanied by his daughter.     Rash   This is a new problem. The current episode started more than 1 month ago (2 months ). The problem has been waxing and waning since onset. The affected locations include the left lower leg. The rash is characterized by redness, itchiness and draining. He was exposed to nothing. Pertinent negatives include no congestion, diarrhea, fever, joint pain, nail changes, shortness of breath or vomiting. Past treatments include topical steroids. The treatment provided mild relief. There is no history of allergies, asthma or eczema.     Patient's daughter reports he's had a rash on his left lower extremity that has been present on and off for 2 months. No pain, warmth, or thick discharge from the site. No history of skin problems. He is otherwise feeling well and denies fevers, chills, body aches, nausea, vomiting, chest pain, or difficulty breathing.     Review of Systems   Constitutional: Negative for chills and fever.   HENT: Negative for congestion.    Respiratory: Negative for shortness of breath.    Cardiovascular: Positive for leg swelling. Negative for chest pain and palpitations.   Gastrointestinal: Negative for abdominal pain, diarrhea, nausea and vomiting.   Genitourinary: Negative.    Musculoskeletal: Negative.  Negative for joint pain.   Skin: Positive for itching and rash. Negative for nail changes.   Neurological: Negative for dizziness.        Objective:     /88   Pulse 84   Temp 36.4 °C (97.5 °F) (Temporal)   Resp 16   Ht 1.676 m (5' 6\")   Wt 76.2 kg (168 lb)   SpO2 95%   BMI 27.12 kg/m²      Physical Exam   Constitutional: He is oriented to person, place, and time. He appears well-developed and well-nourished. No distress.   HENT:   Head: Normocephalic and atraumatic.   Eyes: Pupils are equal, round, and reactive to light. " Conjunctivae are normal.   Neck: Normal range of motion.   Cardiovascular: Normal rate, regular rhythm and normal heart sounds.   No murmur heard.  Pulmonary/Chest: Effort normal and breath sounds normal. No stridor. No respiratory distress. He has no wheezes.   Musculoskeletal: Normal range of motion.   Neurological: He is alert and oriented to person, place, and time.   Skin: Skin is warm and dry. Rash noted. He is not diaphoretic.   1+ lower extremity pitting edema bilaterally. Rough erythematous skin present on anterior aspect of left lower extremity. No overlying lesions noted. No warmth to touch.    Psychiatric: He has a normal mood and affect. His behavior is normal.   Nursing note and vitals reviewed.         PMH:  has a past medical history of Anticoagulation monitoring, special range, Atrial fibrillation ([facility]), Atrioventricular block, complete (HCC), Bell's palsy, Cancer ([facility]) (2001), Essential hypertension, Extranodal lymphoma (HCC), History of TIA (transient ischemic attack) and stroke, TIA (transient ischemic attack), Kidney mass, Laceration of left ring finger (4/17/2019), Moderate aortic stenosis (10/2018), Other dyspnea and respiratory abnormality, Other malignant lymphomas, unspecified site, extranodal and solid organ sites, Paroxysmal atrial fibrillation (HCC), Personal history of malignant neoplasm of prostate, Personal history of venous thrombosis and embolism, Pneumonia due to infectious organism (12/11/2018), Presence of permanent cardiac pacemaker (09/2009), Prostate cancer ([facility]) (1991), Renal disorder (1970), Scalp laceration (4/17/2019), Sleep apnea, Stroke ([facility]) (1991), TIA (transient ischemic attack), and Trauma (4/17/2019).  MEDS:   Current Outpatient Medications:   •  triamcinolone (ARISTOCORT) 0.5 % ointment, Apply 1 Application to affected area(s) 2 Times a Day., Disp: 1 Tube, Rfl: 0  •  warfarin (COUMADIN) 5 MG Tab, TAKE 1-1.5 TABLETS BY MOUTH OR AS DIRECTED BY COUMADIN CLINIC, Disp:  135 Tab, Rfl: 1  •  DILTIAZem CD (CARDIZEM CD) 240 MG CAPSULE SR 24 HR, TAKE 1 CAPSULE BY MOUTH EVERY DAY, Disp: 90 Cap, Rfl: 2  •  Cetirizine HCl 10 MG Cap, Take 1 tablet by mouth every day at 6 PM., Disp: , Rfl:   •  Cholecalciferol (VITAMIN D3) 5000 units Tab, Take 5,000 Units by mouth every day at 6 PM., Disp: , Rfl:   •  losartan (COZAAR) 25 MG Tab, TAKE 1 TABLET BY MOUTH EVERY DAY, Disp: 90 Tab, Rfl: 1  •  Magnesium 400 MG Tab, Take 400 mg by mouth every day., Disp: , Rfl:   ALLERGIES:   Allergies   Allergen Reactions   • Iodine      SURGHX:   Past Surgical History:   Procedure Laterality Date   • RECOVERY  5/19/2011    Performed by SURGERY, IR-RECOVERY at SURGERY SAME DAY [facility] ORS   • PACEMAKER INSERTION  September 2009    Medtronic Versa [id number] implanted by Dr. Lexa Lopes.   • EYE SURGERY  2006    left eye mass/ cancer/ s/p radiation   • LITHOTRIPSY  1995    left kidney x 3   • PROSTATECTOMY, RADICAL RETRO  1991   • EYE SURGERY       SOCHX:  reports that he has never smoked. He has never used smokeless tobacco. He reports that he does not drink alcohol or use drugs.  FH: family history includes Heart Attack in his father and mother; Heart Disease in his father and mother; No Known Problems in his maternal grandfather, maternal grandmother, paternal grandfather, and paternal grandmother.       Assessment/Plan:     1. Venous stasis dermatitis of left lower extremity  - triamcinolone (ARISTOCORT) 0.5 % ointment; Apply 1 Application to affected area(s) 2 Times a Day.  Dispense: 1 Tube; Refill: 0    Slight pitting edema present bilaterally. Encouraged salt restriction, use of compressive stockings, and elevation of the legs. Call or return to office if symptoms persist or worsen. The patient demonstrated a good understanding and agreed with the treatment plan.

## 2019-08-30 NOTE — PROGRESS NOTES
Anticoagulation Summary  As of 2019    INR goal:   2.0-3.0   TTR:   79.3 % (4.2 y)   INR used for dosin.50 (2019)   Warfarin maintenance plan:   2.5 mg (5 mg x 0.5) every Tue, Sat; 5 mg (5 mg x 1) all other days   Weekly warfarin total:   30 mg   Plan last modified:   Sánchez Ochoa, PharmD (2019)   Next INR check:   10/8/2019   Target end date:   Indefinite    Indications    Chronic anticoagulation [Z79.01]  Paroxysmal atrial fibrillation (HCC) [I48.0]  History of TIA (transient ischemic attack) and stroke (Resolved) [Z86.73]             Anticoagulation Episode Summary     INR check location:   Outside Lab    Preferred lab:       Send INR reminders to:       Comments:         Anticoagulation Care Providers     Provider Role Specialty Phone number    Taz Jc M.D. Referring Cardiology [phone]    Jean Ayala, PharmD Responsible          Anticoagulation Patient Findings  Patient Findings     Negatives:   Signs/symptoms of thrombosis, Signs/symptoms of bleeding, Laboratory test error suspected, Change in health, Change in alcohol use, Change in activity, Upcoming invasive procedure, Emergency department visit, Upcoming dental procedure, Missed doses, Extra doses, Change in medications, Change in diet/appetite, Hospital admission, Bruising, Other complaints        HPI:   Primitivo Milan seen in clinic today, on anticoagulation therapy with warfarin for stroke prevention due to history of PAF and TIA.    Patient's previous INR was therapeutic at 2.2 on 19, at which time patient was instructed to continue with current warfarin regimen.  He returns to clinic today to recheck INR to ensure it is therapeutic and thus preventing possible clotting and/or bleeding/bruising complications.    CHADS-VASc = at least 5  (unadjusted ischemic stroke risk/year:  7.2%, which is high risk given hx of TIA)    Does patient have any changes to current medical/health status since last appt (Y/N):   "NO  Does patient have any signs/symptoms of bleeding and/or thrombosis since the last appt (Y/N):  NO  Does patient have any interval changes to diet or medications since last appt (Y/N):  NO  Are there any complications or cost restrictions with current therapy (Y/N):  NO     Does patient have [facility] PCP? YES, Vicki SHIPLEY (If not, please document discussion that patient must be seen at [facility])       Vitals:  /68  HR 60    Weight  declines   Height   5' 4\"     Asssessment:      INR remains therapeutic at 2.5, therefore decreasing patient's risk of stroke and/or bleeding complications.   Reason(s) for out of range INR today:  n/a      Plan:  Pt is to continue with current warfarin dosing regimen in order to maintain INR in therapeutic range.     Follow up:  Because warfarin is a high risk medication and current CHEST guidelines recommend regular monitoring intervals (few days up to 12 weeks), will have patient return to clinic in 5 weeks to recheck INR.    Sánchez Ochoa, PharmD, BCACP    "

## 2019-09-23 NOTE — TELEPHONE ENCOUNTER
Was the patient seen in the last year in this department? Yes    Does patient have an active prescription for medications requested? No     Received Request Via: Pharmacy      Pt met protocol?: Yes   Pt last ov with current pcp 5/2019 ,last written by Aj will forward to pcp   BP Readings from Last 1 Encounters:   08/30/19 123/68

## 2019-09-24 NOTE — TELEPHONE ENCOUNTER
Requested Prescriptions     Pending Prescriptions Disp Refills   • losartan (COZAAR) 25 MG Tab [Pharmacy Med Name: LOSARTAN POTASSIUM 25 MG TAB] 100 Tab 1     Sig: TAKE 1 TABLET BY MOUTH EVERY DAY       BRITNEY Vaca.

## 2019-10-08 NOTE — PROGRESS NOTES
Subjective:   Primitivo Milan is a 89 y.o. male who presents for Cough (x 3 days, wheezing cough, congestion )        Patient presents with daughter.  Patient complains of 3 days of cough and sinus congestion.  He states that he gets the symptoms around this time every year.  He had similar symptoms last year and was treated with antibiotics with complete symptomatic resolution. PMH significant for aortic regurgitation. Pacemaker. Following with cardiology.    Cough   This is a new problem. The current episode started in the past 7 days (3 days). The problem has been gradually worsening. The problem occurs constantly. The cough is productive of sputum. Associated symptoms include nasal congestion, postnasal drip and wheezing. Pertinent negatives include no chest pain, ear congestion, ear pain, fever, headaches, sore throat or shortness of breath. Nothing aggravates the symptoms. He has tried nothing for the symptoms. His past medical history is significant for bronchitis and pneumonia. There is no history of COPD or emphysema.     Review of Systems   Constitutional: Negative for fever.   HENT: Positive for postnasal drip. Negative for ear pain and sore throat.    Respiratory: Positive for cough, sputum production and wheezing. Negative for shortness of breath.    Cardiovascular: Negative for chest pain and palpitations.   Gastrointestinal: Negative for nausea and vomiting.   Neurological: Negative for headaches.       PMH:  has a past medical history of Anticoagulation monitoring, special range, Atrial fibrillation (HCC), Atrioventricular block, complete (HCC), Bell's palsy, Cancer (HCC) (2001), Essential hypertension, Extranodal lymphoma (HCC), History of TIA (transient ischemic attack) and stroke, TIA (transient ischemic attack), Kidney mass, Laceration of left ring finger (4/17/2019), Moderate aortic stenosis (10/2018), Other dyspnea and respiratory abnormality, Other malignant lymphomas, unspecified site,  extranodal and solid organ sites, Paroxysmal atrial fibrillation (HCC), Personal history of malignant neoplasm of prostate, Personal history of venous thrombosis and embolism, Pneumonia due to infectious organism (12/11/2018), Presence of permanent cardiac pacemaker (09/2009), Prostate cancer (HCC) (1991), Renal disorder (1970), Scalp laceration (4/17/2019), Sleep apnea, Stroke (HCC) (1991), TIA (transient ischemic attack), and Trauma (4/17/2019).  MEDS: No current facility-administered medications for this visit.   No current outpatient medications on file.    Facility-Administered Medications Ordered in Other Visits:   •  DILTIAZem CD (CARDIZEM CD) capsule 240 mg, 240 mg, Oral, QDAY, Chilango Bales M.D., 240 mg at 10/09/19 0513  •  losartan (COZAAR) tablet 25 mg, 25 mg, Oral, QDAY, Chilango Bales M.D., 25 mg at 10/09/19 0514  •  MD Alert...Warfarin per Pharmacy, , Other, PHARMACY TO DOSE, Chilango Bales M.D.  •  furosemide (LASIX) injection 40 mg, 40 mg, Intravenous, BID DIURETIC, Chilango Bales M.D., 40 mg at 10/09/19 0513  •  [START ON 10/12/2019] warfarin (COUMADIN) tablet 2.5 mg, 2.5 mg, Oral, Once per day on Tue Sat, Chilango Bales M.D.  •  [START ON 10/10/2019] warfarin (COUMADIN) tablet 5 mg, 5 mg, Oral, Once per day on Sun Mon Wed Thu Fri, Chilango Bales M.D.  •  senna-docusate (PERICOLACE or SENOKOT S) 8.6-50 MG per tablet 2 Tab, 2 Tab, Oral, BID, 2 Tab at 10/09/19 0514 **AND** polyethylene glycol/lytes (MIRALAX) PACKET 1 Packet, 1 Packet, Oral, QDAY PRN **AND** magnesium hydroxide (MILK OF MAGNESIA) suspension 30 mL, 30 mL, Oral, QDAY PRN **AND** bisacodyl (DULCOLAX) suppository 10 mg, 10 mg, Rectal, QDAY PRN, Chilango Bales M.D.  •  Respiratory Care per Protocol, , Nebulization, Continuous RT, Chilango Bales M.D.  •  acetaminophen (TYLENOL) tablet 650 mg, 650 mg, Oral, Q6HRS PRN, Chilango Bales M.D.  •  azithromycin (ZITHROMAX) tablet 500 mg, 500 mg, Oral, DAILY, Chilango Bales M.D., 500 mg at 10/09/19 0009  •   "ipratropium-albuterol (DUONEB) nebulizer solution, 3 mL, Nebulization, Q4HRS (RT), Chilango Bales M.D., 3 mL at 10/09/19 0734  •  predniSONE (DELTASONE) tablet 40 mg, 40 mg, Oral, DAILY, Chilango Bales M.D., 40 mg at 10/09/19 0514  ALLERGIES:   Allergies   Allergen Reactions   • Iodine      SURGHX:   Past Surgical History:   Procedure Laterality Date   • RECOVERY  5/19/2011    Performed by SURGERY, IR-RECOVERY at SURGERY SAME DAY [facility]   • PACEMAKER INSERTION  September 2009    Medtronic Versa [id number] implanted by Dr. Lexa Lopes.   • EYE SURGERY  2006    left eye mass/ cancer/ s/p radiation   • LITHOTRIPSY  1995    left kidney x 3   • PROSTATECTOMY, RADICAL RETRO  1991   • EYE SURGERY       SOCHX:  reports that he has never smoked. He has never used smokeless tobacco. He reports that he does not drink alcohol or use drugs.  FH: Family history was reviewed, no pertinent findings to report   Objective:   /70   Pulse 60   Temp 36.6 °C (97.9 °F) (Temporal)   Resp 20   Ht 1.676 m (5' 6\")   Wt 76.7 kg (169 lb)   SpO2 91%   BMI 27.28 kg/m²   Physical Exam   Constitutional: He is oriented to person, place, and time. He appears well-developed and well-nourished.  Non-toxic appearance. No distress.   HENT:   Head: Normocephalic and atraumatic.   Right Ear: External ear normal.   Left Ear: External ear normal.   Nose: Mucosal edema and rhinorrhea present.   Mouth/Throat: Uvula is midline and mucous membranes are normal. Posterior oropharyngeal erythema present.   Eyes: Conjunctivae and lids are normal.   Neck: Neck supple.   Cardiovascular: Normal rate, regular rhythm, S1 normal and S2 normal. Exam reveals no gallop and no friction rub.   Murmur heard.  3/6 holosystolic murmur   Pulmonary/Chest: Effort normal. No respiratory distress. He has no decreased breath sounds. He has wheezes. He has no rhonchi. He has no rales.   Persistent wet cough.  Diffuse expiratory wheezes, bilaterally. Wheezing audible across " room at times.   Abdominal: Normal appearance.   Musculoskeletal:   Normal range of motion. Exhibits no edema and no tenderness.    Neurological: He is alert and oriented to person, place, and time. No cranial nerve deficit or sensory deficit.   Skin: Skin is warm, dry and intact. Capillary refill takes less than 2 seconds.   Psychiatric: He has a normal mood and affect. His speech is normal and behavior is normal. Judgment and thought content normal. Cognition and memory are normal.   Vitals reviewed.        Assessment/Plan:   1. Bronchitis    2. Cough  - DX-CHEST-2 VIEWS; Future    3. Moderate aortic stenosis    4. Paroxysmal atrial fibrillation (HCC)    1605: MA reports pt's sp02 as 88%. I rechecked-patient's oxygen saturation 90-91%.  Patient's daughter states that his oxygen saturation is typically between 90 and 92.  Patient was placed on 2 L/min of oxygen-oxygen saturation 95%.   1630: Pt to imaging for CXR  1700: Pt rechecked.  1715: Pt walked without supplemental oxygen. SpO2 stable at 90-91%. CXR reviewed with pt and daughter. CXR and concerns discussed.    1725: Pt departed clinic.  Will go to [facility].    10/8/2019 4:28 PM    HISTORY/REASON FOR EXAM:  Cough for 2 days.      TECHNIQUE/EXAM DESCRIPTION AND NUMBER OF VIEWS:  Two views of the chest.    COMPARISON:  11/30/2018    FINDINGS:  Cardiac silhouette is borderline enlarged.  Small bilateral pleural effusions are noted. Right-sided blunting of the costophrenic angle is new.  There are increased linear and poorly defined opacifications in each lung base. Small area of focal consolidation is again noted in the left upper pulmonary lobe.        Impression       1.  Increase in perihilar and basilar pulmonary opacifications could be due to pulmonary edema or inflammation such as bronchitis or pneumonitis.    2.  Small bilateral pleural fluid collections or increased compared to the prior exam.    3.  Probable scarring in left upper lung is again identified.         Patient and daughter advised that chest x-ray is concerning for bronchitis. Pneumonia is also a consideration.  Additionally findings could be secondary to pulmonary edema, which may indicate a cardiac issue.  I am also concerned by the new right-sided pleural effusion.  I do recommend that the patient be further evaluated and treated as an inpatient. Hi spO2 is stable at this time and pt is safe for transport via POV, directly.    Differential diagnosis, natural history, supportive care, and indications for immediate follow-up discussed.

## 2019-10-08 NOTE — PROGRESS NOTES
Anticoagulation Summary  As of 10/8/2019    INR goal:   2.0-3.0   TTR:   78.5 % (4.3 y)   INR used for dosing:   3.50! (10/8/2019)   Warfarin maintenance plan:   2.5 mg (5 mg x 0.5) every Tue, Sat; 5 mg (5 mg x 1) all other days   Weekly warfarin total:   30 mg   Plan last modified:   Sánchez Ochoa, PharmD (6/4/2019)   Next INR check:   10/22/2019   Target end date:   Indefinite    Indications    Chronic anticoagulation [Z79.01]  Paroxysmal atrial fibrillation (HCC) [I48.0]  History of TIA (transient ischemic attack) and stroke (Resolved) [Z86.73]             Anticoagulation Episode Summary     INR check location:   Outside Lab    Preferred lab:       Send INR reminders to:       Comments:         Anticoagulation Care Providers     Provider Role Specialty Phone number    Taz Jc M.D. Referring Cardiology [phone]    Jean Ayala, PharmD Responsible          Anticoagulation Patient Findings  Patient Findings     Positives:   Change in health    Negatives:   Signs/symptoms of thrombosis, Signs/symptoms of bleeding, Laboratory test error suspected, Change in alcohol use, Change in activity, Upcoming invasive procedure, Emergency department visit, Upcoming dental procedure, Missed doses, Extra doses, Change in medications, Change in diet/appetite, Hospital admission, Bruising, Other complaints    Comments:   Cough/cold symptoms X two-three days        HPI:   Primitivo Milan seen in clinic today, on anticoagulation therapy with warfarin for stroke prevention due to history of PAF and TIA.    Patient's previous INR was therapeutic at 2.5 on 8-30-19, at which time patient was instructed to continue with current warfarin regimen.  He returns to clinic today to recheck INR to ensure it is therapeutic and thus preventing possible clotting and/or bleeding/bruising complications.    CHADS-VASc = at least 5  (unadjusted ischemic stroke risk/year:  7.2%, which is high risk given hx of TIA)    Does patient have any  changes to current medical/health status since last appt (Y/N):  Has a cold and cough this past week, not feeling well  Does patient have any signs/symptoms of bleeding and/or thrombosis since the last appt (Y/N):  NO  Does patient have any interval changes to diet or medications since last appt (Y/N):  NO  Are there any complications or cost restrictions with current therapy (Y/N):  NO     Does patient have [facility] PCP? YES, Vicki SHIPLEY (If not, please document discussion that patient must be seen at [facility])       Vitals:  declined at today's visit.    There were no vitals filed for this visit.     Asssessment:      INR supratherapeutic at 3.5, therefore increasing patient's risk of bleeding complications.   Reason(s) for out of range INR today:  May due to recent onset of cough and cold symptoms.      Plan:  Instructed patient to decrease tomorrow's dose to 2.5mg (already took today's), then resume current warfarin regimen in order to bring INR to therapeutic range.  Patient was planning on going to urgent care to be evaluated, asked that they contact our clinic should he start any antibiotics or steroids.     Follow up:  Because warfarin is a high risk medication and current CHEST guidelines recommend regular monitoring intervals (few days up to 12 weeks), will have patient return to clinic in 2 weeks to recheck INR.    Sánchez Ochoa, PharmD, BCACP

## 2019-10-09 PROBLEM — R79.89 ELEVATED TROPONIN: Status: ACTIVE | Noted: 2019-01-01

## 2019-10-09 PROBLEM — I50.33 ACUTE ON CHRONIC DIASTOLIC CONGESTIVE HEART FAILURE (HCC): Status: ACTIVE | Noted: 2019-01-01

## 2019-10-09 PROBLEM — J45.901 REACTIVE AIRWAY DISEASE WITH ACUTE EXACERBATION: Status: ACTIVE | Noted: 2019-01-01

## 2019-10-09 PROBLEM — J96.01 ACUTE RESPIRATORY FAILURE WITH HYPOXIA (HCC): Status: ACTIVE | Noted: 2019-01-01

## 2019-10-09 PROBLEM — J20.9 BRONCHITIS, ACUTE, WITH BRONCHOSPASM: Status: ACTIVE | Noted: 2019-01-01

## 2019-10-09 NOTE — THERAPY
"Physical Therapy Evaluation completed.   Bed Mobility:  Supine to Sit: Minimal Assist(slightly raised HOB)  Transfers: Sit to Stand: Contact Guard Assist(with HHA)  Gait: Level Of Assist: Minimal Assist with hand hold assist     Plan of Care: Will benefit from Physical Therapy 4 times per week  Discharge Recommendations: Equipment: Will Continue to Assess for Equipment Needs  (anticipate none, possible FWW)    Pt presents with impaired dynamic balance and gait mechanics associated with c/c of SOB found to have diastolic CHF exacerbation requiring diuresis. Pt is most limited by immobility over the last few days though is able to mobilize a household distance; pt is a fall risk but has not happen since [facility] in 4/2019 and has been 'very careful' and completed home health therapy. From a PT perspective, once medically optimized would recommend home dc as he is the caregiver for his spouse who is on hospice though does have 2 adult daughters support. Deconditioning will happen quickly given his advanced age therefore please continue to reinforce mobility outside of therapy sessions. will continue to follow while in house.        See \"Rehab Therapy-Acute\" Patient Summary Report for complete documentation.     "

## 2019-10-09 NOTE — H&P
Hospital Medicine History & Physical Note    Date of Service  10/8/2019    Primary Care Physician  PRICE Vaca    Consultants  None    Code Status  DNR/DNI    Chief Complaint  Shortness of breath    History of Presenting Illness  89 y.o. male with a past medical history of atrial fibrillation on Coumadin, hypertension, congestive heart failure with diastolic dysfunction, moderate aortic stenosis, stroke who presented 10/8/2019 with shortness of breath for the past 2 days.  The patient reports a productive cough with dyspnea and wheezing.  He denies any chest pain, fevers, chills or lightheadedness.  He denies any history of asthma or COPD.  He is a never smoker.  Patient presented to an urgent care and underwent a chest x-ray that revealed increasing perihilar and basilar pulmonary opacifications, could be due to pulmonary edema or inflammation such as bronchitis or pneumonitis.  Small bilateral pleural fluid collections are increased compared to prior exam.  He was transferred to [facility] for further care.  In the ER the patient was noted to be hypoxic and was placed on 3 L of oxygen.    EKG interpreted by me reveals sinus rhythm with nonspecific intraventricular conduction delay.  No ST elevation or ST depression noted    Review of Systems  Review of Systems   Constitutional: Negative for chills, diaphoresis and fever.   HENT: Negative for hearing loss and sore throat.    Eyes: Negative for blurred vision.   Respiratory: Positive for cough, sputum production, shortness of breath and wheezing.    Cardiovascular: Negative for chest pain, palpitations and leg swelling.   Gastrointestinal: Negative for abdominal pain, blood in stool, diarrhea, nausea and vomiting.   Genitourinary: Negative for dysuria, flank pain and urgency.   Musculoskeletal: Negative for back pain, joint pain, myalgias and neck pain.   Skin: Negative for rash.   Neurological: Negative for dizziness, focal weakness, seizures and  headaches.   Endo/Heme/Allergies: Does not bruise/bleed easily.   Psychiatric/Behavioral: Negative for suicidal ideas.   All other systems reviewed and are negative.      Past Medical History   has a past medical history of Anticoagulation monitoring, special range, Atrial fibrillation (HCC), Atrioventricular block, complete (HCC), Bell's palsy, Cancer (HCC) (2001), Essential hypertension, Extranodal lymphoma (HCC), History of TIA (transient ischemic attack) and stroke, TIA (transient ischemic attack), Kidney mass, Laceration of left ring finger (4/17/2019), Moderate aortic stenosis (10/2018), Other dyspnea and respiratory abnormality, Other malignant lymphomas, unspecified site, extranodal and solid organ sites, Paroxysmal atrial fibrillation (HCC), Personal history of malignant neoplasm of prostate, Personal history of venous thrombosis and embolism, Pneumonia due to infectious organism (12/11/2018), Presence of permanent cardiac pacemaker (09/2009), Prostate cancer ([facility]) (1991), Renal disorder (1970), Scalp laceration (4/17/2019), Sleep apnea, Stroke ([facility]) (1991), TIA (transient ischemic attack), and Trauma (4/17/2019).    Surgical History   has a past surgical history that includes lithotripsy (1995); prostatectomy, radical retro (1991); eye surgery (2006); recovery (5/19/2011); eye surgery; and pacemaker insertion (September 2009).     Family History  family history includes Heart Attack in his father and mother; Heart Disease in his father and mother; No Known Problems in his maternal grandfather, maternal grandmother, paternal grandfather, and paternal grandmother.     Social History   reports that he has never smoked. He has never used smokeless tobacco. He reports that he does not drink alcohol or use drugs.    Allergies  Allergies   Allergen Reactions   • Iodine        Medications  Prior to Admission Medications   Prescriptions Last Dose Informant Patient Reported? Taking?   Cetirizine HCl 10 MG Cap   Yes  No   Sig: Take 1 tablet by mouth every day at 6 PM.   Cholecalciferol (VITAMIN D3) 5000 units Tab   Yes No   Sig: Take 5,000 Units by mouth every day at 6 PM.   DILTIAZem CD (CARDIZEM CD) 240 MG CAPSULE SR 24 HR   No No   Sig: TAKE 1 CAPSULE BY MOUTH EVERY DAY   Magnesium 400 MG Tab  Patient Yes No   Sig: Take 400 mg by mouth every day.   losartan (COZAAR) 25 MG Tab   No No   Sig: TAKE 1 TABLET BY MOUTH EVERY DAY   triamcinolone (ARISTOCORT) 0.5 % ointment   No No   Sig: Apply 1 Application to affected area(s) 2 Times a Day.   Patient not taking: Reported on 10/8/2019   warfarin (COUMADIN) 5 MG Tab   No No   Sig: TAKE 1-1.5 TABLETS BY MOUTH OR AS DIRECTED BY COUMADIN CLINIC      Facility-Administered Medications: None       Physical Exam  Temp:  [36.8 °C (98.2 °F)] 36.8 °C (98.2 °F)  Pulse:  [60-79] 60  Resp:  [16-18] 18  BP: (130-146)/(61-83) 130/67  SpO2:  [91 %-92 %] 92 %    Physical Exam   Constitutional: He is oriented to person, place, and time. He appears well-developed and well-nourished. No distress.   HENT:   Head: Normocephalic and atraumatic.   Mouth/Throat: Oropharynx is clear and moist.   Eyes: Pupils are equal, round, and reactive to light. Conjunctivae are normal. No scleral icterus.   Neck: Normal range of motion. Neck supple.   Cardiovascular: Normal rate, regular rhythm and normal heart sounds.   Pulmonary/Chest: He has wheezes (Diffuse end expiratory).   Scattered rhonchi   Abdominal: Soft. Bowel sounds are normal. He exhibits no distension. There is no tenderness. There is no rebound.   Musculoskeletal: Normal range of motion. He exhibits edema (+1 pitting edema bilaterally in lower extremities). He exhibits no tenderness.   Lymphadenopathy:     He has no cervical adenopathy.   Neurological: He is alert and oriented to person, place, and time. No cranial nerve deficit. Coordination normal.   Skin: Skin is warm. No rash noted.   Psychiatric: He has a normal mood and affect. His behavior is  normal.   Nursing note and vitals reviewed.      Laboratory:  Recent Labs     10/08/19  1823   WBC 6.8   RBC 4.56*   HEMOGLOBIN 14.8   HEMATOCRIT 45.3   MCV 99.3*   MCH 32.5   MCHC 32.7*   RDW 59.1*   PLATELETCT 175   MPV 10.0     Recent Labs     10/08/19  1823   SODIUM 142   POTASSIUM 4.5   CHLORIDE 112   CO2 23   GLUCOSE 96   BUN 35*   CREATININE 1.55*   CALCIUM 9.5     Recent Labs     10/08/19  1823   ALTSGPT 17   ASTSGOT 34   ALKPHOSPHAT 84   TBILIRUBIN 1.3   GLUCOSE 96     Recent Labs     10/08/19  1541   INR 3.50     Recent Labs     10/08/19  1823   NTPROBNP 1668*         Recent Labs     10/08/19  1823   TROPONINT 33*       Urinalysis:    No results found     Imaging:  No orders to display         Assessment/Plan:  I anticipate this patient will require at least two midnights for appropriate medical management, necessitating inpatient admission.    Acute on chronic diastolic congestive heart failure (HCC)- (present on admission)  Assessment & Plan  I started the patient on IV Lasix  Fluid and salt restriction  Check 2D echo    Acute respiratory failure with hypoxia (HCC)- (present on admission)  Assessment & Plan  Secondary to diastolic CHF exacerbation and bronchitis  Patient has been started on 2 L of supplemental oxygen  RT protocol    Bronchitis, acute, with bronchospasm- (present on admission)  Assessment & Plan  Patient has been started on steroids, scheduled DuoNeb nebulized breathing treatment, RT protocol and supplemental oxygen  I have started the patient on azithromycin.  Check procalcitonin  Influenza negative      Paroxysmal atrial fibrillation (HCC)- (present on admission)  Assessment & Plan  Continue Cardizem and Coumadin    Essential hypertension- (present on admission)  Assessment & Plan  Continue Cardizem and losartan    Moderate aortic stenosis- (present on admission)  Assessment & Plan  Monitor volume and respiratory status closely  Check 2D echo    History of TIA (transient ischemic  attack) and stroke- (present on admission)  Assessment & Plan  Continue Coumadin    Elevated troponin- (present on admission)  Assessment & Plan  Type II NSTEMI in the setting of acute respiratory failure with hypoxia  Patient denies active chest pain  Continuous cardiac monitoring with serial EKG and troponin      VTE prophylaxis: Warfarin

## 2019-10-09 NOTE — ASSESSMENT & PLAN NOTE
Secondary to diastolic CHF exacerbation and bronchitis  Continue respiratory care per protocol  Continue oxygen per protocol,      Diuresis to po

## 2019-10-09 NOTE — ED PROVIDER NOTES
"ED Provider Note    Scribed for Pepe Bertrand M.D. by Rafael Mcgovern. 10/8/2019, 9:02 PM.    Primary care provider: PRICE Vaca  Means of arrival: Walk-in  History obtained from: Patient  History limited by: None    CHIEF COMPLAINT  Chief Complaint   Patient presents with   • Sent from Urgent Care     r/o pneumonia   • Cough     x2days       HPI  Primitivo Milan is a 89 y.o. male who presents to the Emergency Department sent from  for concerns for pneumonia presenting with an acute, worsening productive cough onset 2 days ago. Patient was referred here after blood work showed lactate of 1.8 and DX-chest showed: \"Increase in perihilar and basilar pulmonary opacifications could be due to pulmonary edema or inflammation such as bronchitis or pneumonitis, and Small bilateral pleural fluid collections or increased compared to the prior exam\". Patient endorses associated congestion, rhinorrhea, and epistaxis. He denies any acute chest pain. Patient has an extensive medical history including Afib, hypertension, TIA, CKD, complete AV block, prostate cancer, lung cancer, and aortic stenosis. He currently takes medications including Cozaar 25 mg, Aristocort, Coumadin 5 mg, and Cardizem CD.    REVIEW OF SYSTEMS  See HPI for further details. All other systems are negative.     PAST MEDICAL HISTORY   has a past medical history of Anticoagulation monitoring, special range, Atrial fibrillation (HCC), Atrioventricular block, complete (HCC), Bell's palsy, Cancer (HCC) (2001), Essential hypertension, Extranodal lymphoma (HCC), History of TIA (transient ischemic attack) and stroke, TIA (transient ischemic attack), Kidney mass, Laceration of left ring finger (4/17/2019), Moderate aortic stenosis (10/2018), Other dyspnea and respiratory abnormality, Other malignant lymphomas, unspecified site, extranodal and solid organ sites, Paroxysmal atrial fibrillation (HCC), Personal history of malignant neoplasm of " "prostate, Personal history of venous thrombosis and embolism, Pneumonia due to infectious organism (12/11/2018), Presence of permanent cardiac pacemaker (09/2009), Prostate cancer ([facility]) (1991), Renal disorder (1970), Scalp laceration (4/17/2019), Sleep apnea, Stroke ([facility]) (1991), TIA (transient ischemic attack), and Trauma (4/17/2019).    SURGICAL HISTORY   has a past surgical history that includes lithotripsy (1995); prostatectomy, radical retro (1991); eye surgery (2006); recovery (5/19/2011); eye surgery; and pacemaker insertion (September 2009).    SOCIAL HISTORY  Social History     Tobacco Use   • Smoking status: Never Smoker   • Smokeless tobacco: Never Used   Substance Use Topics   • Alcohol use: No     Alcohol/week: 0.0 oz   • Drug use: No      Social History     Substance and Sexual Activity   Drug Use No       FAMILY HISTORY  Family History   Problem Relation Age of Onset   • Heart Disease Mother    • Heart Attack Mother    • Heart Disease Father    • Heart Attack Father    • No Known Problems Maternal Grandmother    • No Known Problems Maternal Grandfather    • No Known Problems Paternal Grandmother    • No Known Problems Paternal Grandfather        CURRENT MEDICATIONS  Reviewed.  See Encounter Summary.     ALLERGIES  Allergies   Allergen Reactions   • Iodine        PHYSICAL EXAM  VITAL SIGNS: /67   Pulse 60   Temp 36.8 °C (98.2 °F) (Temporal)   Resp 18   Ht 1.727 m (5' 8\")   Wt 77.2 kg (170 lb 3.1 oz)   SpO2 91%   BMI 25.88 kg/m²   Constitutional: Alert in no apparent distress.  HENT: No signs of trauma, Bilateral external ears normal, Nose normal.   Eyes: Pupils are equal and reactive, Conjunctiva normal, Non-icteric.   Neck: Normal range of motion, No tenderness, Supple, No stridor.   Cardiovascular: Regular rate and rhythm,  grade 3/6 systolic murmur.  Thorax & Lungs: Inspiratory and expiratory wheezes throughout, increased work of breathing but speaking in full sentences, No chest " tenderness.   Abdomen: Bowel sounds normal, Soft, No tenderness, No masses, No pulsatile masses. No peritoneal signs.  Skin: Warm, Dry, No erythema, No rash.   Back: No bony tenderness, No CVA tenderness.   Extremities: Intact distal pulses, No edema, No tenderness, No cyanosis  Musculoskeletal: Good range of motion in all major joints. No tenderness to palpation or major deformities noted.   Neurologic: Alert , Normal motor function, Normal sensory function, No focal deficits noted.   Psychiatric: Affect normal, Judgment normal, Mood normal.     DIAGNOSTIC STUDIES / PROCEDURES     LABS  Results for orders placed or performed during the hospital encounter of 10/08/19   Lactic acid (lactate)   Result Value Ref Range    Lactic Acid 1.8 0.5 - 2.0 mmol/L   CBC WITH DIFFERENTIAL   Result Value Ref Range    WBC 6.8 4.8 - 10.8 K/uL    RBC 4.56 (L) 4.70 - 6.10 M/uL    Hemoglobin 14.8 14.0 - 18.0 g/dL    Hematocrit 45.3 42.0 - 52.0 %    MCV 99.3 (H) 81.4 - 97.8 fL    MCH 32.5 27.0 - 33.0 pg    MCHC 32.7 (L) 33.7 - 35.3 g/dL    RDW 59.1 (H) 35.9 - 50.0 fL    Platelet Count 175 164 - 446 K/uL    MPV 10.0 9.0 - 12.9 fL    Neutrophils-Polys 73.50 (H) 44.00 - 72.00 %    Lymphocytes 12.30 (L) 22.00 - 41.00 %    Monocytes 11.50 0.00 - 13.40 %    Eosinophils 1.80 0.00 - 6.90 %    Basophils 0.60 0.00 - 1.80 %    Immature Granulocytes 0.30 0.00 - 0.90 %    Nucleated RBC 0.00 /100 WBC    Neutrophils (Absolute) 5.01 1.82 - 7.42 K/uL    Lymphs (Absolute) 0.84 (L) 1.00 - 4.80 K/uL    Monos (Absolute) 0.78 0.00 - 0.85 K/uL    Eos (Absolute) 0.12 0.00 - 0.51 K/uL    Baso (Absolute) 0.04 0.00 - 0.12 K/uL    Immature Granulocytes (abs) 0.02 0.00 - 0.11 K/uL    NRBC (Absolute) 0.00 K/uL   COMP METABOLIC PANEL   Result Value Ref Range    Sodium 142 135 - 145 mmol/L    Potassium 4.5 3.6 - 5.5 mmol/L    Chloride 112 96 - 112 mmol/L    Co2 23 20 - 33 mmol/L    Anion Gap 7.0 0.0 - 11.9    Glucose 96 65 - 99 mg/dL    Bun 35 (H) 8 - 22 mg/dL     Creatinine 1.55 (H) 0.50 - 1.40 mg/dL    Calcium 9.5 8.5 - 10.5 mg/dL    AST(SGOT) 34 12 - 45 U/L    ALT(SGPT) 17 2 - 50 U/L    Alkaline Phosphatase 84 30 - 99 U/L    Total Bilirubin 1.3 0.1 - 1.5 mg/dL    Albumin 3.8 3.2 - 4.9 g/dL    Total Protein 6.8 6.0 - 8.2 g/dL    Globulin 3.0 1.9 - 3.5 g/dL    A-G Ratio 1.3 g/dL   ESTIMATED GFR   Result Value Ref Range    GFR If African American 50 (A) >60 mL/min/1.73 m 2    GFR If Non  42 (A) >60 mL/min/1.73 m 2   proBrain Natriuretic Peptide, NT   Result Value Ref Range    NT-proBNP 1668 (H) 0 - 125 pg/mL   TROPONIN   Result Value Ref Range    Troponin T 33 (H) 6 - 19 ng/L   Influenza A/B By PCR (Adult - Flu Only)   Result Value Ref Range    Influenza virus A RNA Negative Negative    Influenza virus B, PCR Negative Negative   EKG   Result Value Ref Range    Report       [facility] Emergency Dept.    Test Date:  2019-10-08  Pt Name:    SIXTO LUDWIG                Department: ER  MRN:        [id number]                      Room:        08  Gender:     Male                         Technician: [id number]  :        [id number]                   Requested By:ULISES COTA  Order #:    [id number]                    Reading MD:    Measurements  Intervals                                Axis  Rate:       60                           P:          0  AL:         190                          QRS:        -49  QRSD:       124                          T:          123  QT:         464  QTc:        464    Interpretive Statements  SINUS RHYTHM  NONSPECIFIC IVCD WITH LAD  PROBABLE INFERIOR INFARCT, OLD  ABNRM R PROG, CONSIDER ASMI OR LEAD PLACEMENT  Compared to ECG 2019 17:37:36  Intraventricular conduction delay now present  Myocardial infarct finding now present  Ventricular-paced complex(es) or rhythm no longer present       All labs were reviewed by me.    EKG  12 Lead EKG interpreted by me to show:  Sinus rhythm  Rate 60  Axis: Left axis  Intervals:  IVCD  Inferior Q waves      10/8/2019 4:28 PM    HISTORY/REASON FOR EXAM:  Cough for 2 days.      TECHNIQUE/EXAM DESCRIPTION AND NUMBER OF VIEWS:  Two views of the chest.    COMPARISON:  11/30/2018    FINDINGS:  Cardiac silhouette is borderline enlarged.  Small bilateral pleural effusions are noted. Right-sided blunting of the costophrenic angle is new.  There are increased linear and poorly defined opacifications in each lung base. Small area of focal consolidation is again noted in the left upper pulmonary lobe.     Impression       1.  Increase in perihilar and basilar pulmonary opacifications could be due to pulmonary edema or inflammation such as bronchitis or pneumonitis.    2.  Small bilateral pleural fluid collections or increased compared to the prior exam.    3.  Probable scarring in left upper lung is again identified.     10/8/2019 4:28 PM    HISTORY/REASON FOR EXAM:  Cough for 2 days.      TECHNIQUE/EXAM DESCRIPTION AND NUMBER OF VIEWS:  Two views of the chest.    COMPARISON:  11/30/2018    FINDINGS:  Cardiac silhouette is borderline enlarged.  Small bilateral pleural effusions are noted. Right-sided blunting of the costophrenic angle is new.  There are increased linear and poorly defined opacifications in each lung base. Small area of focal consolidation is again noted in the left upper pulmonary lobe.     Impression       1.  Increase in perihilar and basilar pulmonary opacifications could be due to pulmonary edema or inflammation such as bronchitis or pneumonitis.    2.  Small bilateral pleural fluid collections or increased compared to the prior exam.    3.  Probable scarring in left upper lung is again identified.         COURSE & MEDICAL DECISION MAKING  Pertinent Labs & Imaging studies reviewed. (See chart for details)    9:02 PM - Patient seen and examined at bedside. Patient will be treated with Unasyn 3 g. Ordered lactic acid, estimated GFR, CBC with diff, CMP, urinalysis, urine culture, blood  culture, and EKG to evaluate his symptoms. I informed the patient that I would like to admit him for suspected pneumonia, and he was understanding and agreeable to admission. Fluids not given due to potential of fluid overload and stenosis.     9:17 PM - I discussed the patient's case and the above findings with Dr. Tamayo (Hospitalist) who agrees to admit the patient into his care. Ordered troponin, pBNP, lactic acid, CMP, CBC with diff, influenza A/B by PCR, and blood culture.    CRITICAL CARE  The very real possibilty of a deterioration of this patient's condition required the highest level of my preparedness for sudden, emergent intervention.  I provided critical care services, which included medication orders, frequent reevaluations of the patient's condition and response to treatment, ordering and reviewing test results, and discussing the case with various consultants.  The critical care time associated with the care of the patient was 35 minutes. Review chart for interventions. This time is exclusive of any other billable procedures.     Decision Making:  This is a 89 y.o. year old male who presents with above complaint.  I do believe the patient's presentation is likely correlative's acute CHF exacerbation.  This is also likely causing his hypoxia.  Lastly would likely having some demand ischemia with slightly elevated troponin.  I do believe that the patient will need to come to the hospital for ongoing inpatient care and stabilization.  I discussed the case with hospital service was agreeable to ongoing inpatient care at this time.    DISPOSITION:  Patient will be admitted to Dr. Tamayo in guarded condition.    FINAL IMPRESSION  chf exacerbation  Hypoxia  Elevated trop     Rafael OLIVEIRA (Ruiz), am scribing for, and in the presence of, Pepe Bertrand M.D..    Electronically signed by: Rafael Mcgovern (Ruiz), 10/8/2019    Pepe OLIVEIRA M.D. personally performed the services described in this  documentation, as scribed by Rafael Mcgovern in my presence, and it is both accurate and complete.    C.    The note accurately reflects work and decisions made by me.  Pepe Bertrand  10/9/2019  2:32 AM

## 2019-10-09 NOTE — THERAPY
"Occupational Therapy Evaluation completed.   Functional Status:  Pt presents to skilled OT services following acute respiratory failure w/ hypoxia, acute on chronic diastolic CHF, bronchitis. Pt performed bed mobility with min a, STS eob with min a given HHA of 2 person, ambulated short distance to sink with min a, completed oral care standing sink side with min a for dynamic balance during task, chair t/f with min a given HHA of 2 people. Pt demonstrates decreased activity tolerance, functional mobility and generalized weakness which currently limits pt's ability to participate in ADLs. Pt reports has good support through dtrs to assist with IADLs and have been assisting with spouse caregiving since pt's d/c from hospital on 4/19 along with hospice services. Will follow while in house and anticipate w/ increased oob mobility, ADL participation and medical management will be able d/c home with family and HHS.   Plan of Care: Will benefit from Occupational Therapy 3 times per week  Discharge Recommendations:  Equipment: Will Continue to Assess for Equipment Needs. Post-acute therapy Recommend home health transitional care for continued occupational therapy services pending progress with therapy.       See \"Rehab Therapy-Acute\" Patient Summary Report for complete documentation.    "

## 2019-10-09 NOTE — ED TRIAGE NOTES
Chief Complaint   Patient presents with   • Sent from Urgent Care     r/o pneumonia   • Cough     x2days     Pt ambulated to triage , sent from Urgent care to r/o pneumonia , he had chest xray today.   Cough x2days , productive.

## 2019-10-09 NOTE — PROGRESS NOTES
Inpatient Anticoagulation Service Note    Date: 10/9/2019    Reason for Anticoagulation: Atrial Fibrillation   Target INR: 2.0 to 3.0  [id number] VASc Score: 6  HAS-BLED Score: 2   Hemoglobin Value: 14.8  Hematocrit Value: 44.6    INR from last 7 days     None        Dose from last 7 days     Date/Time Dose (mg)    10/09/19 0357  0        Average Dose (mg): (2.5 mg (5 mg x 0.5) every Tue, Sat; 5 mg (5 mg x 1) all other days)  Significant Interactions: Antibiotics, Corticosteroids  Bridge Therapy: No     Reversal Agent Administered: Not Applicable  Comments: Resuming home warfarin for atrial fibrillation. Admit for PNA r/o.  INR supratherapeutic at coumadin clinic visit on 10/8 afternoon.  New DDI with antibiotics.  H/H stable with no s/sx of bleeding.  Will hold dose tonight and recommend rechecking INR on 10/10 AM.    Plan:  Hold warfarin. INR in AM.  Education Material Provided?: No(chronic warfarin patient)  Pharmacist suggested discharge dosing: Resume home regimen of 2.5 mg (5 mg x 0.5) every Tue, Sat; 5 mg (5 mg x 1) all other days     Reno Marrero

## 2019-10-09 NOTE — PROGRESS NOTES
Report received from Kristian RN, patient care assumed. Patient is awake, alert and oriented x4. On 4LO2, visible chest rise/fall. Denies pain, no s/s of distress. Tele monitor on. Bed low, locked position. Call light in reach.     Initial assessment completed. Updated on plan of care. No questions at this time.

## 2019-10-09 NOTE — PROGRESS NOTES
Admission 2 RN skin check.  Elbows reddened and blanchable.   Sacrum reddened and blanchable.  Skin protective measures in place.

## 2019-10-09 NOTE — CARE PLAN
Problem: Safety  Goal: Will remain free from injury  Outcome: PROGRESSING AS EXPECTED  Note:   Patient free from falls and injuries at this time. Bed low locked position. Bed alarm on. Call light in reach. Patient is A&Ox4, uses call light appropriately to call for assistance. OOB with standby assistance. Treaded footwear applied. Personal belongings in reach. Family at bedside.        Problem: Knowledge Deficit  Goal: Knowledge of disease process/condition, treatment plan, diagnostic tests, and medications will improve  Outcome: PROGRESSING AS EXPECTED  Note:   Patient updated on plan of care. All medications discussed with patient prior to administration. Patient verbalizes understanding, no questions at this time.

## 2019-10-10 PROBLEM — J20.8 ACUTE BRONCHITIS DUE TO OTHER SPECIFIED ORGANISMS: Status: ACTIVE | Noted: 2019-01-01

## 2019-10-10 PROBLEM — N18.30 CKD (CHRONIC KIDNEY DISEASE) STAGE 3, GFR 30-59 ML/MIN: Status: ACTIVE | Noted: 2019-01-01

## 2019-10-10 NOTE — PROGRESS NOTES
Cardiovascular Nurse Navigator () Advanced Heart Failure Program Inpatient Progress Note:     Chief Complaint: SOB x 2 days. Patient has been following in clinic with Dr. ELLEN Marshall for years now. He has a PPM and in January of this year, his AS was noted to have progressed to moderate, Dr. Marshall has never diagnosed heart failure (HF).    No echo since 2018 admitting hospitalist has diagnosed HF, repeat echo pending.    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    If any education is provided to patient and family, please specify that heart failure is a working diagnosis until MD has confirmed diagnosis and has discussed it with the patient.     If after echocardiogram results are available, heart failure is ruled out, respectfully request that:    1. Attending provider clearly state in note that HF is ruled out. If this is not done, patient will still code for heart failure.    2. Remove HF from the problems list so that staff are not confused about necessary interventions and patient does not receive education on a diagnosis he does not have.    If echocardiogram results do not eliminate HF as a diagnosis, please consult cardiology as is expected for all new HF diagnoses.     Thank you and please call with questions, [place]

## 2019-10-10 NOTE — DISCHARGE PLANNING
Care Transition Team Assessment  LSW met with pt at bedside to complete assessment and discuss discharge plans/barriers. Pt reported goal of returning home.     Pt is 89 year old  male, who lives with his wife of 73 years in their home in [place]. 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 He discussed strong family support from his children and grandchildren. He reported he is independent with his ADLs and some iADL needs but explained his family assist frequently. He reported he is able to cook light meals like eggs or warming something up in the microwave but reported his daughters will cook there or bring food over and help with all the house cleaning, grocery shopping and driving. He denied any DME used in the home for himself but reported they have used oxygen in the past. He could not currently recall the company he obtained oxygen from in the past.     Pt denied any financial difficulties or inability to met his basic needs. 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 Pt is also a [employer] . Pt denied any substance abuse or behavioral health dxs. No current social needs/barriers identified at this time.     Information Source  Orientation : Oriented x 4  Information Given By: Patient  Informant's Name: Primitivo  Who is responsible for making decisions for patient? : Patient    Readmission Evaluation  Is this a readmission?: No    Elopement Risk  Legal Hold: No  Ambulatory or Self Mobile in Wheelchair: No-Not an Elopement Risk    Interdisciplinary Discharge Planning  Does Admitting Nurse Feel This Could be a Complex Discharge?: No  Primary Care Physician: VIOLETTA Julio   Lives with - Patient's Self Care Capacity: Spouse  Patient or legal guardian wants to designate a caregiver (see row info): No  Support Systems: Family Member(s)  Housing / Facility: 1 Story House  Do You Take your Prescribed  Medications Regularly: Yes  Prior Services: None  Assistance Needed: No  Durable Medical Equipment: Not Applicable    Discharge Preparedness  What is your plan after discharge?: Home with help  Prior Functional Level: Ambulatory, Independent with Activities of Daily Living, Independent with Medication Management  Difficulity with ADLs: None  Difficulity with IADLs: Cooking, Driving, Laundry, Shopping  Difficulity with IADL Comments: Daughters provide assistance     Functional Assesment  Prior Functional Level: Ambulatory, Independent with Activities of Daily Living, Independent with Medication Management    Finances  Financial Barriers to Discharge: No  Prescription Coverage: Yes    Vision / Hearing Impairment  Vision Impairment : Yes  Left Eye Vision: (double vision )  Hearing Impairment : Yes  Hearing Impairment: Both Ears, Hearing Device(s) Available  Does Pt Need Special Equipment for the Hearing Impaired?: No    Advance Directive  Advance Directive?: DPOA for Health Care  Durable Power of  Name and Contact : Primary: Madhavi; 1st: Hollie Milan and Griselda Araya    Domestic Abuse  Have you ever been the victim of abuse or violence?: No  Physical Abuse or Sexual Abuse: No  Verbal Abuse or Emotional Abuse: No  Possible Abuse Reported to:: Not Applicable    Psychological Assessment  History of Substance Abuse: None  History of Psychiatric Problems: No    Discharge Risks or Barriers  Discharge risks or barriers?: No    Anticipated Discharge Information  Anticipated discharge disposition: Home  Discharge Address: [address] [place] [id number]  Discharge Contact Phone Number: [phone]

## 2019-10-10 NOTE — RESPIRATORY CARE
COPD EDUCATION by COPD CLINICAL EDUCATOR  10/10/2019 at 6:29 AM by Edyta Petersen     Patient reviewed by COPD education team. Patient does not have a history or diagnosis of COPD and is a non-smoker, therefore does not qualify for the COPD program.

## 2019-10-10 NOTE — PROGRESS NOTES
Inpatient Anticoagulation Service Note    Date: 10/10/2019    Reason for Anticoagulation: Atrial Fibrillation   Target INR: 2.0 to 3.0  [id number] VASc Score: 6  HAS-BLED Score: 2   Hemoglobin Value: (!) 13.3  Hematocrit Value: (!) 41.6    INR from last 7 days     Date/Time INR Value    10/10/19 0209  (!) 3.54        Dose from last 7 days     Date/Time Dose (mg)    10/10/19 0957  0    10/09/19 0357  0        Average Dose (mg): (2.5 mg (5 mg x 0.5) every Tue, Sat; 5 mg (5 mg x 1) all other days)  Significant Interactions: Antibiotics, Corticosteroids  Bridge Therapy: No     Reversal Agent Administered: Not Applicable  Comments: INR remains supratherapeutic, trending up. Will hold one more dose. Resume warfarin tomorrow to prevent subtherapeutic INR. Continue home dose at that time. Daily INR.     Plan:  Hold warfarin  Education Material Provided?: No(chronic warfarin patient)  Pharmacist suggested discharge dosing: Resume home regimen of 2.5 mg (5 mg x 0.5) every Tue, Sat; 5 mg (5 mg x 1) all other days. INR within 5 days of discharge.      Liam Espinosa, PharmD

## 2019-10-10 NOTE — PROGRESS NOTES
[facility] Medicine Daily Progress Note    Date of Service  10/9/2019    Chief Complaint  89 y.o. male admitted 10/8/2019 with increasing shortness of breath      Interval Problem Update  Patient is in bed, continue requiring oxygen, still having wheezing, and shortness of breath, bilateral lower extremity edema, denies any fever or chills but he has productive cough, no hemoptysis, no lymphadenopathy, denies any nausea vomiting diarrhea constipation.    Consultants/Specialty  None    Code Status  DNR/DNI    Disposition  Home when stable    Review of Systems  Review of Systems   Constitutional: Negative for chills and fever.   HENT: Negative for congestion and sinus pain.    Eyes: Negative for blurred vision and double vision.   Respiratory: Positive for cough, sputum production, shortness of breath and wheezing.    Cardiovascular: Positive for leg swelling. Negative for chest pain, palpitations and orthopnea.   Gastrointestinal: Negative for heartburn, nausea and vomiting.   Genitourinary: Negative for dysuria and urgency.   Musculoskeletal: Negative for myalgias and neck pain.   Skin: Negative for rash.   Neurological: Negative for dizziness, tingling and headaches.   Endo/Heme/Allergies: Does not bruise/bleed easily.   Psychiatric/Behavioral: Negative for depression and suicidal ideas.        Physical Exam  Temp:  [36.4 °C (97.6 °F)-36.8 °C (98.3 °F)] 36.4 °C (97.6 °F)  Pulse:  [59-85] 66  Resp:  [16-20] 18  BP: (105-162)/(51-86) 105/51  SpO2:  [91 %-95 %] 94 %    Physical Exam   Constitutional: He is oriented to person, place, and time. He appears well-nourished. No distress.   HENT:   Head: Normocephalic and atraumatic.   Mouth/Throat: No oropharyngeal exudate.   Eyes: Conjunctivae are normal. Right eye exhibits no discharge. Left eye exhibits no discharge. No scleral icterus.   Neck: Normal range of motion. Neck supple. No JVD present.   Cardiovascular: Normal rate. Exam reveals no gallop and no friction rub.    Pulmonary/Chest: Effort normal. No stridor. He has wheezes. He has rales.   Abdominal: Soft. Bowel sounds are normal. He exhibits no distension. There is no tenderness. There is no rebound.   Musculoskeletal: Normal range of motion. He exhibits edema (Lower extremities).   Lymphadenopathy:     He has no cervical adenopathy.   Neurological: He is alert and oriented to person, place, and time. He exhibits normal muscle tone.   Skin: Skin is warm. No erythema.   Psychiatric: He has a normal mood and affect.   Nursing note and vitals reviewed.      Fluids    Intake/Output Summary (Last 24 hours) at 10/9/2019 1733  Last data filed at 10/9/2019 0200  Gross per 24 hour   Intake 200 ml   Output 200 ml   Net 0 ml       Laboratory  Recent Labs     10/08/19  1823 10/09/19  0323   WBC 6.8 6.3   RBC 4.56* 4.51*   HEMOGLOBIN 14.8 14.8   HEMATOCRIT 45.3 44.6   MCV 99.3* 98.9*   MCH 32.5 32.8   MCHC 32.7* 33.2*   RDW 59.1* 58.2*   PLATELETCT 175 165   MPV 10.0 10.3     Recent Labs     10/08/19  1823 10/09/19  0323   SODIUM 142 143   POTASSIUM 4.5 4.6   CHLORIDE 112 113*   CO2 23 20   GLUCOSE 96 112*   BUN 35* 33*   CREATININE 1.55* 1.37   CALCIUM 9.5 9.0     Recent Labs     10/08/19  1541   INR 3.50               Imaging  No orders to display        Assessment/Plan  Acute on chronic diastolic congestive heart failure (HCC)- (present on admission)  Assessment & Plan  Continue IV diuresis, strict I's and O's, echo pending.    Acute respiratory failure with hypoxia (HCC)- (present on admission)  Assessment & Plan  Secondary to diastolic CHF exacerbation and bronchitis  Continue respiratory care per protocol  Continue oxygen per protocol,  IV diuresis, close monitoring his I's and O's    Bronchitis, acute, with bronchospasm- (present on admission)  Assessment & Plan  Likely acute bronchitis, procalcitonin is elevated, continue antibiotics, continue oral steroids breathing treatments supportive treatment.      Paroxysmal atrial  fibrillation (HCC)- (present on admission)  Assessment & Plan  Continue Cardizem and Coumadin, stable    Essential hypertension- (present on admission)  Assessment & Plan  Continue Cardizem and losartan.  Continue monitoring    Moderate aortic stenosis- (present on admission)  Assessment & Plan  Monitor volume and respiratory status closely  Echo pending    History of TIA (transient ischemic attack) and stroke- (present on admission)  Assessment & Plan  Continue Coumadin.  No neurological deficit    Elevated troponin- (present on admission)  Assessment & Plan  Type II NSTEMI in the setting of acute respiratory failure with hypoxia  No chest pain         VTE prophylaxis: Coumadin

## 2019-10-10 NOTE — CARE PLAN
Problem: Safety  Goal: Will remain free from injury  Outcome: PROGRESSING AS EXPECTED  Note:   Patient free from falls and injuries at this time. Bed low locked position. Call light in reach. Patient is A&Ox4, Teller. Uses call light appropriately to call for assistance. OOB with standby assistance. Bed and chair alarm on. Treaded footwear applied. Personal belongings in reach.        Problem: Knowledge Deficit  Goal: Knowledge of disease process/condition, treatment plan, diagnostic tests, and medications will improve  Outcome: PROGRESSING AS EXPECTED  Note:   Patient updated on plan of care. All medications discussed with patient prior to administration. Patient verbalizes understanding, no questions at this time.

## 2019-10-10 NOTE — CARE PLAN
Problem: Oxygenation:  Goal: Maintain adequate oxygenation dependent on patient condition  Intervention: Levels of oxygenation will improve to baseline  Note:   2L NC     Problem: Bronchoconstriction:  Goal: Improve in air movement and diminished wheezing  Intervention: Implement inhaled treatments  Note:   Duo Q4

## 2019-10-11 NOTE — PROGRESS NOTES
[facility] Medicine Daily Progress Note    Date of Service  10/11/2019    Chief Complaint  89 y.o. male admitted 10/8/2019 with sob         Interval Problem Update  Breathing is better but sob with exertion    Actively wheezing    Afebrile    bnp elevated at 3558    Cr worse at 2.32- his baseline cr is not known but he does has CKD stage 3    Consultants/Specialty  none  Code Status  dnr    Disposition  home    Review of Systems  Review of Systems   Constitutional: Positive for malaise/fatigue.   HENT: Negative.    Eyes: Negative.    Respiratory: Negative.    Cardiovascular: Negative.    Genitourinary: Negative.    Musculoskeletal: Negative.    Neurological: Negative.    Psychiatric/Behavioral: Negative.    All other systems reviewed and are negative.       Physical Exam  Temp:  [36.4 °C (97.5 °F)-36.8 °C (98.3 °F)] 36.5 °C (97.7 °F)  Pulse:  [60-75] 75  Resp:  [20-21] 20  BP: (109-168)/(62-83) 109/62  SpO2:  [89 %-95 %] 89 %    Physical Exam   Constitutional: He is oriented to person, place, and time. No distress.   HENT:   Mouth/Throat: No oropharyngeal exudate.   Eyes: Left eye exhibits no discharge.   Neck: No JVD present. No tracheal deviation present. No thyromegaly present.   Cardiovascular: Normal rate and intact distal pulses. Exam reveals no friction rub.   Murmur heard.  Pulmonary/Chest: He has wheezes.   Abdominal: Soft. Bowel sounds are normal. He exhibits no distension.   Musculoskeletal: Normal range of motion. He exhibits edema (+1 edema both LE). He exhibits no deformity.   Neurological: He is alert and oriented to person, place, and time. No cranial nerve deficit.   Skin: No rash noted. He is not diaphoretic. No erythema.   Psychiatric: He has a normal mood and affect.       Fluids    Intake/Output Summary (Last 24 hours) at 10/11/2019 1401  Last data filed at 10/11/2019 0900  Gross per 24 hour   Intake --   Output 2100 ml   Net -2100 ml       Laboratory  Recent Labs     10/08/19  1823 10/09/19  2980  10/10/19  0209   WBC 6.8 6.3 11.1*   RBC 4.56* 4.51* 4.21*   HEMOGLOBIN 14.8 14.8 13.3*   HEMATOCRIT 45.3 44.6 41.6*   MCV 99.3* 98.9* 98.8*   MCH 32.5 32.8 31.6   MCHC 32.7* 33.2* 32.0*   RDW 59.1* 58.2* 57.6*   PLATELETCT 175 165 172   MPV 10.0 10.3 10.6     Recent Labs     10/09/19  0323 10/10/19  0209 10/11/19  0225   SODIUM 143 141 143   POTASSIUM 4.6 4.4 4.1   CHLORIDE 113* 108 105   CO2 20 20 25   GLUCOSE 112* 104* 83   BUN 33* 50* 61*   CREATININE 1.37 2.22* 2.32*   CALCIUM 9.0 9.0 9.5     Recent Labs     10/08/19  1541 10/10/19  0209 10/11/19  0225   INR 3.50 3.54* 2.27*               Imaging  No orders to display        Assessment/Plan  Acute on chronic diastolic congestive heart failure (HCC)- (present on admission)  Assessment & Plan  Continue IV diuresis, strict I's and O's, echo pending.    Iv diuresis cut down to 20mg iv q12 on 10/11    Acute respiratory failure with hypoxia (HCC)- (present on admission)  Assessment & Plan  Secondary to diastolic CHF exacerbation and bronchitis  Continue respiratory care per protocol  Continue oxygen per protocol,  IV diuresis, close monitoring his I's and O's    Bronchitis, acute, with bronchospasm- (present on admission)  Assessment & Plan  Likely acute bronchitis, procalcitonin is elevated, continue antibiotics, continue oral steroids breathing treatments supportive treatment.      Paroxysmal atrial fibrillation (HCC)- (present on admission)  Assessment & Plan  Continue Cardizem and Coumadin, stable    Essential hypertension- (present on admission)  Assessment & Plan  Continue Cardizem and losartan.  Continue monitoring    Moderate aortic stenosis- (present on admission)  Assessment & Plan  Monitor volume and respiratory status closely  Echo pending    History of TIA (transient ischemic attack) and stroke- (present on admission)  Assessment & Plan  Continue Coumadin.  No neurological deficit    Acute bronchitis due to other specified organisms- (present on  admission)  Assessment & Plan  Po zithromax x 3 days    CKD (chronic kidney disease) stage 3, GFR 30-59 ml/min ([facility])- (present on admission)  Assessment & Plan  Monitor bmp    Renal dose all medications    Elevated troponin- (present on admission)  Assessment & Plan  Type II NSTEMI in the setting of acute respiratory failure with hypoxia  No chest pain   check am bmp, bnp    VTE prophylaxis: scd

## 2019-10-11 NOTE — PROGRESS NOTES
"Care of patient assumed by this RN at approximately 0645. Patient reported having \"really bad dream\" last night where \"bad stuff happened to the whole family.\" Patient redirected by this RN and POC explained. This RN will continue to monitor.    0800 Assessment noted expirational wheezes. Patient refused breathing treatment at this time.     1130 Patient attempted to get out of chair independently. Patient stating \"I need to put my pants and shoes on!\" Patient gently redirected by this RN and patient was settled back into bed. This RN will continue to monitor.  "

## 2019-10-11 NOTE — PROGRESS NOTES
Inpatient Anticoagulation Service Note    Date: 10/11/2019    Reason for Anticoagulation: Atrial Fibrillation   Target INR: 2.0 to 3.0  [id number] VASc Score: 6  HAS-BLED Score: 2   Hemoglobin Value: (!) 13.3  Hematocrit Value: (!) 41.6    INR from last 7 days     Date/Time INR Value    10/11/19 0225  (!) 2.27    10/10/19 0209  (!) 3.54        Dose from last 7 days     Date/Time Dose (mg)    10/11/19 1400  3    10/10/19 0957  0    10/09/19 0357  0          Average Dose (mg): (2.5 mg (5 mg x 0.5) every Tue, Sat; 5 mg (5 mg x 1) all other days)  Significant Interactions: Antibiotics, Corticosteroids  Bridge Therapy: No     Reversal Agent Administered: Not Applicable    Comments:   89 y.o. male admitted 10/8/2019 with sob. PMH significant for AFib, HTN, Hx of TIA, Hx DVT/PE, cardiac pacemaker, prostate cancer. Warfarin for AFIB , on Cardizem. INR supra-therapeutic on admit. DDI noted. Last H/H low , decreased over interval. No documented signs of overt bleeding. INR currently @ goal , warfarin held 2/2 supratherapeutic INR for last 2 days while inpatient.      Plan:  Warfarin 3 mg po tonight , INR in AM   Education Material Provided?: No(chronic warfarin patient)  Pharmacist suggested discharge dosing: Warfarin 3-4 mg po daily , follow up with anticoagulation clinic after discharge.      David Jimenez PharmD BCPS

## 2019-10-11 NOTE — PROGRESS NOTES
[facility] Medicine Daily Progress Note    Date of Service  10/10/2019    Chief Complaint  89 y.o. male admitted 10/8/2019 with sob         Interval Problem Update  Breathing is better    Afebrile    No chest pain    Wheezing    bnp worse at 3904    Cr worse at2.22    Consultants/Specialty  none  Code Status  dnr    Disposition  home    Review of Systems  Review of Systems   Constitutional: Positive for malaise/fatigue.   HENT: Negative.    Eyes: Negative.    Respiratory: Negative.    Cardiovascular: Negative.    Genitourinary: Negative.    Musculoskeletal: Negative.    Neurological: Negative.    Psychiatric/Behavioral: Negative.    All other systems reviewed and are negative.       Physical Exam  Temp:  [36.3 °C (97.4 °F)-37 °C (98.6 °F)] 36.6 °C (97.8 °F)  Pulse:  [59-89] 60  Resp:  [19-20] 20  BP: (104-168)/(55-71) 168/71  SpO2:  [90 %-95 %] 92 %    Physical Exam   Constitutional: He is oriented to person, place, and time. No distress.   HENT:   Mouth/Throat: No oropharyngeal exudate.   Eyes: Left eye exhibits no discharge.   Neck: No JVD present. No tracheal deviation present. No thyromegaly present.   Cardiovascular: Normal rate and intact distal pulses. Exam reveals no friction rub.   Murmur heard.  Pulmonary/Chest: He has wheezes.   Abdominal: Soft. Bowel sounds are normal. He exhibits no distension.   Musculoskeletal: Normal range of motion. He exhibits no edema or deformity.   Neurological: He is alert and oriented to person, place, and time. No cranial nerve deficit.   Skin: No rash noted. He is not diaphoretic. No erythema.   Psychiatric: He has a normal mood and affect.       Fluids  No intake or output data in the 24 hours ending 10/10/19 1735    Laboratory  Recent Labs     10/08/19  1823 10/09/19  0323 10/10/19  0209   WBC 6.8 6.3 11.1*   RBC 4.56* 4.51* 4.21*   HEMOGLOBIN 14.8 14.8 13.3*   HEMATOCRIT 45.3 44.6 41.6*   MCV 99.3* 98.9* 98.8*   MCH 32.5 32.8 31.6   MCHC 32.7* 33.2* 32.0*   RDW 59.1* 58.2*  57.6*   PLATELETCT 175 165 172   MPV 10.0 10.3 10.6     Recent Labs     10/08/19  1823 10/09/19  0323 10/10/19  0209   SODIUM 142 143 141   POTASSIUM 4.5 4.6 4.4   CHLORIDE 112 113* 108   CO2 23 20 20   GLUCOSE 96 112* 104*   BUN 35* 33* 50*   CREATININE 1.55* 1.37 2.22*   CALCIUM 9.5 9.0 9.0     Recent Labs     10/08/19  1541 10/10/19  0209   INR 3.50 3.54*               Imaging  No orders to display        Assessment/Plan  Acute on chronic diastolic congestive heart failure (HCC)- (present on admission)  Assessment & Plan  Continue IV diuresis, strict I's and O's, echo pending.    Acute respiratory failure with hypoxia (HCC)- (present on admission)  Assessment & Plan  Secondary to diastolic CHF exacerbation and bronchitis  Continue respiratory care per protocol  Continue oxygen per protocol,  IV diuresis, close monitoring his I's and O's    Bronchitis, acute, with bronchospasm- (present on admission)  Assessment & Plan  Likely acute bronchitis, procalcitonin is elevated, continue antibiotics, continue oral steroids breathing treatments supportive treatment.      Paroxysmal atrial fibrillation (HCC)- (present on admission)  Assessment & Plan  Continue Cardizem and Coumadin, stable    Essential hypertension- (present on admission)  Assessment & Plan  Continue Cardizem and losartan.  Continue monitoring    Moderate aortic stenosis- (present on admission)  Assessment & Plan  Monitor volume and respiratory status closely  Echo pending    History of TIA (transient ischemic attack) and stroke- (present on admission)  Assessment & Plan  Continue Coumadin.  No neurological deficit    CKD (chronic kidney disease) stage 3, GFR 30-59 ml/min ([facility])- (present on admission)  Assessment & Plan  Monitor bmp    Renal dose all medications    Elevated troponin- (present on admission)  Assessment & Plan  Type II NSTEMI in the setting of acute respiratory failure with hypoxia  No chest pain   check am bmp, bnp    VTE prophylaxis:  scd

## 2019-10-11 NOTE — PROGRESS NOTES
Telemetry Shift Summary         Rhythm A-Fib  HR Range 60-65  Ectopy rPVC  Measurements N/A/0.10/N/A

## 2019-10-11 NOTE — ASSESSMENT & PLAN NOTE
Lasix changed to po on 10/12    Hold cozaar due to rita    Decrease cardizem due to low normal bp     Monitor bmp    Renal dose all medications

## 2019-10-11 NOTE — PROGRESS NOTES
Report received from Kristian RN, patient care assumed. Patient is awake, alert and oriented x4. 2LO2, visible chest rise/fall. Denies SOB, but frequent coughing, no s/s of distress. Tele monitor on. Bed low, locked position. Call light in reach.     Initial assessment completed. Updated on plan of care. No questions at this time.

## 2019-10-11 NOTE — CARE PLAN
Problem: Communication  Goal: The ability to communicate needs accurately and effectively will improve  Outcome: PROGRESSING AS EXPECTED     Problem: Safety  Goal: Will remain free from injury  Outcome: PROGRESSING AS EXPECTED     Problem: Infection  Goal: Will remain free from infection  Outcome: PROGRESSING AS EXPECTED     Problem: Respiratory:  Goal: Respiratory status will improve  Outcome: PROGRESSING AS EXPECTED     Problem: Skin Integrity  Goal: Risk for impaired skin integrity will decrease  Outcome: PROGRESSING AS EXPECTED     Problem: Knowledge Deficit  Goal: Knowledge of disease process/condition, treatment plan, diagnostic tests, and medications will improve  Outcome: PROGRESSING AS EXPECTED

## 2019-10-11 NOTE — THERAPY
"Physical Therapy Treatment completed.   Bed Mobility:  Supine to Sit: (NT, in chair upon entry and exit)  Transfers: Sit to Stand: Supervised  Gait: Level Of Assist: Minimal Assist (for safety, progressed to supervision during session) with No Equipment Needed       Plan of Care: Will benefit from Physical Therapy 4 times per week  Discharge Recommendations: Equipment: Will Continue to Assess for Equipment Needs. Post-acute therapy: see below.    See \"Rehab Therapy-Acute\" Patient Summary Report for complete documentation.     Patient continuing to progress with functional mobility; he was able to tolerate increased ambulation distances and with improved dynamic balance. He was initially somewhat unsteady in standing but with good awareness and improved during session. Continue to recommend home with home health PT; will follow while in house and recommend mobilization with ancillary staff as able.  "

## 2019-10-11 NOTE — THERAPY
"Occupational Therapy Treatment completed with focus on ADLs, ADL transfers and patient education.  Plan of Care: Will benefit from Occupational Therapy 3 times per week  Discharge Recommendations:  Equipment Will Continue to Assess for Equipment Needs. Grab bars in shower and toilet if not already installed. Post-acute therapy Recommend home health transitional care for continued occupational therapy services.     Patient seen for OT treat focused on bathroom ADLs and household distance mobility. Patient required Min A bathing sinkside, encouraged to sit on toilet rather than stand to increase safety and accuracy, and Min A/SBA HHA/GB with mobility. Patient would benefit from skilled OT in this setting followed by [facility], wife on hospice, and professional services.     See \"Rehab Therapy-Acute\" Patient Summary Report for complete documentation.   "

## 2019-10-11 NOTE — CARE PLAN
Non-skid socks in use. Call light in reach. Pt instructed to call for help. Hourly rounding complete. Bed locked and in low position. Pt verbalized understanding of safety care plan.

## 2019-10-11 NOTE — THERAPY
"Physical Therapy Treatment completed.   Bed Mobility:  Supine to Sit: (NT, in chair upon entry and exit)  Transfers: Sit to Stand: Minimal Assist (for safety, no physical assist required)  Gait: Level Of Assist: Minimal Assist (for safety and 1x LOB) with No Equipment Needed       Plan of Care: Will benefit from Physical Therapy 4 times per week  Discharge Recommendations: Equipment: No Equipment Needed. Post-acute therapy: see below.    See \"Rehab Therapy-Acute\" Patient Summary Report for complete documentation.     Patient progressing with functional mobility. He ambulated approximately 250ft x2 without AD with min A for safety only. He had 1x LOB requiring physical assist with challenge to gait but appears to be near baseline functional mobility. 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 Will continue to follow, recommend mobilization with ancillary staff as able.  "

## 2019-10-12 PROBLEM — I50.33 ACUTE ON CHRONIC DIASTOLIC CONGESTIVE HEART FAILURE (HCC): Status: RESOLVED | Noted: 2019-01-01 | Resolved: 2019-01-01

## 2019-10-12 PROBLEM — J96.01 ACUTE RESPIRATORY FAILURE WITH HYPOXIA (HCC): Status: RESOLVED | Noted: 2019-01-01 | Resolved: 2019-01-01

## 2019-10-12 PROBLEM — J20.9 BRONCHITIS, ACUTE, WITH BRONCHOSPASM: Status: RESOLVED | Noted: 2019-01-01 | Resolved: 2019-01-01

## 2019-10-12 PROBLEM — N17.9 ACUTE RENAL FAILURE SUPERIMPOSED ON STAGE 3 CHRONIC KIDNEY DISEASE (HCC): Status: ACTIVE | Noted: 2019-01-01

## 2019-10-12 PROBLEM — J20.8 ACUTE BRONCHITIS DUE TO OTHER SPECIFIED ORGANISMS: Status: RESOLVED | Noted: 2019-01-01 | Resolved: 2019-01-01

## 2019-10-12 PROBLEM — R79.89 ELEVATED TROPONIN: Status: RESOLVED | Noted: 2019-01-01 | Resolved: 2019-01-01

## 2019-10-12 NOTE — DISCHARGE PLANNING
SW received call back from [facility], reports no documentation had been sent. SW faxed [place] at [phone]: Facesheet, H&P, Labs, and Progress Reports (last 24 hrs).   On call worker reported she would follow up with her weekend RN person and would call me with a time that she would come by and visit family.     Awaiting Response.

## 2019-10-12 NOTE — PROGRESS NOTES
Patient discharged home with daughters. Patient has referral for [facility] tomorrow. Changes to patients med rec gone over with daughter and what medications patient will need tonight. Patient belongings gathered and sent with family. Patient wheeled down by myself and assisted to car.

## 2019-10-12 NOTE — DISCHARGE INSTRUCTIONS
Discharge Instructions    Discharged to home by car with relative. Discharged via wheelchair, hospital escort: Yes.  Special equipment needed: Not Applicable    Be sure to schedule a follow-up appointment with your primary care doctor or any specialists as instructed.     Discharge Plan:   Diet Plan: Discussed  Activity Level: Discussed  Confirmed Follow up Appointment: No (Comments)  Confirmed Symptoms Management: Discussed  Medication Reconciliation Updated: Yes  Influenza Vaccine Indication: Patient Refuses    I understand that a diet low in cholesterol, fat, and sodium is recommended for good health. Unless I have been given specific instructions below for another diet, I accept this instruction as my diet prescription.   Other diet: Cardiac     Special Instructions: None    · Is patient discharged on Warfarin / Coumadin?   Yes    You are receiving the drug warfarin. Please understand the importance of monitoring warfarin with scheduled PT/INR blood draws.  Follow-up with a call to your personal Doctor's office in 3 days to schedule a PT/INR. .    IMPORTANT: HOW TO USE THIS INFORMATION:  This is a summary and does NOT have all possible information about this product. This information does not assure that this product is safe, effective, or appropriate for you. This information is not individual medical advice and does not substitute for the advice of your health care professional. Always ask your health care professional for complete information about this product and your specific health needs.      WARFARIN - ORAL (WARF-uh-rin)      COMMON BRAND NAME(S): Coumadin      WARNING:  Warfarin can cause very serious (possibly fatal) bleeding. This is more likely to occur when you first start taking this medication or if you take too much warfarin. To decrease your risk for bleeding, your doctor or other health care provider will monitor you closely and check your lab results (INR test) to make sure you are not taking  "too much warfarin. Keep all medical and laboratory appointments. Tell your doctor right away if you notice any signs of serious bleeding. See also Side Effects section.      USES:  This medication is used to treat blood clots (such as in deep vein thrombosis-DVT or pulmonary embolus-PE) and/or to prevent new clots from forming in your body. Preventing harmful blood clots helps to reduce the risk of a stroke or heart attack. Conditions that increase your risk of developing blood clots include a certain type of irregular heart rhythm (atrial fibrillation), heart valve replacement, recent heart attack, and certain surgeries (such as hip/knee replacement). Warfarin is commonly called a \"blood thinner,\" but the more correct term is \"anticoagulant.\" It helps to keep blood flowing smoothly in your body by decreasing the amount of certain substances (clotting proteins) in your blood.      HOW TO USE:  Read the Medication Guide provided by your pharmacist before you start taking warfarin and each time you get a refill. If you have any questions, ask your doctor or pharmacist. Take this medication by mouth with or without food as directed by your doctor or other health care professional, usually once a day. It is very important to take it exactly as directed. Do not increase the dose, take it more frequently, or stop using it unless directed by your doctor. Dosage is based on your medical condition, laboratory tests (such as INR), and response to treatment. Your doctor or other health care provider will monitor you closely while you are taking this medication to determine the right dose for you. Use this medication regularly to get the most benefit from it. To help you remember, take it at the same time each day. It is important to eat a balanced, consistent diet while taking warfarin. Some foods can affect how warfarin works in your body and may affect your treatment and dose. Avoid sudden large increases or decreases in " your intake of foods high in vitamin K (such as broccoli, cauliflower, cabbage, brussels sprouts, kale, spinach, and other green leafy vegetables, liver, green tea, certain vitamin supplements). If you are trying to lose weight, check with your doctor before you try to go on a diet. Cranberry products may also affect how your warfarin works. Limit the amount of cranberry juice (16 ounces/480 milliliters a day) or other cranberry products you may drink or eat.      SIDE EFFECTS:  Nausea, loss of appetite, or stomach/abdominal pain may occur. If any of these effects persist or worsen, tell your doctor or pharmacist promptly. Remember that your doctor has prescribed this medication because he or she has judged that the benefit to you is greater than the risk of side effects. Many people using this medication do not have serious side effects. This medication can cause serious bleeding if it affects your blood clotting proteins too much (shown by unusually high INR lab results). Even if your doctor stops your medication, this risk of bleeding can continue for up to a week. Tell your doctor right away if you have any signs of serious bleeding, including: unusual pain/swelling/discomfort, unusual/easy bruising, prolonged bleeding from cuts or gums, persistent/frequent nosebleeds, unusually heavy/prolonged menstrual flow, pink/dark urine, coughing up blood, vomit that is bloody or looks like coffee grounds, severe headache, dizziness/fainting, unusual or persistent tiredness/weakness, bloody/black/tarry stools, chest pain, shortness of breath, difficulty swallowing. Tell your doctor right away if any of these unlikely but serious side effects occur: persistent nausea/vomiting, severe stomach/abdominal pain, yellowing eyes/skin. This drug rarely has caused very serious (possibly fatal) problems if its effects lead to small blood clots (usually at the beginning of treatment). This can lead to severe skin/tissue damage that  may require surgery or amputation if left untreated. Patients with certain blood conditions (protein C or S deficiency) may be at greater risk. Get medical help right away if any of these rare but serious side effects occur: painful/red/purplish patches on the skin (such as on the toe, breast, abdomen), change in the amount of urine, vision changes, confusion, slurred speech, weakness on one side of the body. A very serious allergic reaction to this drug is rare. However, get medical help right away if you notice any symptoms of a serious allergic reaction, including: rash, itching/swelling (especially of the face/tongue/throat), severe dizziness, trouble breathing. This is not a complete list of possible side effects. If you notice other effects not listed above, contact your doctor or pharmacist. In the [place] - Call your doctor for medical advice about side effects. You may report side effects to FDA at 9-466-HDD-0744. In [place] - Call your doctor for medical advice about side effects. You may report side effects to [facility] [place] at 1-907.114.5940.      PRECAUTIONS:  Before taking warfarin, tell your doctor or pharmacist if you are allergic to it; or if you have any other allergies. This product may contain inactive ingredients, which can cause allergic reactions or other problems. Talk to your pharmacist for more details. Before using this medication, tell your doctor or pharmacist your medical history, especially of: blood disorders (such as anemia, hemophilia), bleeding problems (such as bleeding of the stomach/intestines, bleeding in the brain), blood vessel disorders (such as aneurysms), recent major injury/surgery, liver disease, alcohol use, mental/mood disorders (including memory problems), frequent falls/injuries. It is important that all your doctors and dentists know that you take warfarin. Before having surgery or any medical/dental procedures, tell your doctor or dentist that you are taking this  medication and about all the products you use (including prescription drugs, nonprescription drugs, and herbal products). Avoid getting injections into the muscles. If you must have an injection into a muscle (for example, a flu shot), it should be given in the arm. This way, it will be easier to check for bleeding and/or apply pressure bandages. This medication may cause stomach bleeding. Daily use of alcohol while using this medicine will increase your risk for stomach bleeding and may also affect how this medication works. Limit or avoid alcoholic beverages. If you have not been eating well, if you have an illness or infection that causes fever, vomiting, or diarrhea for more than 2 days, or if you start using any antibiotic medications, contact your doctor or pharmacist immediately because these conditions can affect how warfarin works. This medication can cause heavy bleeding. To lower the chance of getting cut, bruised, or injured, use great caution with sharp objects like safety razors and nail cutters. Use an electric razor when shaving and a soft toothbrush when brushing your teeth. Avoid activities such as contact sports. If you fall or injure yourself, especially if you hit your head, call your doctor immediately. Your doctor may need to check you. The Food & Drug Administration has stated that generic warfarin products are interchangeable. However, consult your doctor or pharmacist before switching warfarin products. Be careful not to take more than one medication that contains warfarin unless specifically directed by the doctor or health care provider who is monitoring your warfarin treatment. Older adults may be at greater risk for bleeding while using this drug. This medication is not recommended for use during pregnancy because of serious (possibly fatal) harm to an unborn baby. Discuss the use of reliable forms of birth control with your doctor. If you become pregnant or think you may be pregnant,  "tell your doctor immediately. If you are planning pregnancy, discuss a plan for managing your condition with your doctor before you become pregnant. Your doctor may switch the type of medication you use during pregnancy. Very small amounts of this medication may pass into breast milk but is unlikely to harm a nursing infant. Consult your doctor before breast-feeding.      DRUG INTERACTIONS:  Drug interactions may change how your medications work or increase your risk for serious side effects. This document does not contain all possible drug interactions. Keep a list of all the products you use (including prescription/nonprescription drugs and herbal products) and share it with your doctor and pharmacist. Do not start, stop, or change the dosage of any medicines without your doctor's approval. Warfarin interacts with many prescription, nonprescription, vitamin, and herbal products. This includes medications that are applied to the skin or inside the vagina or rectum. The interactions with warfarin usually result in an increase or decrease in the \"blood-thinning\" (anticoagulant) effect. Your doctor or other health care professional should closely monitor you to prevent serious bleeding or clotting problems. While taking warfarin, it is very important to tell your doctor or pharmacist of any changes in medications, vitamins, or herbal products that you are taking. Some products that may interact with this drug include: capecitabine, imatinib, mifepristone. Aspirin, aspirin-like drugs (salicylates), and nonsteroidal anti-inflammatory drugs (NSAIDs such as ibuprofen, naproxen, celecoxib) may have effects similar to warfarin. These drugs may increase the risk of bleeding problems if taken during treatment with warfarin. Carefully check all prescription/nonprescription product labels (including drugs applied to the skin such as pain-relieving creams) since the products may contain NSAIDs or salicylates. Talk to your doctor " about using a different medication (such as acetaminophen) to treat pain/fever. Low-dose aspirin and related drugs (such as clopidogrel, ticlopidine) should be continued if prescribed by your doctor for specific medical reasons such as heart attack or stroke prevention. Consult your doctor or pharmacist for more details. Many herbal products interact with warfarin. Tell your doctor before taking any herbal products, especially bromelains, coenzyme Q10, cranberry, danshen, dong quai, fenugreek, garlic, ginkgo biloba, ginseng, and Jimmy's wort, among others. This medication may interfere with a certain laboratory test to measure theophylline levels, possibly causing false test results. Make sure laboratory personnel and all your doctors know you use this drug.      OVERDOSE:  If overdose is suspected, contact a poison control center or emergency room immediately. [place] residents can call the [place] National Poison Hotline at 1-552.576.9292. [place] residents can call a provincial poison control center. Symptoms of overdose may include: bloody/black/tarry stools, pink/dark urine, unusual/prolonged bleeding.      NOTES:  Do not share this medication with others. Laboratory and/or medical tests (such as INR, complete blood count) must be performed periodically to monitor your progress or check for side effects. Consult your doctor for more details.      MISSED DOSE:  For the best possible benefit, do not miss any doses. If you do miss a dose and remember on the same day, take it as soon as you remember. If you remember on the next day, skip the missed dose and resume your usual dosing schedule. Do not double the dose to catch up because this could increase your risk for bleeding. Keep a record of missed doses to give to your doctor or pharmacist. Contact your doctor or pharmacist if you miss 2 or more doses in a row.      STORAGE:  Store at room temperature away from light and moisture. Do not store in the bathroom. Keep all  medications away from children and pets. Do not flush medications down the toilet or pour them into a drain unless instructed to do so. Properly discard this product when it is  or no longer needed. Consult your pharmacist or local waste disposal company for more details about how to safely discard your product.      MEDICAL ALERT:  Your condition and medication can cause complications in a medical emergency. For information about enrolling in MedicAlert, call 1-130.871.9936 ([place]) or 1-695.301.9312 ([place]).      Information last revised 2010 Copyright(c) 2010 [employer]             Depression / Suicide Risk    As you are discharged from this [facility] facility, it is important to learn how to keep safe from harming yourself.    Recognize the warning signs:  · Abrupt changes in personality, positive or negative- including increase in energy   · Giving away possessions  · Change in eating patterns- significant weight changes-  positive or negative  · Change in sleeping patterns- unable to sleep or sleeping all the time   · Unwillingness or inability to communicate  · Depression  · Unusual sadness, discouragement and loneliness  · Talk of wanting to die  · Neglect of personal appearance   · Rebelliousness- reckless behavior  · Withdrawal from people/activities they love  · Confusion- inability to concentrate     If you or a loved one observes any of these behaviors or has concerns about self-harm, here's what you can do:  · Talk about it- your feelings and reasons for harming yourself  · Remove any means that you might use to hurt yourself (examples: pills, rope, extension cords, firearm)  · Get professional help from the community (Mental Health, Substance Abuse, psychological counseling)  · Do not be alone:Call your Safe Contact- someone whom you trust who will be there for you.  · Call your local CRISIS HOTLINE 088-9298 or 405-402-7852  · Call your local Children's Mobile Crisis Response  Team [facility] (423) 028-7024 or www.dscovered.FastFig  · Call the toll free National Suicide Prevention Hotlines   · National Suicide Prevention Lifeline 232-415-REFX (3590)  · National Hope Line Network 800-SUICIDE (961-4166)    F/u pcp asap

## 2019-10-12 NOTE — CARE PLAN
Problem: Safety  Goal: Will remain free from injury  Outcome: PROGRESSING SLOWER THAN EXPECTED  Note:   Fall precautions in place. Bed in low locked position. Call bell and personal items in reach. Educated patient to call for assistance before getting out of bed. Treaded socks intact. Pt mobility signaged updated.         Problem: Knowledge Deficit  Goal: Knowledge of disease process/condition, treatment plan, diagnostic tests, and medications will improve  Outcome: PROGRESSING SLOWER THAN EXPECTED  Note:   Pt oriented to room, and unit routine.  Patient able to verbalize understanding of reason for hospitalization and is a participant in the plan of care. White board updated to reflect plan of care for current shift.
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
## 2019-10-12 NOTE — PROGRESS NOTES
[facility] Medicine Daily Progress Note    Date of Service  10/12/2019    Chief Complaint  89 y.o. male admitted 10/8/2019 with sob         Interval Problem Update  Breathing is better     Sob with exertion    Intermittent confusion    Patient is walking slowly,  in hallways    Afebrile    bnp elevated at 3558    bp is low normal    Cr worse at 2.32- his baseline cr is not known but he does has CKD stage 3    Consultants/Specialty  none  Code Status  dnr    Disposition  home    Review of Systems  Review of Systems   Constitutional: Positive for malaise/fatigue.   HENT: Negative.    Eyes: Negative.    Respiratory: Positive for shortness of breath.    Cardiovascular: Negative.    Genitourinary: Negative.    Musculoskeletal: Negative.    Neurological: Negative.    Psychiatric/Behavioral: Negative.    All other systems reviewed and are negative.       Physical Exam  Temp:  [36.2 °C (97.1 °F)-37 °C (98.6 °F)] 36.8 °C (98.3 °F)  Pulse:  [58-75] 60  Resp:  [16-20] 16  BP: (108-154)/(54-74) 108/54  SpO2:  [89 %-96 %] 94 %    Physical Exam   Constitutional: He is oriented to person, place, and time. No distress.   HENT:   Mouth/Throat: No oropharyngeal exudate.   Eyes: Left eye exhibits no discharge.   Neck: No JVD present. No tracheal deviation present. No thyromegaly present.   Cardiovascular: Normal rate and intact distal pulses. Exam reveals no friction rub.   Murmur heard.  Pulmonary/Chest: He has no wheezes.   Abdominal: Soft. Bowel sounds are normal. He exhibits no distension.   Musculoskeletal: Normal range of motion. He exhibits edema (+1 edema both LE). He exhibits no deformity.   Neurological: He is alert and oriented to person, place, and time. No cranial nerve deficit.   Skin: No rash noted. He is not diaphoretic. No erythema.   Psychiatric: He has a normal mood and affect.       Fluids    Intake/Output Summary (Last 24 hours) at 10/12/2019 1116  Last data filed at 10/12/2019 0700  Gross per 24 hour   Intake 480  ml   Output 1200 ml   Net -720 ml       Laboratory  Recent Labs     10/10/19  0209   WBC 11.1*   RBC 4.21*   HEMOGLOBIN 13.3*   HEMATOCRIT 41.6*   MCV 98.8*   MCH 31.6   MCHC 32.0*   RDW 57.6*   PLATELETCT 172   MPV 10.6     Recent Labs     10/10/19  0209 10/11/19  0225   SODIUM 141 143   POTASSIUM 4.4 4.1   CHLORIDE 108 105   CO2 20 25   GLUCOSE 104* 83   BUN 50* 61*   CREATININE 2.22* 2.32*   CALCIUM 9.0 9.5     Recent Labs     10/10/19  0209 10/11/19  0225 10/12/19  0821   INR 3.54* 2.27* 1.64*               Imaging  No orders to display        Assessment/Plan  Acute on chronic diastolic congestive heart failure (HCC)- (present on admission)  Assessment & Plan  Po  diuresis, strict I's and O's, echo pending.    Po  diuresis  Started on 10/12    Acute respiratory failure with hypoxia (HCC)- (present on admission)  Assessment & Plan  Secondary to diastolic CHF exacerbation and bronchitis  Continue respiratory care per protocol  Continue oxygen per protocol,      Diuresis to po    Bronchitis, acute, with bronchospasm- (present on admission)  Assessment & Plan  Likely acute bronchitis,    abx completed    Steroids stopped)-contributing to confusion?      Paroxysmal atrial fibrillation (HCC)- (present on admission)  Assessment & Plan  Continue Cardizem(dose reduced  On 10/12) and Coumadin, stable    Essential hypertension- (present on admission)  Assessment & Plan  Continue Cardizem and losartan.  Continue monitoring    Moderate aortic stenosis- (present on admission)  Assessment & Plan  Monitor volume and respiratory status closely  Echo pending    History of TIA (transient ischemic attack) and stroke- (present on admission)  Assessment & Plan  Continue Coumadin.  No neurological deficit    Acute bronchitis due to other specified organisms- (present on admission)  Assessment & Plan  Po zithromax x 3 days    Acute renal failure superimposed on stage 3 chronic kidney disease (HCC)- (present on admission)  Assessment &  Plan  Lasix changed to po on 10/12    Hold cozaar due to rita    Decrease cardizem due to low normal bp     Monitor bmp    Renal dose all medications    Elevated troponin- (present on admission)  Assessment & Plan  Type II NSTEMI in the setting of acute respiratory failure with hypoxia  No chest pain   check am bmp, bnp    VTE prophylaxis: scd

## 2019-10-12 NOTE — DISCHARGE PLANNING
Dr. Beckford requested SW follow up with family on their preference. EZIO talked with family about D/C options. Family can wait for Hospice RN to assess prior to going home, or family can take patient home and hospice will follow up at home.  Family would like to take patient home today. SW had patient's family sign choice form for hospice, as none had been signed.     EZIO faxed choice form to [facility] [phone]    SW contacted Hospice, informed them that family is D/C today. [facility] reported they are available for assessment tomorrow 10/13 afternoon. They reported they will follow up with family on facesheet.     EZIO informed Dr. Beckford that family wishes to D/C home with hospice coming in tomorrow for assessment. Dr. Beckford reports he will complete D/C Summary. Patient and family aware that Hospice will come by tomorrow afternoon for assessment.

## 2019-10-12 NOTE — DISCHARGE PLANNING
SW received information from RN that patient is ready to be D/C today, has accepted [facility] as his provider. Family is here to transport him home.   SW reviewed chart and see assessment completed by SW, no oxygen or ADL issues or needs.     SW contacted [facility] to verify that patient is accepted. On call worker took EZIO information to have someone call back to verify that patient is accepted and they will follow up.    Awaiting response from [facility].

## 2019-10-12 NOTE — PROGRESS NOTES
Received pt report from day RN Janene.  Pt awake, alert  Pt resting in bed.    Bed in low locked position, call bell at the bedside, tray table & personal belongings within reach.  Non-skid footwear intact.  White board updated to reflect plan of care for current shift.

## 2019-10-12 NOTE — DISCHARGE PLANNING
SW texted Dr. Beckford reporting that patient will be assessed by [facility]. Does Dr. Beckford want him to be assessed in hospital or is it okay for patient to return home, and [facility] follow up at home?    Awaiting Response, as per RN, family wants to take patient home.

## 2019-10-12 NOTE — PROGRESS NOTES
Inpatient Anticoagulation Service Note    Date: 10/12/2019    Reason for Anticoagulation: Atrial Fibrillation   Target INR: 2.0 to 3.0  [id number] VASc Score: 6  HAS-BLED Score: 2   Hemoglobin Value: (!) 13.3  Hematocrit Value: (!) 41.6    INR from last 7 days     Date/Time INR Value    10/12/19 0821  (!) 1.64    10/11/19 0225  (!) 2.27    10/10/19 0209  (!) 3.54        Dose from last 7 days     Date/Time Dose (mg)    10/12/19 1300  5    10/11/19 1400  3    10/10/19 0957  0    10/09/19 0357  0        Average Dose (mg): (2.5 mg (5 mg x 0.5) every Tue, Sat; 5 mg (5 mg x 1) all other days)  Significant Interactions: Antibiotics, Corticosteroids  Bridge Therapy: No    Reversal Agent Administered: Not Applicable    Comments:   89 y.o. male admitted 10/8/2019 with sob. PMH significant for AFib, HTN, Hx of TIA, Hx DVT/PE, cardiac pacemaker, prostate cancer. Warfarin for AFIB , on Cardizem. INR supra-therapeutic on admit. DDI noted. Last H/H low , decreased over interval. No documented signs of overt bleeding. INR currently below goal , large decrease over interval.      Plan:  Warfarin 5 mg po daily for now , INR in AM   Education Material Provided?: No(chronic warfarin patient)  Pharmacist suggested discharge dosing: Warfarin 3-4 mg po daily , follow up with anticoagulation clinic after discharge.      David SchultzD BCPS

## 2019-10-12 NOTE — PROGRESS NOTES
Monitor Summary:    Chronic A-Fib (Paced)  60-85 bpm  Bigeminy, PVCs, & Couplets noted.    N/A/0.10/N/A

## 2019-10-12 NOTE — DISCHARGE PLANNING
Received Choice form at 1357  Agency/Facility Name: [facility]  Referral sent per Choice form @ 0922

## 2019-10-13 PROBLEM — N18.4 ACUTE RENAL FAILURE SUPERIMPOSED ON STAGE 4 CHRONIC KIDNEY DISEASE (HCC): Status: ACTIVE | Noted: 2019-01-01

## 2019-10-13 PROBLEM — N18.4 STAGE 4 CHRONIC KIDNEY DISEASE (HCC): Status: ACTIVE | Noted: 2019-01-01

## 2019-10-13 NOTE — DISCHARGE SUMMARY
Discharge Summary    CHIEF COMPLAINT ON ADMISSION  Chief Complaint   Patient presents with   • Sent from Urgent Care     r/o pneumonia   • Cough     x2days       Reason for Admission  Sent UC; Coughing     Admission Date  10/8/2019    CODE STATUS  Prior    HPI & HOSPITAL COURSE  As per dr wing h+p    89 y.o. male with a past medical history of atrial fibrillation on Coumadin, hypertension, congestive heart failure with diastolic dysfunction, moderate aortic stenosis, stroke who presented 10/8/2019 with shortness of breath for the past 2 days.  The patient reports a productive cough with dyspnea and wheezing.  He denies any chest pain, fevers, chills or lightheadedness.  He denies any history of asthma or COPD.  He is a never smoker.  Patient presented to an urgent care and underwent a chest x-ray that revealed increasing perihilar and basilar pulmonary opacifications, could be due to pulmonary edema or inflammation such as bronchitis or pneumonitis.  Small bilateral pleural fluid collections are increased compared to prior exam.  He was transferred to [facility] for further care.  In the ER the patient was noted to be hypoxic and was placed on 3 L of oxygen.    The patient was diuresed with IV diuretics.  Patient given p.o. Zithromax for possible acute bronchitis.  Patient given p.o. steroids for acute bronchospasm.  Improved on a daily basis but with nursing slight worsening in his kidney function which will be titrated down the diuretics and wanted to monitor further in the hospital however the patient was getting very agitated in the hospital and daughters felt that he be better suited at home.  Patient was referred to hospice        Patient reviewed all labs it looks like patient has chronic kidney disease stage III or IV    Therefore, he is discharged in good and stable condition to home with close outpatient follow-up.    The patient met 2-midnight criteria for an inpatient stay at the time of  discharge.    Discharge Date  10/12/2019    FOLLOW UP ITEMS POST DISCHARGE      DISCHARGE DIAGNOSES  Active Problems:    Essential hypertension POA: Yes    Paroxysmal atrial fibrillation (HCC) (Chronic) POA: Yes    History of TIA (transient ischemic attack) and stroke POA: Yes    Moderate aortic stenosis POA: Yes      Overview: October 2018: Echocardiogram with normal LV size, moderate concentric LVH,       LVEF 60%. Trace MR, moderate AS (peak 40mmHg, mean 23mmHg), no AI.       Moderate TR. RVSP 45mmHg.    Acute renal failure superimposed on stage 3 chronic kidney disease (HCC) POA: Yes  Resolved Problems:    Bronchitis, acute, with bronchospasm POA: Yes    Acute respiratory failure with hypoxia (HCC) POA: Yes    Acute on chronic diastolic congestive heart failure (HCC) POA: Yes    Elevated troponin POA: Yes    Acute bronchitis due to other specified organisms POA: Yes      FOLLOW UP  Future Appointments   Date Time Provider Department Center   10/22/2019  3:30 PM VISTA PHARMACIST [facility]   11/14/2019 10:40 AM Vicki Escobar, A.P.R.N. [facility]   12/3/2019  9:15 AM PACER CHECK-CAM B 2 [facility] None   12/3/2019  9:45 AM Bentley Marshall M.D. [facility] None     Vicki Escobar, A.P.R.N.  [address]  [place] [address]  [phone]            MEDICATIONS ON DISCHARGE     Medication List      START taking these medications      Instructions   furosemide 20 MG Tabs  Commonly known as:  LASIX   Take 1 Tab by mouth every day.  Dose:  20 mg     potassium chloride SA 20 MEQ Tbcr  Commonly known as:  Kdur   Take 1 Tab by mouth every day.  Dose:  20 mEq        CHANGE how you take these medications      Instructions   DILTIAZem  MG Cp24  What changed:    · medication strength  · how much to take  Commonly known as:  CARDIZEM CD   Take 1 Cap by mouth every day.  Dose:  120 mg        CONTINUE taking these medications      Instructions   Cetirizine HCl 10 MG Caps   Take 1 tablet by mouth every day at 6 PM.  Dose:  1  tablet     Magnesium 400 MG Tabs   Take 400 mg by mouth every day.  Dose:  400 mg     triamcinolone 0.5 % ointment  Commonly known as:  ARISTOCORT   Apply 1 Application to affected area(s) 2 Times a Day.  Dose:  1 Application     Vitamin D3 5000 units Tabs   Take 5,000 Units by mouth every day at 6 PM.  Dose:  5,000 Units     warfarin 5 MG Tabs  Commonly known as:  COUMADIN   TAKE 1-1.5 TABLETS BY MOUTH OR AS DIRECTED BY COUMADIN CLINIC        STOP taking these medications    losartan 25 MG Tabs  Commonly known as:  COZAAR            Allergies  Allergies   Allergen Reactions   • Iodine        DIET  No orders of the defined types were placed in this encounter.      ACTIVITY  As tolerated.  Weight bearing as tolerated    CONSULTATIONS      PROCEDURES      LABORATORY  Lab Results   Component Value Date    SODIUM 143 10/11/2019    POTASSIUM 4.1 10/11/2019    CHLORIDE 105 10/11/2019    CO2 25 10/11/2019    GLUCOSE 83 10/11/2019    BUN 61 (H) 10/11/2019    CREATININE 2.32 (H) 10/11/2019        Lab Results   Component Value Date    WBC 11.1 (H) 10/10/2019    HEMOGLOBIN 13.3 (L) 10/10/2019    HEMATOCRIT 41.6 (L) 10/10/2019    PLATELETCT 172 10/10/2019        Total time of the discharge process exceeds 38  minutes.

## 2019-10-22 NOTE — PROGRESS NOTES
Anticoagulation Summary  As of 10/22/2019    INR goal:   2.0-3.0   TTR:   78.5 % (4.3 y)   INR used for dosing:   3.30! (10/22/2019)   Warfarin maintenance plan:   2.5 mg (5 mg x 0.5) every Tue, Thu, Sat; 5 mg (5 mg x 1) all other days   Weekly warfarin total:   27.5 mg   Plan last modified:   Marion GarrisonD (10/22/2019)   Next INR check:   10/29/2019   Target end date:   Indefinite    Indications    Chronic anticoagulation [Z79.01]  Paroxysmal atrial fibrillation (HCC) [I48.0]  History of TIA (transient ischemic attack) and stroke [Z86.73]             Anticoagulation Episode Summary     INR check location:   Clinic Lab    Preferred lab:       Send INR reminders to:       Comments:         Anticoagulation Care Providers     Provider Role Specialty Phone number    Taz Jc M.D. Referring Cardiology [phone]    Marion MackenzieD Responsible          Anticoagulation Patient Findings  Patient Findings     Positives:   Change in medications, Hospital admission    Negatives:   Signs/symptoms of thrombosis, Signs/symptoms of bleeding, Laboratory test error suspected, Change in health, Change in alcohol use, Change in activity, Upcoming invasive procedure, Emergency department visit, Upcoming dental procedure, Missed doses, Extra doses, Change in diet/appetite, Bruising, Other complaints        HPI:   Primitivo Milan seen in clinic today, on anticoagulation therapy with warfarin for stroke prevention due to history of PAF and TIA.    Patient's previous INR was subtherapeutic at 1.64 on 10-12-19 (inpatient), at which time patient was instructed to return to home warfarin dosing regimen.  He returns to clinic today to recheck INR to ensure it is therapeutic and thus preventing possible clotting and/or bleeding/bruising complications.    CHADS-VASc = at least 5  (unadjusted ischemic stroke risk/year:  7.2%, which is high risk given hx of TIA.)    Does patient have any changes to current medical/health  status since last appt (Y/N):  Hospital admission for several days for suspected pneumonia and pulmonary edema.  Does patient have any signs/symptoms of bleeding and/or thrombosis since the last appt (Y/N):  NO  Does patient have any interval changes to diet or medications since last appt (Y/N):  Started on furosemide and KCl  Are there any complications or cost restrictions with current therapy (Y/N):  NO     Does patient have [facility] PCP? YES, Vicki SHIPLEY (If not, please document discussion that patient must be seen at [facility])       Vitals:  declined by patient at today's visit.  There were no vitals filed for this visit.     Asssessment:      INR supratherapeutic at 3.3, therefore increasing patient's risk of bleeding complications.   Reason(s) for out of range INR today:  Recent hospital stay, passing of his wife last week.      Plan:  Instructed patient to decrease weekly warfarin regimen by ~8% as detailed above in order to bring INR to therapeutic range.     Follow up:  Because warfarin is a high risk medication and current CHEST guidelines recommend regular monitoring intervals (few days up to 12 weeks), will have patient return to clinic in 1 weeks to recheck INR.    Sánchez Ochoa, PharmD, BCACP

## 2019-10-23 NOTE — TELEPHONE ENCOUNTER
Requested Prescriptions     Pending Prescriptions Disp Refills   • furosemide (LASIX) 20 MG Tab 30 Tab 0     Sig: Take 1 Tab by mouth every day.   • potassium chloride SA (KDUR) 20 MEQ Tab CR 30 Tab 0     Sig: Take 1 Tab by mouth every day.       BRITNEY Vaca.

## 2019-10-24 NOTE — TELEPHONE ENCOUNTER
LM for patient's daughter regarding upcoming appointment.  [facility] has opened services to patient and order sent to have INR drawn during visit on 10-29-19.  Cancelled anticoagulation clinic visit scheduled for that same day.  Asked that patient return call with any questions or concerns.  Sánchez Ochoa, PharmD, BCACP

## 2019-10-28 PROBLEM — Z09 HOSPITAL DISCHARGE FOLLOW-UP: Status: ACTIVE | Noted: 2019-01-01

## 2019-10-28 NOTE — ASSESSMENT & PLAN NOTE
"The patient was seen in urgent care on 10/8/2019 with complaints of cough and sinus congestion for 3 days.  Patient's daughter states that he has similar symptoms yearly around this time, is treated with antibiotics, and symptoms resolved.  While in the urgent care, the patient's SPO2 was 88%-91% on room air.  The patient was placed on 2 L/min supplemental oxygen, oxygen saturations increased to 95%.  The patient had a chest x-ray and was advised to be seen in the emergency room for possible pneumonia.  The patient went to the emergency room and was admitted from 10/8/2019 to 10/12/2019. While in the emergency room, the patient was noted to be hypoxic and placed on 3 L/min of oxygen.  The patient received IV diuretics and oral Zithromax for possible acute bronchitis.  The patient was also given oral steroids for acute bronchospasm.  Patient symptoms improved daily, the patient had slight worsening in kidney function with diuresis.  The patient became agitated in the hospital and the patient's daughter felt that he would do better at home, so the patient was discharged and referred to hospice.  The patient's daughter states that she thinks that he may have had a reaction to the steroids as his ALOC started after receiving the medication.  Patients daughter reports that he was having nightmares, became confused, was belligerent.  The patient was discharged and went home on Saturday, was still confused, but when he woke up on Sunday morning he was \"more himself\".  The patient did not qualify for hospice as his confusion had resolved by the time the hospice evaluation took place.  The patient was then given orders for home health 1 time per week, had a physical therapy evaluation, and an RN will be coming to the house to do INR readings.  Patient reports today that he is feeling well and back to normal.  Patient's room air oxygen saturations are 95% and other vital signs are stable.  "

## 2019-10-28 NOTE — PROGRESS NOTES
"Subjective:     Chief Complaint   Patient presents with   • Hospital Follow-up     Bronchitis     Primitivo Milan is a 89 y.o. male who presents for Hospital Follow-up.    Discharge Date: 10/12/2019    HPI:   Hospital discharge follow-up  The patient was seen in urgent care on 10/8/2019 with complaints of cough and sinus congestion for 3 days.  Patient's daughter states that he has similar symptoms yearly around this time, is treated with antibiotics, and symptoms resolved.  While in the urgent care, the patient's SPO2 was 88%-91% on room air.  The patient was placed on 2 L/min supplemental oxygen, oxygen saturations increased to 95%.  The patient had a chest x-ray and was advised to be seen in the emergency room for possible pneumonia.  The patient went to the emergency room and was admitted from 10/8/2019 to 10/12/2019. While in the emergency room, the patient was noted to be hypoxic and placed on 3 L/min of oxygen.  The patient received IV diuretics and oral Zithromax for possible acute bronchitis.  The patient was also given oral steroids for acute bronchospasm.  Patient symptoms improved daily, the patient had slight worsening in kidney function with diuresis.  The patient became agitated in the hospital and the patient's daughter felt that he would do better at home, so the patient was discharged and referred to hospice.  The patient's daughter states that she thinks that he may have had a reaction to the steroids as his ALOC started after receiving the medication.  Patients daughter reports that he was having nightmares, became confused, was belligerent.  The patient was discharged and went home on Saturday, was still confused, but when he woke up on Sunday morning he was \"more himself\".  The patient did not qualify for hospice as his confusion had resolved by the time the hospice evaluation took place.  The patient was then given orders for home health 1 time per week, had a physical therapy " evaluation, and an RN will be coming to the house to do INR readings.  Patient reports today that he is feeling well and back to normal.  Patient's room air oxygen saturations are 95% and other vital signs are stable.    Current medicines (including reconciliation performed today)  Current Outpatient Medications   Medication Sig Dispense Refill   • furosemide (LASIX) 20 MG Tab Take 1 Tab by mouth every day. 30 Tab 0   • potassium chloride SA (KDUR) 20 MEQ Tab CR Take 1 Tab by mouth every day. 30 Tab 0   • DILTIAZem CD (CARDIZEM CD) 120 MG CAPSULE SR 24 HR Take 1 Cap by mouth every day. 30 Cap 3   • warfarin (COUMADIN) 5 MG Tab TAKE 1-1.5 TABLETS BY MOUTH OR AS DIRECTED BY COUMADIN CLINIC 135 Tab 1   • Magnesium 400 MG Tab Take 400 mg by mouth every day.     • Cholecalciferol (VITAMIN D3) 5000 units Tab Take 5,000 Units by mouth every day at 6 PM.       No current facility-administered medications for this visit.      Allergies:   Iodine    Social History:  Social History     Socioeconomic History   • Marital status:      Spouse name: Not on file   • Number of children: Not on file   • Years of education: Not on file   • Highest education level: Not on file   Occupational History   • Not on file   Social Needs   • Financial resource strain: Not on file   • Food insecurity:     Worry: Not on file     Inability: Not on file   • Transportation needs:     Medical: Not on file     Non-medical: Not on file   Tobacco Use   • Smoking status: Never Smoker   • Smokeless tobacco: Never Used   Substance and Sexual Activity   • Alcohol use: No     Alcohol/week: 0.0 oz   • Drug use: No   • Sexual activity: Never     Partners: Female     Comment:  72 years 6/15   Lifestyle   • Physical activity:     Days per week: Not on file     Minutes per session: Not on file   • Stress: Not on file   Relationships   • Social connections:     Talks on phone: Not on file     Gets together: Not on file     Attends Synagogue service:  Not on file     Active member of club or organization: Not on file     Attends meetings of clubs or organizations: Not on file     Relationship status: Not on file   • Intimate partner violence:     Fear of current or ex partner: Not on file     Emotionally abused: Not on file     Physically abused: Not on file     Forced sexual activity: Not on file   Other Topics Concern   • Not on file   Social History Narrative    Retired from [employer].  on 10/14/2019 after 72 years of marriage.     ROS:   Constitutional:  Negative for fever, chills, unexpected weight change, night sweats, body aches, sleep issues, and fatigue/generalized weakness.   HEENT: Positive for bilateral hearing loss. Negative for headaches, vision changes, ear pain, tinnitus, ear discharge, rhinorrhea, sinus congestion, sneezing, sore throat, and neck pain.    Respiratory:  Negative for cough, shortness of breath, sputum production, hemoptysis, chest congestion, dyspnea, wheezing, and crackles.    Cardiovascular:  Negative for chest pain, palpitations, SMITH, paroxsymal nocturnal dyspnea, orthopnea, and bilateral lower extremity edema.   Gastrointestinal:  Negative for heartburn, nausea, vomiting, abdominal pain, hematochezia, melena, diarrhea, constipation, and greasy/foul-smelling stools.   Genitourinary: Positive for urinary incontinence. Negative for dysuria, nocturia, polyuria, hematuria, pyuria, urinary urgency, urinary frequency.   Musculoskeletal:  Negative for myalgias, back pain, and joint pain.   Skin: Negative for rash, sores, lumps, itching, cyanotic skin color change.   Neurological: Negative for dizziness, tingling, tremors, focal sensory deficit, focal weakness and headaches.   Endo/Heme/Allergies: Patient is on chronic anticoagulation with coumadin. Denies cold/heat intolerance.   Psychiatric/Behavioral: Negative for depression, suicidal/homicidal ideation and memory loss.        Objective:     /72 (BP Location:  "Left arm, Patient Position: Sitting, BP Cuff Size: Adult)   Pulse 73   Temp 36.4 °C (97.6 °F) (Temporal)   Ht 1.651 m (5' 5\")   Wt 73.9 kg (163 lb)   SpO2 95%   Body mass index is 27.12 kg/m².    Physical Exam:  General:  Normal appearing. No distress.  HEENT: Pupils reactive to light, unequal Left 3 Right 2. Bilateral baseline lid ptosis. Normocephalic. Eyes conjunctiva clear, ears normal shape and contour, bilateral hearing aides in place, nasal mucosa benign, oropharynx is without erythema, edema or exudates.   Neck:  Supple without JVD or bruit.  Pulmonary:  Clear to ausculation.  Normal effort. No rales, ronchi, or wheezing.  Cardiovascular: Murmur. Regular rate and rhythm. Carotid and radial pulses are intact and equal bilaterally.  Abdomen:  Soft, nontender, nondistended. Normal bowel sounds.  Neurologic:  Grossly nonfocal.  Skin:  Warm and dry.  No obvious lesions.  Musculoskeletal: Unsteady gait. No extremity cyanosis, clubbing, or edema.  Psych:  Normal mood and affect. Alert and oriented x3. Judgment and insight is normal.    Assessment and Plan:   1. Hospital discharge follow-up  2. Bronchitis  Patient reports that he is feeling better, patient's daughter reports that he is back to baseline.  Continue follow-up with home health, home PT, home INR monitoring.  Patient has a scheduled follow-up on 11/14/2019 to review chronic conditions.    I have reviewed all hospital records including lab results, progress notes, and admission and discharge summaries. I have discussed with patient possible factors that contributed to hospitalization and how we can prevent patient from ending up in the hospital again. Educated patient on s/sx to observe for and what action to take when these occur. Reviewed current medications and educated on importance of compliance with these medications all appropriate follow-up visits are scheduled.     - Chart and discharge summary were reviewed.   - Hospitalization and results " reviewed with patient.   - Medications reviewed including instructions regarding high risk medications, dosing and side effects.  - Recommended Services: No services needed at this time. Home health already ordered.  - Advance directive/POLST on file?  Yes    Follow-up:Return in 17 days (on 11/14/2019) for Follow-up, As needed.    LACE+ Historical Score Over Time (0-28: Low, 29-58: Medium, 59+: High): 79    Patient was seen for at least 25 minutes total face-to-face time with greater than 50% of that time spent on counseling and care coordination.

## 2019-10-29 NOTE — PROGRESS NOTES
Anticoagulation Summary  As of 10/29/2019    INR goal:   2.0-3.0   TTR:   78.3 % (4.4 y)   INR used for dosin.90 (10/29/2019)   Warfarin maintenance plan:   2.5 mg (5 mg x 0.5) every Tue, Thu, Sat; 5 mg (5 mg x 1) all other days   Weekly warfarin total:   27.5 mg   Plan last modified:   Sánchez Ochoa PharmD (10/22/2019)   Next INR check:   2019   Target end date:   Indefinite    Indications    Chronic anticoagulation [Z79.01]  Paroxysmal atrial fibrillation (HCC) [I48.0]  History of TIA (transient ischemic attack) and stroke [Z86.73]             Anticoagulation Episode Summary     INR check location:   Clinic Lab    Preferred lab:       Send INR reminders to:       Comments:   [facility] fax [phone]      Anticoagulation Care Providers     Provider Role Specialty Phone number    Taz Jc M.D. Referring Cardiology [phone]    Marion MackenzieD Responsible          Anticoagulation Patient Findings      Spoke with patient to report a therapeutic INR.    Pt instructed to continue with current warfarin dosing regimen, confirms dosing.   Pt denies any s/s of bleeding, bruising, clotting or any changes to diet or medication.    Will follow up in 1 week(s) via [facility].     Isi Montiel, MarionD

## 2019-11-06 NOTE — PROGRESS NOTES
Anticoagulation Summary  As of 2019    INR goal:   2.0-3.0   TTR:   78.4 % (4.4 y)   INR used for dosin.90 (2019)   Warfarin maintenance plan:   2.5 mg (5 mg x 0.5) every Tue, Thu, Sat; 5 mg (5 mg x 1) all other days   Weekly warfarin total:   27.5 mg   Plan last modified:   Sánchez Ochoa PharmD (10/22/2019)   Next INR check:   2019   Target end date:   Indefinite    Indications    Chronic anticoagulation [Z79.01]  Paroxysmal atrial fibrillation (HCC) [I48.0]  History of TIA (transient ischemic attack) and stroke [Z86.73]             Anticoagulation Episode Summary     INR check location:   Clinic Lab    Preferred lab:       Send INR reminders to:       Comments:   [facility] fax [phone]      Anticoagulation Care Providers     Provider Role Specialty Phone number    Taz Jc M.D. Referring Cardiology [phone]    Marion MackenzieD Responsible          Anticoagulation Patient Findings      Spoke with patient's daughter to report a therapeutic INR.    Pt instructed to continue with current warfarin dosing regimen, confirms dosing.   Pt denies any s/s of bleeding, bruising, clotting or any changes to diet or medication.      Pt's daughter believes he will be d/c'd from [facility] sometime next week, but is unsure of the day. She will call us back with the date.    Will follow up in 1 week(s) via [facility].     Isi Montiel, MarionD

## 2019-11-13 NOTE — PROGRESS NOTES
Anticoagulation Summary  As of 2019    INR goal:   2.0-3.0   TTR:   78.5 % (4.4 y)   INR used for dosin.60 (2019)   Warfarin maintenance plan:   2.5 mg (5 mg x 0.5) every Tue, Thu, Sat; 5 mg (5 mg x 1) all other days   Weekly warfarin total:   27.5 mg   Plan last modified:   Sánchez Ochoa PharmD (10/22/2019)   Next INR check:   2019   Target end date:   Indefinite    Indications    Chronic anticoagulation [Z79.01]  Paroxysmal atrial fibrillation (HCC) [I48.0]  History of TIA (transient ischemic attack) and stroke [Z86.73]             Anticoagulation Episode Summary     INR check location:   Clinic Lab    Preferred lab:       Send INR reminders to:       Comments:         Anticoagulation Care Providers     Provider Role Specialty Phone number    Taz Jc M.D. Referring Cardiology [phone]    Marion MackenzieD Responsible          Anticoagulation Patient Findings  Patient Findings     Negatives:   Signs/symptoms of thrombosis, Signs/symptoms of bleeding, Laboratory test error suspected, Change in health, Change in alcohol use, Change in activity, Upcoming invasive procedure, Emergency department visit, Upcoming dental procedure, Missed doses, Extra doses, Change in medications, Change in diet/appetite, Hospital admission, Bruising, Other complaints        Spoke with patient today regarding therapeutic INR of 2.6.  Patient denies any signs/symptoms of bruising or bleeding or any changes in diet and medications.  Instructed patient to call clinic with any questions or concerns.  Pt is to continue with current warfarin dosing regimen.  Follow up in 2 weeks, to reduce risk of adverse events related to this high risk medication,  Warfarin.    Sánchez Ochoa, PharmD, BCACP

## 2019-11-18 NOTE — TELEPHONE ENCOUNTER
Was the patient seen in the last year in this department? Yes 10/28/19    Does patient have an active prescription for medications requested? No     Received Request Via: Pharmacy

## 2019-11-19 NOTE — TELEPHONE ENCOUNTER
Requested Prescriptions     Pending Prescriptions Disp Refills   • potassium chloride SA (KDUR) 20 MEQ Tab CR [Pharmacy Med Name: POTASSIUM CL ER 20 MEQ TABLET] 30 Tab 1     Sig: TAKE 1 TABLET BY MOUTH EVERY DAY   • furosemide (LASIX) 20 MG Tab [Pharmacy Med Name: FUROSEMIDE 20 MG TABLET] 30 Tab 1     Sig: TAKE 1 TABLET BY MOUTH EVERY DAY       BRITNEY Vaca.

## 2019-11-21 NOTE — PROGRESS NOTES
Subjective:      Primitivo Milan is a 89 y.o. male who presents with Edema            This is a 89-year-old male with a history of hypertension, congestive heart failure, moderate aortic stenosis, stroke, and stage IV kidney disease.  He presents today for ongoing bilateral lower extremity edema and redness.  Patient was seen in the hospital approximately 6 weeks ago for pneumonia and has had persistent bilateral lower extremity edema and redness since then.  Daughter who is present today, and providing most of the history due to patient's hardness of hearing, states that this is improving on patient's 20 mg of Lasix daily.  Patient has been on Lasix for approximately 6 weeks.  Previously bilateral lower extremities were blistering with swelling but has swelling improved on Lasix skin and blistering improved as well.  Denies any fevers or chills.  Denies any chest pain or shortness of breath.      Review of Systems   Constitutional: Negative for chills and fever.   HENT: Positive for hearing loss.    Respiratory: Negative for cough, sputum production and shortness of breath.    Cardiovascular: Positive for leg swelling. Negative for chest pain and claudication.   Psychiatric/Behavioral: Positive for memory loss (Baseline).     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 Trace MR, moderate AS (peak 40mmHg, mean 23mmHg), no AI. Moderate TR. RVSP 45mmHg.   • Other dyspnea and  respiratory abnormality    • Other malignant lymphomas, unspecified site, extranodal and solid organ sites    • Paroxysmal atrial fibrillation (HCC)    • Personal history of malignant neoplasm of prostate    • Personal history of venous thrombosis and embolism    • Pneumonia due to infectious organism 12/11/2018   • Presence of permanent cardiac pacemaker 09/2009    Medtronic Versa VEDR01 implanted by Dr. Lexa Lopes.    • Prostate cancer (HCC) 1991    Treated with surgery   • Renal disorder 1970    stones   • Scalp laceration 4/17/2019   • Sleep apnea     Cannot use CPAP machine.   • Stroke (HCC) 1991   • TIA (transient ischemic attack)    • Trauma 4/17/2019        Past Surgical History:   Procedure Laterality Date   • RECOVERY  5/19/2011    Performed by SURGERY, IR-RECOVERY at SURGERY SAME DAY [facility]   • PACEMAKER INSERTION  September 2009    Medtronic Versa VEDR01 implanted by Dr. Lexa Lopes.   • EYE SURGERY  2006    left eye mass/ cancer/ s/p radiation   • LITHOTRIPSY  1995    left kidney x 3   • PROSTATECTOMY, RADICAL RETRO  1991   • EYE SURGERY         Allergies   Allergen Reactions   • Iodine        Current Outpatient Medications   Medication Sig Dispense Refill   • potassium chloride SA (KDUR) 20 MEQ Tab CR TAKE 1 TABLET BY MOUTH EVERY DAY 30 Tab 1   • furosemide (LASIX) 20 MG Tab TAKE 1 TABLET BY MOUTH EVERY DAY 30 Tab 1   • DILTIAZem CD (CARDIZEM CD) 120 MG CAPSULE SR 24 HR Take 1 Cap by mouth every day. 30 Cap 3   • warfarin (COUMADIN) 5 MG Tab TAKE 1-1.5 TABLETS BY MOUTH OR AS DIRECTED BY COUMADIN CLINIC 135 Tab 1   • Cholecalciferol (VITAMIN D3) 5000 units Tab Take 5,000 Units by mouth every day at 6 PM.     • Magnesium 400 MG Tab Take 400 mg by mouth every day.       No current facility-administered medications for this visit.         Family History   Problem Relation Age of Onset   • Heart Disease Mother    • Heart Attack Mother    • Heart Disease Father    • Heart Attack Father    • No Known  "Problems Maternal Grandmother    • No Known Problems Maternal Grandfather    • No Known Problems Paternal Grandmother    • No Known Problems Paternal Grandfather        Social History     Socioeconomic History   • Marital status:      Spouse name: Not on file   • Number of children: Not on file   • Years of education: Not on file   • Highest education level: Not on file   Occupational History   • Not on file   Social Needs   • Financial resource strain: Not on file   • Food insecurity:     Worry: Not on file     Inability: Not on file   • Transportation needs:     Medical: Not on file     Non-medical: Not on file   Tobacco Use   • Smoking status: Never Smoker   • Smokeless tobacco: Never Used   Substance and Sexual Activity   • Alcohol use: No     Alcohol/week: 0.0 oz   • Drug use: No   • Sexual activity: Never     Partners: Female     Comment:  72 years 6/15   Lifestyle   • Physical activity:     Days per week: Not on file     Minutes per session: Not on file   • Stress: Not on file   Relationships   • Social connections:     Talks on phone: Not on file     Gets together: Not on file     Attends Yazidism service: Not on file     Active member of club or organization: Not on file     Attends meetings of clubs or organizations: Not on file     Relationship status: Not on file   • Intimate partner violence:     Fear of current or ex partner: Not on file     Emotionally abused: Not on file     Physically abused: Not on file     Forced sexual activity: Not on file   Other Topics Concern   • Not on file   Social History Narrative    Retired from [employer].  on 10/14/2019 after 72 years of marriage.           Objective:     /74 (BP Location: Left arm, Patient Position: Sitting, BP Cuff Size: Adult)   Pulse 74   Temp 36.6 °C (97.8 °F)   Ht 1.676 m (5' 6\")   Wt 75.8 kg (167 lb)   SpO2 95%   BMI 26.95 kg/m²      Physical Exam  Vitals signs reviewed.   HENT:      Head: Normocephalic " and atraumatic.      Nose: Nose normal.      Mouth/Throat:      Mouth: Mucous membranes are moist.      Pharynx: Oropharynx is clear.   Eyes:      Extraocular Movements: Extraocular movements intact.   Neck:      Musculoskeletal: Neck rigidity (Baseline) present.   Cardiovascular:      Rate and Rhythm: Normal rate.   Pulmonary:      Effort: Pulmonary effort is normal.   Abdominal:      General: Abdomen is flat.   Musculoskeletal: Normal range of motion.      Right lower leg: Edema (2+) present.      Left lower leg: Edema (2+) present.   Skin:     General: Skin is warm and dry.      Findings: Rash (Hemosiderin deposition bilaterally lower extremities) present.   Neurological:      General: No focal deficit present.      Mental Status: He is alert.   Psychiatric:         Mood and Affect: Mood normal.         Behavior: Behavior normal.          Assessment/Plan:       1. Stage 4 chronic kidney disease (HCC)  2. Bilateral lower extremity edema  New problem to examiner.  Continue Lasix 20 mg daily.  Labs as below.  Follow-up pending lab results.  Follow-up sooner if worsening.  - Basic Metabolic Panel; Future  - MAGNESIUM; Future  - CALCIUM (CA); Future    Please note that this dictation was created using voice recognition software. I have worked with consultants from the vendor as well as technical experts from [facility] to optimize the interface. I have made every reasonable attempt to correct obvious errors, but I expect that there are errors of grammar and possibly content that I did not discover before finalizing the note.

## 2019-11-26 NOTE — PROGRESS NOTES
Anticoagulation Summary  As of 11/26/2019    INR goal:   2.0-3.0   TTR:   78.7 % (4.4 y)   INR used for dosing:   3.00 (11/26/2019)   Warfarin maintenance plan:   2.5 mg (5 mg x 0.5) every Tue, Thu, Sat; 5 mg (5 mg x 1) all other days   Weekly warfarin total:   27.5 mg   Plan last modified:   Marion GarrisonD (10/22/2019)   Next INR check:   12/17/2019   Target end date:   Indefinite    Indications    Chronic anticoagulation [Z79.01]  Paroxysmal atrial fibrillation (HCC) [I48.0]  History of TIA (transient ischemic attack) and stroke [Z86.73]             Anticoagulation Episode Summary     INR check location:   Clinic Lab    Preferred lab:       Send INR reminders to:       Comments:         Anticoagulation Care Providers     Provider Role Specialty Phone number    Taz Jc M.D. Referring Cardiology [phone]    Marion MackenzieD Responsible          Anticoagulation Patient Findings  Patient Findings     Negatives:   Signs/symptoms of thrombosis, Signs/symptoms of bleeding, Laboratory test error suspected, Change in health, Change in alcohol use, Change in activity, Upcoming invasive procedure, Emergency department visit, Upcoming dental procedure, Missed doses, Extra doses, Change in medications, Change in diet/appetite, Hospital admission, Bruising, Other complaints        HPI:   Primitivo Milan seen in clinic today, on anticoagulation therapy with warfarin for stroke prevention due to history of PAF and TIA.    Patient's previous INR was therapeutic at 2.6 on 11-12-19, at which time patient was instructed to continue with current warfarin regimen.  He returns to clinic today to recheck INR to ensure it is therapeutic and thus preventing possible clotting and/or bleeding/bruising complications.    CHADS-VASc = at least 5  (unadjusted ischemic stroke risk/year:  7.2%, which is high risk given hx of TIA)    Does patient have any changes to current medical/health status since last appt (Y/N):  BEKA  last week, a few scrapes to right knee  Does patient have any signs/symptoms of bleeding and/or thrombosis since the last appt (Y/N):  NO  Does patient have any interval changes to diet or medications since last appt (Y/N):  NO  Are there any complications or cost restrictions with current therapy (Y/N):  NO     Does patient have [facility] PCP? YES, Vicki SHIPLEY (If not, please document discussion that patient must be seen at [facility])       Vitals:  declined by patient at today's visit.    There were no vitals filed for this visit.     Asssessment:      INR remains therapeutic at 3.0, therefore decreasing patient's risk of stroke and/or bleeding complications.   Reason(s) for out of range INR today:  n/a      Plan:  Pt is to continue with current warfarin dosing regimen in order to maintain INR in therapeutic range.     Follow up:  Because warfarin is a high risk medication and current CHEST guidelines recommend regular monitoring intervals (few days up to 12 weeks), will have patient return to clinic in 3 weeks to recheck INR.    Sánchez Ochoa, PharmD, BCACP

## 2019-12-03 NOTE — PATIENT INSTRUCTIONS
You should take daily furosemide and potassium    Your weight should be less than 166 at home with minimal clothes and after emptying the bladder, check this each morning     If it is over 170 you need to take an extra furosemide and potassium     If it is over 170 at home please call us    Sodium should be equal to calories avoid foods that are more double sodium to calories    Please work on increasing these foods in your diet to increase your potassium levels    Highest potassium foods  Dried figs, molasses, seaweed    High potassium foods  Dried fruits (dates, prunes), nuts, avocados, bran cereal, wheat germ, lima beans    Potassium-rich food  Vegetables-spinach, tomatoes, broccoli, winter squash, beets, carrots, cauliflower, potatoes    Fruits-bananas, cantaloupe, kiwis, oranges, mangoes    Meats-ground beef, steak, pork, veal, lamb    *Adapted: Brit LAWLER. Hypokalemia. [place] Journal of Medicine 1998; 339:451.

## 2019-12-03 NOTE — PROGRESS NOTES
Chief Complaint   Patient presents with   • HTN (Controlled)     follow up       Subjective:   Primitivo Milan is a 89 y.o. male who presents today for follow-up of his history of atrial fibrillation now permanent appears based on pacemaker check is been in A. 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 Trace MR, moderate AS (peak 40mmHg, mean 23mmHg), no AI. Moderate TR. RVSP 45mmHg.   • Other dyspnea and respiratory abnormality    • Other malignant lymphomas, unspecified site, extranodal and solid organ sites    • Paroxysmal atrial fibrillation (HCC)    • Permanent atrial fibrillation - since 5/2019    • Personal history of malignant neoplasm of prostate    • Personal history of venous thrombosis and embolism    • Pneumonia due to infectious organism 12/11/2018   • Presence of permanent cardiac pacemaker 09/2009    Medtronic Versa VEDR01 implanted by Dr. Lexa Lopes.    • Prostate cancer (HCC) 1991    Treated with surgery   • Renal disorder 1970    stones   • Scalp laceration 4/17/2019   • Sleep apnea     Cannot use CPAP machine.   • Stroke (HCC) 1991   • TIA (transient ischemic attack)    • Trauma 4/17/2019     Past Surgical  History:   Procedure Laterality Date   • RECOVERY  5/19/2011    Performed by SURGERY, IR[facility]RECOVERY [facility]   • PACEMAKER INSERTION  September 2009    Medtronic Versa [id number] implanted by Dr. Lexa Lopes.   • EYE SURGERY  2006    left eye mass/ cancer/ s/p radiation   • LITHOTRIPSY  1995    left kidney x 3   • PROSTATECTOMY, RADICAL RETRO  1991   • EYE SURGERY       Family History   Problem Relation Age of Onset   • Heart Disease Mother    • Heart Attack Mother    • Heart Disease Father    • Heart Attack Father    • No Known Problems Maternal Grandmother    • No Known Problems Maternal Grandfather    • No Known Problems Paternal Grandmother    • No Known Problems Paternal Grandfather      Social History     Socioeconomic History   • Marital status:      Spouse name: Not on file   • Number of children: Not on file   • Years of education: Not on file   • Highest education level: Not on file   Occupational History   • Not on file   Social Needs   • Financial resource strain: Not on file   • Food insecurity:     Worry: Not on file     Inability: Not on file   • Transportation needs:     Medical: Not on file     Non-medical: Not on file   Tobacco Use   • Smoking status: Never Smoker   • Smokeless tobacco: Never Used   Substance and Sexual Activity   • Alcohol use: No     Alcohol/week: 0.0 oz   • Drug use: No   • Sexual activity: Never     Partners: Female     Comment:  72 years 6/15   Lifestyle   • Physical activity:     Days per week: Not on file     Minutes per session: Not on file   • Stress: Not on file   Relationships   • Social connections:     Talks on phone: Not on file     Gets together: Not on file     Attends Gnosticist service: Not on file     Active member of club or organization: Not on file     Attends meetings of clubs or organizations: Not on file     Relationship status: Not on file   • Intimate partner violence:     Fear of current or ex partner: Not on file      "Emotionally abused: Not on file     Physically abused: Not on file     Forced sexual activity: Not on file   Other Topics Concern   • Not on file   Social History Narrative    Retired from [employer].  on 10/14/2019 after 72 years of marriage.     Allergies   Allergen Reactions   • Iodine      Outpatient Encounter Medications as of 12/3/2019   Medication Sig Dispense Refill   • DILTIAZem CD (CARDIZEM CD) 240 MG CAPSULE SR 24 HR Take 1 Cap by mouth every day. 90 Cap 3   • potassium chloride SA (KDUR) 20 MEQ Tab CR TAKE 1 TABLET BY MOUTH EVERY DAY 30 Tab 1   • furosemide (LASIX) 20 MG Tab TAKE 1 TABLET BY MOUTH EVERY DAY 30 Tab 1   • warfarin (COUMADIN) 5 MG Tab TAKE 1-1.5 TABLETS BY MOUTH OR AS DIRECTED BY COUMADIN CLINIC 135 Tab 1   • Cholecalciferol (VITAMIN D3) 5000 units Tab Take 5,000 Units by mouth every day at 6 PM.     • Magnesium 400 MG Tab Take 400 mg by mouth every day.     • [DISCONTINUED] DILTIAZem CD (CARDIZEM CD) 120 MG CAPSULE SR 24 HR Take 1 Cap by mouth every day. 30 Cap 3     No facility-administered encounter medications on file as of 12/3/2019.      Review of Systems   Constitutional: Negative for chills and fever.   HENT: Negative for sore throat.    Eyes: Negative for blurred vision.   Respiratory: Negative for cough and shortness of breath.    Cardiovascular: Negative for chest pain, palpitations, claudication, leg swelling and PND.   Gastrointestinal: Negative for abdominal pain and nausea.   Musculoskeletal: Negative for falls and joint pain.   Skin: Negative for rash.   Neurological: Negative for dizziness, focal weakness and weakness.   Endo/Heme/Allergies: Does not bruise/bleed easily.        Objective:   /64 (BP Location: Left arm, Patient Position: Sitting)   Pulse 74   Ht 1.676 m (5' 6\")   Wt 74.4 kg (164 lb)   SpO2 96%   BMI 26.47 kg/m²     Physical Exam   Constitutional: No distress.   HENT:   Mouth/Throat: Oropharynx is clear and moist. No oropharyngeal " exudate.   Eyes: No scleral icterus.   Neck: No JVD present.   Cardiovascular: Normal rate and normal heart sounds. Exam reveals no gallop and no friction rub.   No murmur heard.  Pulmonary/Chest: No respiratory distress. He has no wheezes. He has no rales.   Abdominal: Soft. Bowel sounds are normal.   Musculoskeletal:         General: Edema present.   Neurological: He is alert.   Skin: No rash noted. He is not diaphoretic.   Psychiatric: He has a normal mood and affect.     I personally reviewed in person with the patient his most recent pacemaker check it is functioning appropriately.    We reviewed the results from his hospitalization subsequent kidney function is improved back to baseline    Assessment:     1. Moderate aortic stenosis     2. Presence of permanent cardiac pacemaker     3. Paroxysmal atrial fibrillation (HCC)     4. Essential hypertension     5. Chronic anticoagulation         Medical Decision Making:  Today's Assessment / Status / Plan:     It was my pleasure to meet with Mr. Milan.    Given his age his A. fib is likely permanent    He understands the risks and benefits of chronic anticoagulation    He had been on diltiazem 240 for long-term he does have swelling of the legs left greater than right long-term and mild he will monitor his weight keep it under 166 pounds    I gave him clear guidance on when to take extra diuretics    I will see Mr. Milan back in 6 months time and encouraged him to follow up with us over the phone or electronically using my MyChart as issues arise.    It is my pleasure to participate in the care of Mr. Milan.  Please do not hesitate to contact me with questions or concerns.    Bentley Marshall MD PhD FAC  Cardiologist [facility]    Please note that this dictation was created using voice recognition software. I have worked with consultants from the vendor as well as technical experts from [facility] to optimize the  interface. I have made every reasonable attempt to correct obvious errors, but I expect that there are errors of grammar and possibly content I did not discover before finalizing the note.

## 2019-12-12 NOTE — TELEPHONE ENCOUNTER
Requested Prescriptions     Pending Prescriptions Disp Refills   • furosemide (LASIX) 20 MG Tab 30 Tab 0     Sig: Take 1 Tab by mouth every day.       Vicki Escobar A.P.R.N.

## 2019-12-16 NOTE — TELEPHONE ENCOUNTER
Was the patient seen in the last year in this department? Yes    Does patient have an active prescription for medications requested? Yes REQUESTING 90 DAY SUPPLY    Received Request Via: Pharmacy

## 2019-12-17 NOTE — TELEPHONE ENCOUNTER
Requested Prescriptions     Refused Prescriptions Disp Refills   • furosemide (LASIX) 20 MG Tab 90 Tab 0     Sig: Take 1 Tab by mouth every day.       BRITNEY Vaca.     Patient has a follow-up next week to review leg swelling and medication.

## 2019-12-24 PROBLEM — R60.0 BILATERAL LOWER EXTREMITY EDEMA: Status: ACTIVE | Noted: 2019-01-01

## 2019-12-24 PROBLEM — Z09 HOSPITAL DISCHARGE FOLLOW-UP: Status: RESOLVED | Noted: 2019-01-01 | Resolved: 2019-01-01

## 2019-12-24 NOTE — PATIENT INSTRUCTIONS
"Monitor yourself daily. Help your doctor manage your CHF buy bringing a log of weight, symptoms and activity level   Report  \"Yellow Light\" symptoms immediately.  Increased weight of three pound in one day or 5 pounds in one week.  More shortness of breath  More swelling in your feel and ankles  More fatigue, feeling faint or dizzy  "

## 2019-12-24 NOTE — ASSESSMENT & PLAN NOTE
Chronic problem for the patient that is ongoing.  Patient reports that he had a fall approximately 3 weeks ago without injury.  Patient's daughter is present and reports that he has been using a walker for stability since.  Declines referral to physical therapy.

## 2019-12-25 NOTE — ASSESSMENT & PLAN NOTE
This is a new problem to the examiner. Ongoing problem for the patient that has improved.  The patient was seen on 11/21/2019 for bilateral lower extremity edema, redness, and weeping.  The patient was in the hospital 6 weeks prior for pneumonia and experienced bilateral lower extremity edema and redness since discharge.  The patient was started on furosemide 20 mg/day and had improvement of edema and blistering.  The patient does continue to have edema and redness as well as itching.

## 2019-12-25 NOTE — ASSESSMENT & PLAN NOTE
This is a new problem to the examiner.  Ongoing problem for the patient that has improved as of last lab work on 11/21/2019.  The patient does continue to take furosemide 20 mg/day for bilateral lower extremity edema.   10/9/2019 03:23 10/10/2019 02:09 10/11/2019 02:25 11/21/2019 14:52   Bun 33 (H) 50 (H) 61 (H) 34 (H)   Creatinine 1.37 2.22 (H) 2.32 (H) 1.69 (H)   GFR If  58 (A) 33 (A) 32 (A) 46 (A)   GFR If Non  48 (A) 27 (A) 26 (A) 38 (A)

## 2019-12-25 NOTE — ASSESSMENT & PLAN NOTE
"Chronic, stable.    Currently taking diltiazem  mg/day and furosemide 20 mg/day as directed.   Reports diet is \"okay\".   He is not following a low-salt diet.  He is not exercising regularly.  He is not taking baby aspirin daily.   He is not monitoring BP at home.   Reports: BLE edema  Denies symptoms low BP: light-headed, tunnel-vision, unusual fatigue, dizziness.  Denies symptoms high BP: pounding headache, visual changes, palpitations, flushed face.   Denies chest pain, shortness of breath, dyspnea on exertion.   Denies medicine side effects: unusual fatigue, slow heartbeat, cough.  Vitals 10/28/2019 11/21/2019 12/3/2019 12/24/2019   SYSTOLIC 134 144 134 134   DIASTOLIC 72 74 64 70   PULSE 73 74 74 83     "

## 2019-12-25 NOTE — PROGRESS NOTES
"CC:   Chief Complaint   Patient presents with   • Itching     Left leg     HISTORY OF THE PRESENT ILLNESS: Patient is a 89 y.o. male. This pleasant patient is here today to follow up on BLE edema and itching.    Health Maintenance: Completed    Balance problem  Risk for falls  Chronic problem for the patient that is ongoing.  Patient reports that he had a fall approximately 3 weeks ago without injury.  Patient's daughter is present and reports that he has been using a walker for stability since.  Declines referral to physical therapy.    Bilateral lower extremity edema  This is a new problem to the examiner. Ongoing problem for the patient that has improved.  The patient was seen on 11/21/2019 for bilateral lower extremity edema, redness, and weeping.  The patient was in the hospital 6 weeks prior for pneumonia and experienced bilateral lower extremity edema and redness since discharge.  The patient was started on furosemide 20 mg/day and had improvement of edema and blistering.  The patient does continue to have edema and redness as well as itching.    Essential hypertension  Chronic, stable.    Currently taking diltiazem  mg/day and furosemide 20 mg/day as directed.   Reports diet is \"okay\".   He is not following a low-salt diet.  He is not exercising regularly.  He is not taking baby aspirin daily.   He is not monitoring BP at home.   Reports: BLE edema  Denies symptoms low BP: light-headed, tunnel-vision, unusual fatigue, dizziness.  Denies symptoms high BP: pounding headache, visual changes, palpitations, flushed face.   Denies chest pain, shortness of breath, dyspnea on exertion.   Denies medicine side effects: unusual fatigue, slow heartbeat, cough.  Vitals 10/28/2019 11/21/2019 12/3/2019 12/24/2019   SYSTOLIC 134 144 134 134   DIASTOLIC 72 74 64 70   PULSE 73 74 74 83     Stage 4 chronic kidney disease (HCC)  This is a new problem to the examiner.  Ongoing problem for the patient that has improved as " of last lab work on 11/21/2019.  The patient does continue to take furosemide 20 mg/day for bilateral lower extremity edema.   10/9/2019 03:23 10/10/2019 02:09 10/11/2019 02:25 11/21/2019 14:52   Bun 33 (H) 50 (H) 61 (H) 34 (H)   Creatinine 1.37 2.22 (H) 2.32 (H) 1.69 (H)   GFR If  58 (A) 33 (A) 32 (A) 46 (A)   GFR If Non  48 (A) 27 (A) 26 (A) 38 (A)     Allergies: Iodine    Current Outpatient Medications Ordered in Epic   Medication Sig Dispense Refill   • triamcinolone acetonide (KENALOG) 0.1 % Cream Apply 1 g to affected area(s) 2 times a day as needed. 1 Tube 11   • furosemide (LASIX) 20 MG Tab Take 1 Tab by mouth every day. May take an additional 20 mg in the day as directed by cardiology 180 Tab 1   • potassium chloride SA (KDUR) 20 MEQ Tab CR Take 1 Tab by mouth every day. 90 Tab 1   • DILTIAZem CD (CARDIZEM CD) 240 MG CAPSULE SR 24 HR Take 1 Cap by mouth every day. 90 Cap 3   • warfarin (COUMADIN) 5 MG Tab TAKE 1-1.5 TABLETS BY MOUTH OR AS DIRECTED BY COUMADIN CLINIC 135 Tab 1   • Cholecalciferol (VITAMIN D3) 5000 units Tab Take 5,000 Units by mouth every day at 6 PM.     • Magnesium 400 MG Tab Take 400 mg by mouth every day.       No current [facility]-ordered facility-administered medications on file.      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 Trace MR, moderate AS (peak 40mmHg, mean 23mmHg), no AI. Moderate TR. RVSP 45mmHg.   • Other dyspnea and respiratory  abnormality    • Other malignant lymphomas, unspecified site, extranodal and solid organ sites    • Paroxysmal atrial fibrillation (HCC)    • Permanent atrial fibrillation - since 5/2019    • Personal history of malignant neoplasm of prostate    • Personal history of venous thrombosis and embolism    • Pneumonia due to infectious organism 12/11/2018   • Presence of permanent cardiac pacemaker 09/2009    Medtronic Versa [id number] implanted by Dr. Lexa Lopes.    • Prostate cancer (HCC) 1991    Treated with surgery   • Renal disorder 1970    stones   • Scalp laceration 4/17/2019   • Sleep apnea     Cannot use CPAP machine.   • Stroke (HCC) 1991   • TIA (transient ischemic attack)    • Trauma 4/17/2019     Past Surgical History:   Procedure Laterality Date   • RECOVERY  5/19/2011    Performed by SURGERY, IR-RECOVERY at SURGERY SAME DAY [facility] ORS   • PACEMAKER INSERTION  September 2009    Medtronic Versa [id number] implanted by Dr. Lexa Lopes.   • EYE SURGERY  2006    left eye mass/ cancer/ s/p radiation   • LITHOTRIPSY  1995    left kidney x 3   • PROSTATECTOMY, RADICAL RETRO  1991   • EYE SURGERY       Social History     Tobacco Use   • Smoking status: Never Smoker   • Smokeless tobacco: Never Used   Substance Use Topics   • Alcohol use: No     Alcohol/week: 0.0 oz   • Drug use: No     Social History     Patient does not qualify to have social determinant information on file (likely too young).   Social History Narrative    Retired from [employer].  on 10/14/2019 after 72 years of marriage.     Family History   Problem Relation Age of Onset   • Heart Disease Mother    • Heart Attack Mother    • Heart Disease Father    • Heart Attack Father    • No Known Problems Maternal Grandmother    • No Known Problems Maternal Grandfather    • No Known Problems Paternal Grandmother    • No Known Problems Paternal Grandfather      ROS:   Constitutional:  Negative for fever, chills, unexpected weight change, night  "sweats, body aches, sleep issues, and fatigue/generalized weakness.   HEENT: Positive for bilateral hearing loss. Negative for headaches, vision changes, ear pain, tinnitus, ear discharge, rhinorrhea, sinus congestion, sneezing, sore throat, and neck pain.    Respiratory:  Negative for cough, shortness of breath, sputum production, hemoptysis, chest congestion, dyspnea, wheezing, and crackles.    Cardiovascular: Positive for BLE edema. Negative for chest pain, palpitations, SMITH, paroxsymal nocturnal dyspnea, orthopnea.   Gastrointestinal:  Negative for heartburn, nausea, vomiting, abdominal pain, hematochezia, melena, diarrhea, constipation, and greasy/foul-smelling stools.   Genitourinary: Positive for urinary incontinence. Negative for dysuria, nocturia, polyuria, hematuria, pyuria, urinary urgency, urinary frequency.   Musculoskeletal:  Negative for myalgias, back pain, and joint pain.   Skin: Positive for BLE redness and itching. Negative for rash, sores, lumps, cyanotic skin color change.   Neurological: Negative for dizziness, tingling, tremors, focal sensory deficit, focal weakness and headaches.   Endo/Heme/Allergies: Patient is on chronic anticoagulation with coumadin. Denies cold/heat intolerance.   Psychiatric/Behavioral: Negative for depression, suicidal/homicidal ideation and memory loss.        Exam: /70 (BP Location: Left arm, Patient Position: Sitting, BP Cuff Size: Adult)   Pulse 83   Temp 36.2 °C (97.1 °F) (Temporal)   Ht 1.676 m (5' 6\")   Wt 77.6 kg (171 lb)   SpO2 93%  Body mass index is 27.6 kg/m².    General:  Normal appearing. No distress.  HEENT: Pupils reactive to light, unequal Left 3 Right 2. Bilateral baseline lid ptosis. Normocephalic. Eyes conjunctiva clear, ears normal shape and contour, bilateral hearing aides in place, nasal mucosa benign, oropharynx is without erythema, edema or exudates.   Neck:  Supple without JVD or bruit.  Pulmonary:  Clear to ausculation.  Normal " effort. No rales, ronchi, or wheezing.  Cardiovascular: Murmur heard. BLE generalized edema 1+ pitting, no weeping.  Regular rate and rhythm. Carotid and radial pulses are intact and equal bilaterally.  Abdomen:  Soft, nontender, nondistended. Normal bowel sounds.  Neurologic:  Grossly nonfocal.  Skin: BLE hemosiderin deposition. Warm and dry.   Musculoskeletal: Unsteady gait. No extremity cyanosis, clubbing, or edema.  Psych:  Normal mood and affect. Alert and oriented x3. Judgment and insight is normal.    Assessment/Plan:  1. Bilateral lower extremity edema  Continue furosemide 20 mg/day.  Patient was seen by cardiology encouraged to take an additional 20 mg as needed, refill provided.  Continue potassium chloride SA 20 mEq's per day.  Encouraged lotion to bilateral lower extremities, may use Kenalog cream for itching.  Encouraged compression stockings and elevation.  Follow-up with cardiology.  - triamcinolone acetonide (KENALOG) 0.1 % Cream; Apply 1 g to affected area(s) 2 times a day as needed.  Dispense: 1 Tube; Refill: 11  - furosemide (LASIX) 20 MG Tab; Take 1 Tab by mouth every day. May take an additional 20 mg in the day as directed by cardiology  Dispense: 180 Tab; Refill: 1  - potassium chloride SA (KDUR) 20 MEQ Tab CR; Take 1 Tab by mouth every day.  Dispense: 90 Tab; Refill: 1    2. Essential hypertension  Continue diltiazem  mg/day and furosemide 20 mg/day.  Continue follow-up with cardiology.    3. Stage 4 chronic kidney disease (HCC)  Ongoing, stable.  Plan to repeat CMP in 6 months.    4. Balance problem  5. Risk for falls  Patient had a fall approximately 3 weeks ago.  Has been using front wheeled walker since.  Encourage continue use of walker with ambulation.  Declines referral to physical therapy.    Patient was seen for at least 25 minutes total face-to-face time with greater than 50% of that time spent on counseling and care coordination.    Educated in proper administration of  medication(s) ordered today including safety, possible SE, risks, benefits, rationale and alternatives to therapy.   Supportive care, differential diagnoses, and indications for immediate follow-up discussed with patient.    Pathogenesis of diagnosis discussed including typical length and natural progression.    Instructed to return to clinic or nearest emergency department for any change in condition, further concerns, or worsening of symptoms.  Patient states understanding of the plan of care and discharge instructions.    Return in about 6 months (around 6/24/2020) for Follow-up, As needed.    Please note that this dictation was created using voice recognition software. I have made every reasonable attempt to correct obvious errors, but I expect that there are errors of grammar and possibly content that I did not discover before finalizing the note.

## 2019-12-31 NOTE — PROGRESS NOTES
Anticoagulation Summary  As of 12/31/2019    INR goal:   2.0-3.0   TTR:   77.1 % (4.5 y)   INR used for dosing:   3.30! (12/31/2019)   Warfarin maintenance plan:   2.5 mg (5 mg x 0.5) every Tue, Thu, Sat; 5 mg (5 mg x 1) all other days   Weekly warfarin total:   27.5 mg   Plan last modified:   Marion GarrisonD (10/22/2019)   Next INR check:   1/14/2020   Target end date:   Indefinite    Indications    Chronic anticoagulation [Z79.01]  Paroxysmal atrial fibrillation (HCC) [I48.0]  History of TIA (transient ischemic attack) and stroke [Z86.73]             Anticoagulation Episode Summary     INR check location:   Clinic Lab    Preferred lab:       Send INR reminders to:       Comments:         Anticoagulation Care Providers     Provider Role Specialty Phone number    Taz Jc M.D. Referring Cardiology [phone]    Marion MackenzieD Responsible          Anticoagulation Patient Findings  Patient Findings     Negatives:   Signs/symptoms of thrombosis, Signs/symptoms of bleeding, Laboratory test error suspected, Change in health, Change in alcohol use, Change in activity, Upcoming invasive procedure, Emergency department visit, Upcoming dental procedure, Missed doses, Extra doses, Change in medications, Change in diet/appetite, Hospital admission, Bruising, Other complaints         HPI:   Primitivo Milan seen in clinic today, on anticoagulation therapy with warfarin for stroke prevention due to history of PAF and TIA.    Patient's previous INR was therapeutic at 3.0 on 11-26-19, at which time patient was instructed to continue with current warfarin regimen.  He returns to clinic today to recheck INR to ensure it is therapeutic and thus preventing possible clotting and/or bleeding/bruising complications.    CHADS-VASc = at least 5  (unadjusted ischemic stroke risk/year:  7.2%, which is high risk given hx of TIA)    Does patient have any changes to current medical/health status since last appt (Y/N):   NO  Does patient have any signs/symptoms of bleeding and/or thrombosis since the last appt (Y/N):  NO  Does patient have any interval changes to diet or medications since last appt (Y/N):  NO  Are there any complications or cost restrictions with current therapy (Y/N):  NO     Does patient have [facility] PCP? YES, Vicki SHIPLEY (If not, please document discussion that patient must be seen at [facility])       Vitals:  declined by patient at today's visit.    There were no vitals filed for this visit.     Asssessment:      INR supratherapeutic at 3.3, therefore increasing patient's risk of bleeding complications.   Reason(s) for out of range INR today:  Etiology unknown.      Plan:  Instructed patient to decrease tomorrows dose to 2.5mg (already took today's dose), then resume current warfarin regimen in order to bring INR back to therapeutic range.     Follow up:  Because warfarin is a high risk medication and current CHEST guidelines recommend regular monitoring intervals (few days up to 12 weeks), will have patient return to clinic in 2 weeks to recheck INR.    Sánchez Ochoa, PharmD, BCACP

## 2020-01-01 ENCOUNTER — APPOINTMENT (OUTPATIENT)
Dept: RADIOLOGY | Facility: MEDICAL CENTER | Age: 85
DRG: 602 | End: 2020-01-01
Attending: INTERNAL MEDICINE
Payer: MEDICARE

## 2020-01-01 ENCOUNTER — TELEPHONE (OUTPATIENT)
Dept: CARDIOLOGY | Facility: MEDICAL CENTER | Age: 85
End: 2020-01-01

## 2020-01-01 ENCOUNTER — TELEPHONE (OUTPATIENT)
Dept: MEDICAL GROUP | Facility: PHYSICIAN GROUP | Age: 85
End: 2020-01-01

## 2020-01-01 ENCOUNTER — ANTICOAGULATION VISIT (OUTPATIENT)
Dept: MEDICAL GROUP | Facility: PHYSICIAN GROUP | Age: 85
End: 2020-01-01
Payer: MEDICARE

## 2020-01-01 ENCOUNTER — PATIENT MESSAGE (OUTPATIENT)
Dept: CARDIOLOGY | Facility: MEDICAL CENTER | Age: 85
End: 2020-01-01

## 2020-01-01 ENCOUNTER — OFFICE VISIT (OUTPATIENT)
Dept: URGENT CARE | Facility: PHYSICIAN GROUP | Age: 85
End: 2020-01-01
Payer: MEDICARE

## 2020-01-01 ENCOUNTER — OFFICE VISIT (OUTPATIENT)
Dept: CARDIOLOGY | Facility: MEDICAL CENTER | Age: 85
End: 2020-01-01
Payer: MEDICARE

## 2020-01-01 ENCOUNTER — APPOINTMENT (OUTPATIENT)
Dept: RADIOLOGY | Facility: MEDICAL CENTER | Age: 85
DRG: 602 | End: 2020-01-01
Attending: EMERGENCY MEDICINE
Payer: MEDICARE

## 2020-01-01 ENCOUNTER — OFFICE VISIT (OUTPATIENT)
Dept: MEDICAL GROUP | Facility: PHYSICIAN GROUP | Age: 85
End: 2020-01-01
Payer: MEDICARE

## 2020-01-01 ENCOUNTER — HOSPITAL ENCOUNTER (OUTPATIENT)
Dept: LAB | Facility: MEDICAL CENTER | Age: 85
End: 2020-01-21
Attending: NURSE PRACTITIONER
Payer: MEDICARE

## 2020-01-01 ENCOUNTER — HOSPITAL ENCOUNTER (OUTPATIENT)
Facility: MEDICAL CENTER | Age: 85
End: 2020-01-23
Attending: NURSE PRACTITIONER
Payer: MEDICARE

## 2020-01-01 ENCOUNTER — TELEPHONE (OUTPATIENT)
Dept: VASCULAR LAB | Facility: MEDICAL CENTER | Age: 85
End: 2020-01-01

## 2020-01-01 ENCOUNTER — HOSPITAL ENCOUNTER (OUTPATIENT)
Dept: CARDIOLOGY | Facility: MEDICAL CENTER | Age: 85
End: 2020-01-27
Attending: PHYSICIAN ASSISTANT
Payer: MEDICARE

## 2020-01-01 ENCOUNTER — APPOINTMENT (OUTPATIENT)
Dept: WOUND CARE | Facility: MEDICAL CENTER | Age: 85
End: 2020-01-01
Attending: NURSE PRACTITIONER
Payer: MEDICARE

## 2020-01-01 ENCOUNTER — HOSPITAL ENCOUNTER (INPATIENT)
Facility: MEDICAL CENTER | Age: 85
LOS: 4 days | DRG: 602 | End: 2020-02-01
Attending: EMERGENCY MEDICINE | Admitting: HOSPITALIST
Payer: MEDICARE

## 2020-01-01 VITALS
OXYGEN SATURATION: 92 % | BODY MASS INDEX: 26.71 KG/M2 | HEART RATE: 61 BPM | RESPIRATION RATE: 16 BRPM | SYSTOLIC BLOOD PRESSURE: 130 MMHG | DIASTOLIC BLOOD PRESSURE: 66 MMHG | HEIGHT: 66 IN | WEIGHT: 166.23 LBS | TEMPERATURE: 96.9 F

## 2020-01-01 VITALS
HEART RATE: 73 BPM | SYSTOLIC BLOOD PRESSURE: 122 MMHG | BODY MASS INDEX: 27.48 KG/M2 | TEMPERATURE: 97.6 F | DIASTOLIC BLOOD PRESSURE: 70 MMHG | OXYGEN SATURATION: 90 % | WEIGHT: 171 LBS | HEIGHT: 66 IN

## 2020-01-01 VITALS
TEMPERATURE: 98 F | HEIGHT: 66 IN | BODY MASS INDEX: 27.48 KG/M2 | DIASTOLIC BLOOD PRESSURE: 78 MMHG | HEART RATE: 83 BPM | RESPIRATION RATE: 20 BRPM | SYSTOLIC BLOOD PRESSURE: 122 MMHG | WEIGHT: 171 LBS | OXYGEN SATURATION: 93 %

## 2020-01-01 VITALS
HEART RATE: 74 BPM | OXYGEN SATURATION: 90 % | HEIGHT: 66 IN | SYSTOLIC BLOOD PRESSURE: 148 MMHG | WEIGHT: 171 LBS | BODY MASS INDEX: 27.48 KG/M2 | DIASTOLIC BLOOD PRESSURE: 84 MMHG | TEMPERATURE: 98.1 F

## 2020-01-01 VITALS
HEIGHT: 66 IN | HEART RATE: 88 BPM | BODY MASS INDEX: 27.49 KG/M2 | OXYGEN SATURATION: 90 % | SYSTOLIC BLOOD PRESSURE: 138 MMHG | WEIGHT: 171.08 LBS | DIASTOLIC BLOOD PRESSURE: 70 MMHG

## 2020-01-01 VITALS
HEART RATE: 64 BPM | HEIGHT: 66 IN | OXYGEN SATURATION: 94 % | WEIGHT: 168 LBS | DIASTOLIC BLOOD PRESSURE: 70 MMHG | SYSTOLIC BLOOD PRESSURE: 154 MMHG | BODY MASS INDEX: 27 KG/M2

## 2020-01-01 DIAGNOSIS — Z86.73 HISTORY OF TIA (TRANSIENT ISCHEMIC ATTACK) AND STROKE: ICD-10-CM

## 2020-01-01 DIAGNOSIS — I10 ESSENTIAL HYPERTENSION: ICD-10-CM

## 2020-01-01 DIAGNOSIS — R60.0 LOWER EXTREMITY EDEMA: ICD-10-CM

## 2020-01-01 DIAGNOSIS — I35.0 MODERATE AORTIC STENOSIS: ICD-10-CM

## 2020-01-01 DIAGNOSIS — Z79.01 CHRONIC ANTICOAGULATION: ICD-10-CM

## 2020-01-01 DIAGNOSIS — N18.4 CKD (CHRONIC KIDNEY DISEASE) STAGE 4, GFR 15-29 ML/MIN (HCC): ICD-10-CM

## 2020-01-01 DIAGNOSIS — R60.0 BILATERAL LOWER EXTREMITY EDEMA: ICD-10-CM

## 2020-01-01 DIAGNOSIS — N18.4 STAGE 4 CHRONIC KIDNEY DISEASE (HCC): ICD-10-CM

## 2020-01-01 DIAGNOSIS — Z95.0 PRESENCE OF PERMANENT CARDIAC PACEMAKER: ICD-10-CM

## 2020-01-01 DIAGNOSIS — Z79.01 CHRONIC ANTICOAGULATION: Primary | ICD-10-CM

## 2020-01-01 DIAGNOSIS — L03.119 CELLULITIS OF LOWER EXTREMITY, UNSPECIFIED LATERALITY: ICD-10-CM

## 2020-01-01 DIAGNOSIS — I48.0 PAROXYSMAL ATRIAL FIBRILLATION (HCC): ICD-10-CM

## 2020-01-01 DIAGNOSIS — L03.114 CELLULITIS OF LEFT UPPER EXTREMITY: ICD-10-CM

## 2020-01-01 DIAGNOSIS — I50.9 ACUTE CONGESTIVE HEART FAILURE, UNSPECIFIED HEART FAILURE TYPE (HCC): ICD-10-CM

## 2020-01-01 DIAGNOSIS — I35.0 SEVERE AORTIC STENOSIS: ICD-10-CM

## 2020-01-01 LAB
ALBUMIN SERPL BCP-MCNC: 3.8 G/DL (ref 3.2–4.9)
ALBUMIN/GLOB SERPL: 1.2 G/DL
ALP SERPL-CCNC: 89 U/L (ref 30–99)
ALT SERPL-CCNC: 17 U/L (ref 2–50)
ANION GAP SERPL CALC-SCNC: 10 MMOL/L (ref 0–11.9)
ANION GAP SERPL CALC-SCNC: 10 MMOL/L (ref 0–11.9)
ANION GAP SERPL CALC-SCNC: 11 MMOL/L (ref 0–11.9)
ANION GAP SERPL CALC-SCNC: 12 MMOL/L (ref 0–11.9)
ANION GAP SERPL CALC-SCNC: 13 MMOL/L (ref 0–11.9)
ANION GAP SERPL CALC-SCNC: 9 MMOL/L (ref 0–11.9)
APPEARANCE UR: CLEAR
AST SERPL-CCNC: 44 U/L (ref 12–45)
BACTERIA BLD CULT: NORMAL
BACTERIA BLD CULT: NORMAL
BACTERIA SPEC ANAEROBE CULT: NORMAL
BACTERIA UR CULT: NORMAL
BACTERIA WND AEROBE CULT: ABNORMAL
BACTERIA WND AEROBE CULT: ABNORMAL
BASOPHILS # BLD AUTO: 0.2 % (ref 0–1.8)
BASOPHILS # BLD AUTO: 0.7 % (ref 0–1.8)
BASOPHILS # BLD: 0.02 K/UL (ref 0–0.12)
BASOPHILS # BLD: 0.05 K/UL (ref 0–0.12)
BILIRUB SERPL-MCNC: 1.4 MG/DL (ref 0.1–1.5)
BILIRUB UR QL STRIP.AUTO: NEGATIVE
BUN SERPL-MCNC: 44 MG/DL (ref 8–22)
BUN SERPL-MCNC: 46 MG/DL (ref 8–22)
BUN SERPL-MCNC: 46 MG/DL (ref 8–22)
BUN SERPL-MCNC: 52 MG/DL (ref 8–22)
BUN SERPL-MCNC: 52 MG/DL (ref 8–22)
BUN SERPL-MCNC: 55 MG/DL (ref 8–22)
CALCIUM SERPL-MCNC: 8.9 MG/DL (ref 8.5–10.5)
CALCIUM SERPL-MCNC: 9.4 MG/DL (ref 8.5–10.5)
CALCIUM SERPL-MCNC: 9.6 MG/DL (ref 8.5–10.5)
CALCIUM SERPL-MCNC: 9.8 MG/DL (ref 8.5–10.5)
CALCIUM SERPL-MCNC: 9.8 MG/DL (ref 8.5–10.5)
CALCIUM SERPL-MCNC: 9.9 MG/DL (ref 8.5–10.5)
CHLORIDE SERPL-SCNC: 101 MMOL/L (ref 96–112)
CHLORIDE SERPL-SCNC: 102 MMOL/L (ref 96–112)
CHLORIDE SERPL-SCNC: 103 MMOL/L (ref 96–112)
CHLORIDE SERPL-SCNC: 104 MMOL/L (ref 96–112)
CHLORIDE SERPL-SCNC: 104 MMOL/L (ref 96–112)
CHLORIDE SERPL-SCNC: 107 MMOL/L (ref 96–112)
CO2 SERPL-SCNC: 24 MMOL/L (ref 20–33)
CO2 SERPL-SCNC: 25 MMOL/L (ref 20–33)
CO2 SERPL-SCNC: 26 MMOL/L (ref 20–33)
CO2 SERPL-SCNC: 27 MMOL/L (ref 20–33)
CO2 SERPL-SCNC: 29 MMOL/L (ref 20–33)
CO2 SERPL-SCNC: 30 MMOL/L (ref 20–33)
COLOR UR: YELLOW
CREAT SERPL-MCNC: 2 MG/DL (ref 0.5–1.4)
CREAT SERPL-MCNC: 2.05 MG/DL (ref 0.5–1.4)
CREAT SERPL-MCNC: 2.06 MG/DL (ref 0.5–1.4)
CREAT SERPL-MCNC: 2.1 MG/DL (ref 0.5–1.4)
CREAT SERPL-MCNC: 2.2 MG/DL (ref 0.5–1.4)
CREAT SERPL-MCNC: 2.29 MG/DL (ref 0.5–1.4)
EKG IMPRESSION: NORMAL
EOSINOPHIL # BLD AUTO: 0 K/UL (ref 0–0.51)
EOSINOPHIL # BLD AUTO: 0.03 K/UL (ref 0–0.51)
EOSINOPHIL NFR BLD: 0 % (ref 0–6.9)
EOSINOPHIL NFR BLD: 0.4 % (ref 0–6.9)
ERYTHROCYTE [DISTWIDTH] IN BLOOD BY AUTOMATED COUNT: 51.6 FL (ref 35.9–50)
ERYTHROCYTE [DISTWIDTH] IN BLOOD BY AUTOMATED COUNT: 52 FL (ref 35.9–50)
GLOBULIN SER CALC-MCNC: 3.3 G/DL (ref 1.9–3.5)
GLUCOSE SERPL-MCNC: 105 MG/DL (ref 65–99)
GLUCOSE SERPL-MCNC: 123 MG/DL (ref 65–99)
GLUCOSE SERPL-MCNC: 131 MG/DL (ref 65–99)
GLUCOSE SERPL-MCNC: 154 MG/DL (ref 65–99)
GLUCOSE SERPL-MCNC: 87 MG/DL (ref 65–99)
GLUCOSE SERPL-MCNC: 95 MG/DL (ref 65–99)
GLUCOSE UR STRIP.AUTO-MCNC: NEGATIVE MG/DL
GRAM STN SPEC: ABNORMAL
GRAM STN SPEC: NORMAL
HCT VFR BLD AUTO: 43 % (ref 42–52)
HCT VFR BLD AUTO: 47.8 % (ref 42–52)
HGB BLD-MCNC: 14.3 G/DL (ref 14–18)
HGB BLD-MCNC: 15.5 G/DL (ref 14–18)
IMM GRANULOCYTES # BLD AUTO: 0.02 K/UL (ref 0–0.11)
IMM GRANULOCYTES # BLD AUTO: 0.03 K/UL (ref 0–0.11)
IMM GRANULOCYTES NFR BLD AUTO: 0.3 % (ref 0–0.9)
IMM GRANULOCYTES NFR BLD AUTO: 0.3 % (ref 0–0.9)
INR PPP: 3.1 (ref 2–3.5)
INR PPP: 3.25 (ref 0.87–1.13)
INR PPP: 4.47 (ref 0.87–1.13)
INR PPP: 4.65 (ref 0.87–1.13)
INR PPP: 5 (ref 2–3.5)
INR PPP: 5.77 (ref 0.87–1.13)
INR PPP: 6.44 (ref 0.87–1.13)
KETONES UR STRIP.AUTO-MCNC: NEGATIVE MG/DL
LACTATE BLD-SCNC: 1.8 MMOL/L (ref 0.5–2)
LEUKOCYTE ESTERASE UR QL STRIP.AUTO: NEGATIVE
LV EJECT FRACT  99904: 75
LV EJECT FRACT MOD 2C 99903: 79.59
LV EJECT FRACT MOD 4C 99902: 80.11
LV EJECT FRACT MOD BP 99901: 79.13
LYMPHOCYTES # BLD AUTO: 0.67 K/UL (ref 1–4.8)
LYMPHOCYTES # BLD AUTO: 0.74 K/UL (ref 1–4.8)
LYMPHOCYTES NFR BLD: 7.2 % (ref 22–41)
LYMPHOCYTES NFR BLD: 8.9 % (ref 22–41)
MAGNESIUM SERPL-MCNC: 2.2 MG/DL (ref 1.5–2.5)
MAGNESIUM SERPL-MCNC: 2.4 MG/DL (ref 1.5–2.5)
MCH RBC QN AUTO: 31.9 PG (ref 27–33)
MCH RBC QN AUTO: 32.5 PG (ref 27–33)
MCHC RBC AUTO-ENTMCNC: 32.4 G/DL (ref 33.7–35.3)
MCHC RBC AUTO-ENTMCNC: 33.3 G/DL (ref 33.7–35.3)
MCV RBC AUTO: 97.7 FL (ref 81.4–97.8)
MCV RBC AUTO: 98.4 FL (ref 81.4–97.8)
MICRO URNS: NORMAL
MONOCYTES # BLD AUTO: 0.78 K/UL (ref 0–0.85)
MONOCYTES # BLD AUTO: 1.13 K/UL (ref 0–0.85)
MONOCYTES NFR BLD AUTO: 10.4 % (ref 0–13.4)
MONOCYTES NFR BLD AUTO: 11 % (ref 0–13.4)
NEUTROPHILS # BLD AUTO: 5.97 K/UL (ref 1.82–7.42)
NEUTROPHILS # BLD AUTO: 8.32 K/UL (ref 1.82–7.42)
NEUTROPHILS NFR BLD: 79.3 % (ref 44–72)
NEUTROPHILS NFR BLD: 81.3 % (ref 44–72)
NITRITE UR QL STRIP.AUTO: NEGATIVE
NRBC # BLD AUTO: 0 K/UL
NRBC # BLD AUTO: 0 K/UL
NRBC BLD-RTO: 0 /100 WBC
NRBC BLD-RTO: 0 /100 WBC
NT-PROBNP SERPL IA-MCNC: 1420 PG/ML (ref 0–125)
NT-PROBNP SERPL IA-MCNC: 1750 PG/ML (ref 0–125)
PH UR STRIP.AUTO: 5 [PH] (ref 5–8)
PLATELET # BLD AUTO: 163 K/UL (ref 164–446)
PLATELET # BLD AUTO: 192 K/UL (ref 164–446)
PMV BLD AUTO: 10.2 FL (ref 9–12.9)
PMV BLD AUTO: 10.4 FL (ref 9–12.9)
POTASSIUM SERPL-SCNC: 3.8 MMOL/L (ref 3.6–5.5)
POTASSIUM SERPL-SCNC: 3.9 MMOL/L (ref 3.6–5.5)
POTASSIUM SERPL-SCNC: 4.2 MMOL/L (ref 3.6–5.5)
POTASSIUM SERPL-SCNC: 4.4 MMOL/L (ref 3.6–5.5)
PROT SERPL-MCNC: 7.1 G/DL (ref 6–8.2)
PROT UR QL STRIP: NEGATIVE MG/DL
PROTHROMBIN TIME: 34.4 SEC (ref 12–14.6)
PROTHROMBIN TIME: 44.4 SEC (ref 12–14.6)
PROTHROMBIN TIME: 45.8 SEC (ref 12–14.6)
PROTHROMBIN TIME: 54.5 SEC (ref 12–14.6)
PROTHROMBIN TIME: 59.5 SEC (ref 12–14.6)
RBC # BLD AUTO: 4.4 M/UL (ref 4.7–6.1)
RBC # BLD AUTO: 4.86 M/UL (ref 4.7–6.1)
RBC UR QL AUTO: NEGATIVE
SIGNIFICANT IND 70042: ABNORMAL
SIGNIFICANT IND 70042: NORMAL
SITE SITE: ABNORMAL
SITE SITE: NORMAL
SODIUM SERPL-SCNC: 137 MMOL/L (ref 135–145)
SODIUM SERPL-SCNC: 140 MMOL/L (ref 135–145)
SODIUM SERPL-SCNC: 142 MMOL/L (ref 135–145)
SODIUM SERPL-SCNC: 144 MMOL/L (ref 135–145)
SOURCE SOURCE: ABNORMAL
SOURCE SOURCE: NORMAL
SP GR UR STRIP.AUTO: 1.01
UROBILINOGEN UR STRIP.AUTO-MCNC: 0.2 MG/DL
WBC # BLD AUTO: 10.2 K/UL (ref 4.8–10.8)
WBC # BLD AUTO: 7.5 K/UL (ref 4.8–10.8)

## 2020-01-01 PROCEDURE — A9270 NON-COVERED ITEM OR SERVICE: HCPCS | Mod: TB | Performed by: INTERNAL MEDICINE

## 2020-01-01 PROCEDURE — 71045 X-RAY EXAM CHEST 1 VIEW: CPT

## 2020-01-01 PROCEDURE — 51798 US URINE CAPACITY MEASURE: CPT

## 2020-01-01 PROCEDURE — 99204 OFFICE O/P NEW MOD 45 MIN: CPT | Performed by: INTERNAL MEDICINE

## 2020-01-01 PROCEDURE — 700111 HCHG RX REV CODE 636 W/ 250 OVERRIDE (IP): Performed by: EMERGENCY MEDICINE

## 2020-01-01 PROCEDURE — 85610 PROTHROMBIN TIME: CPT | Performed by: FAMILY MEDICINE

## 2020-01-01 PROCEDURE — 93971 EXTREMITY STUDY: CPT | Mod: 26,LT | Performed by: INTERNAL MEDICINE

## 2020-01-01 PROCEDURE — 99214 OFFICE O/P EST MOD 30 MIN: CPT | Performed by: PHYSICIAN ASSISTANT

## 2020-01-01 PROCEDURE — A9270 NON-COVERED ITEM OR SERVICE: HCPCS | Performed by: HOSPITALIST

## 2020-01-01 PROCEDURE — 87077 CULTURE AEROBIC IDENTIFY: CPT

## 2020-01-01 PROCEDURE — 96365 THER/PROPH/DIAG IV INF INIT: CPT

## 2020-01-01 PROCEDURE — 700101 HCHG RX REV CODE 250

## 2020-01-01 PROCEDURE — 99214 OFFICE O/P EST MOD 30 MIN: CPT | Performed by: NURSE PRACTITIONER

## 2020-01-01 PROCEDURE — 93306 TTE W/DOPPLER COMPLETE: CPT

## 2020-01-01 PROCEDURE — 700102 HCHG RX REV CODE 250 W/ 637 OVERRIDE(OP): Mod: TB | Performed by: INTERNAL MEDICINE

## 2020-01-01 PROCEDURE — 700105 HCHG RX REV CODE 258: Performed by: HOSPITALIST

## 2020-01-01 PROCEDURE — 770020 HCHG ROOM/CARE - TELE (206)

## 2020-01-01 PROCEDURE — 700102 HCHG RX REV CODE 250 W/ 637 OVERRIDE(OP): Performed by: HOSPITALIST

## 2020-01-01 PROCEDURE — 700111 HCHG RX REV CODE 636 W/ 250 OVERRIDE (IP): Performed by: HOSPITALIST

## 2020-01-01 PROCEDURE — 36415 COLL VENOUS BLD VENIPUNCTURE: CPT

## 2020-01-01 PROCEDURE — 80048 BASIC METABOLIC PNL TOTAL CA: CPT

## 2020-01-01 PROCEDURE — 93005 ELECTROCARDIOGRAM TRACING: CPT

## 2020-01-01 PROCEDURE — 83880 ASSAY OF NATRIURETIC PEPTIDE: CPT

## 2020-01-01 PROCEDURE — 94760 N-INVAS EAR/PLS OXIMETRY 1: CPT

## 2020-01-01 PROCEDURE — 85610 PROTHROMBIN TIME: CPT

## 2020-01-01 PROCEDURE — 93306 TTE W/DOPPLER COMPLETE: CPT | Mod: 26 | Performed by: INTERNAL MEDICINE

## 2020-01-01 PROCEDURE — 99223 1ST HOSP IP/OBS HIGH 75: CPT | Performed by: INTERNAL MEDICINE

## 2020-01-01 PROCEDURE — 99239 HOSP IP/OBS DSCHRG MGMT >30: CPT | Performed by: INTERNAL MEDICINE

## 2020-01-01 PROCEDURE — 83735 ASSAY OF MAGNESIUM: CPT

## 2020-01-01 PROCEDURE — 80053 COMPREHEN METABOLIC PANEL: CPT

## 2020-01-01 PROCEDURE — 99211 OFF/OP EST MAY X REQ PHY/QHP: CPT | Performed by: FAMILY MEDICINE

## 2020-01-01 PROCEDURE — 700111 HCHG RX REV CODE 636 W/ 250 OVERRIDE (IP): Performed by: INTERNAL MEDICINE

## 2020-01-01 PROCEDURE — 87040 BLOOD CULTURE FOR BACTERIA: CPT

## 2020-01-01 PROCEDURE — 700105 HCHG RX REV CODE 258: Performed by: EMERGENCY MEDICINE

## 2020-01-01 PROCEDURE — 99223 1ST HOSP IP/OBS HIGH 75: CPT | Performed by: HOSPITALIST

## 2020-01-01 PROCEDURE — 87075 CULTR BACTERIA EXCEPT BLOOD: CPT

## 2020-01-01 PROCEDURE — 87070 CULTURE OTHR SPECIMN AEROBIC: CPT

## 2020-01-01 PROCEDURE — 70450 CT HEAD/BRAIN W/O DYE: CPT

## 2020-01-01 PROCEDURE — 99285 EMERGENCY DEPT VISIT HI MDM: CPT

## 2020-01-01 PROCEDURE — 87186 SC STD MICRODIL/AGAR DIL: CPT

## 2020-01-01 PROCEDURE — 304561 HCHG STAT O2

## 2020-01-01 PROCEDURE — 81003 URINALYSIS AUTO W/O SCOPE: CPT

## 2020-01-01 PROCEDURE — 93005 ELECTROCARDIOGRAM TRACING: CPT | Performed by: EMERGENCY MEDICINE

## 2020-01-01 PROCEDURE — 99233 SBSQ HOSP IP/OBS HIGH 50: CPT | Performed by: INTERNAL MEDICINE

## 2020-01-01 PROCEDURE — 87086 URINE CULTURE/COLONY COUNT: CPT

## 2020-01-01 PROCEDURE — 85025 COMPLETE CBC W/AUTO DIFF WBC: CPT

## 2020-01-01 PROCEDURE — 99215 OFFICE O/P EST HI 40 MIN: CPT | Performed by: NURSE PRACTITIONER

## 2020-01-01 PROCEDURE — 87205 SMEAR GRAM STAIN: CPT

## 2020-01-01 PROCEDURE — 93971 EXTREMITY STUDY: CPT | Mod: LT

## 2020-01-01 PROCEDURE — 94640 AIRWAY INHALATION TREATMENT: CPT

## 2020-01-01 PROCEDURE — 700105 HCHG RX REV CODE 258

## 2020-01-01 PROCEDURE — 92610 EVALUATE SWALLOWING FUNCTION: CPT

## 2020-01-01 PROCEDURE — 83605 ASSAY OF LACTIC ACID: CPT

## 2020-01-01 PROCEDURE — 96375 TX/PRO/DX INJ NEW DRUG ADDON: CPT

## 2020-01-01 RX ORDER — LINEZOLID 2 MG/ML
600 INJECTION, SOLUTION INTRAVENOUS EVERY 12 HOURS
Status: DISCONTINUED | OUTPATIENT
Start: 2020-01-01 | End: 2020-01-01

## 2020-01-01 RX ORDER — RISPERIDONE 0.5 MG/1
0.5 TABLET ORAL EVERY EVENING
Status: DISCONTINUED | OUTPATIENT
Start: 2020-01-01 | End: 2020-01-01 | Stop reason: HOSPADM

## 2020-01-01 RX ORDER — CEPHALEXIN 500 MG/1
500 CAPSULE ORAL 3 TIMES DAILY
Qty: 21 CAP | Refills: 0 | Status: SHIPPED | OUTPATIENT
Start: 2020-01-01 | End: 2020-01-01 | Stop reason: SDUPTHER

## 2020-01-01 RX ORDER — LINEZOLID 600 MG/1
600 TABLET, FILM COATED ORAL EVERY 12 HOURS
Status: DISCONTINUED | OUTPATIENT
Start: 2020-01-01 | End: 2020-01-01

## 2020-01-01 RX ORDER — SULFAMETHOXAZOLE AND TRIMETHOPRIM 800; 160 MG/1; MG/1
1 TABLET ORAL 2 TIMES DAILY
Qty: 20 TAB | Refills: 0 | Status: SHIPPED | OUTPATIENT
Start: 2020-01-01 | End: 2020-01-01

## 2020-01-01 RX ORDER — SODIUM CHLORIDE 9 MG/ML
INJECTION, SOLUTION INTRAVENOUS
Status: COMPLETED
Start: 2020-01-01 | End: 2020-01-01

## 2020-01-01 RX ORDER — POLYETHYLENE GLYCOL 3350 17 G/17G
1 POWDER, FOR SOLUTION ORAL
Status: DISCONTINUED | OUTPATIENT
Start: 2020-01-01 | End: 2020-01-01 | Stop reason: HOSPADM

## 2020-01-01 RX ORDER — CEFAZOLIN SODIUM 1 G/50ML
1 INJECTION, SOLUTION INTRAVENOUS EVERY 12 HOURS
Status: DISCONTINUED | OUTPATIENT
Start: 2020-01-01 | End: 2020-01-01 | Stop reason: HOSPADM

## 2020-01-01 RX ORDER — CEPHALEXIN 500 MG/1
500 CAPSULE ORAL 3 TIMES DAILY
Status: ON HOLD | COMMUNITY
Start: 2020-01-01 | End: 2020-01-01

## 2020-01-01 RX ORDER — CEPHALEXIN 500 MG/1
500 CAPSULE ORAL 3 TIMES DAILY
Qty: 21 CAP | Refills: 0 | Status: SHIPPED | OUTPATIENT
Start: 2020-01-01 | End: 2020-01-01

## 2020-01-01 RX ORDER — LORAZEPAM 2 MG/ML
1 INJECTION INTRAMUSCULAR ONCE
Status: COMPLETED | OUTPATIENT
Start: 2020-01-01 | End: 2020-01-01

## 2020-01-01 RX ORDER — FUROSEMIDE 10 MG/ML
40 INJECTION INTRAMUSCULAR; INTRAVENOUS
Status: DISCONTINUED | OUTPATIENT
Start: 2020-01-01 | End: 2020-01-01 | Stop reason: HOSPADM

## 2020-01-01 RX ORDER — POTASSIUM CHLORIDE 20 MEQ/1
20 TABLET, EXTENDED RELEASE ORAL
Qty: 90 TAB | Refills: 1 | Status: SHIPPED | OUTPATIENT
Start: 2020-01-01

## 2020-01-01 RX ORDER — HALOPERIDOL 5 MG/ML
5 INJECTION INTRAMUSCULAR
Status: COMPLETED | OUTPATIENT
Start: 2020-01-01 | End: 2020-01-01

## 2020-01-01 RX ORDER — FUROSEMIDE 20 MG/1
40 TABLET ORAL DAILY
Qty: 60 TAB | Refills: 11 | COMMUNITY

## 2020-01-01 RX ORDER — GUAIFENESIN 600 MG/1
600 TABLET, EXTENDED RELEASE ORAL EVERY 12 HOURS
Status: DISCONTINUED | OUTPATIENT
Start: 2020-01-01 | End: 2020-01-01

## 2020-01-01 RX ORDER — IPRATROPIUM BROMIDE AND ALBUTEROL SULFATE 2.5; .5 MG/3ML; MG/3ML
3 SOLUTION RESPIRATORY (INHALATION)
Status: DISCONTINUED | OUTPATIENT
Start: 2020-01-01 | End: 2020-01-01 | Stop reason: HOSPADM

## 2020-01-01 RX ORDER — AMOXICILLIN 250 MG
2 CAPSULE ORAL 2 TIMES DAILY
Status: DISCONTINUED | OUTPATIENT
Start: 2020-01-01 | End: 2020-01-01 | Stop reason: HOSPADM

## 2020-01-01 RX ORDER — AMOXICILLIN AND CLAVULANATE POTASSIUM 250; 125 MG/1; MG/1
1 TABLET, FILM COATED ORAL EVERY 12 HOURS
Status: DISCONTINUED | OUTPATIENT
Start: 2020-01-01 | End: 2020-01-01 | Stop reason: HOSPADM

## 2020-01-01 RX ORDER — IPRATROPIUM BROMIDE AND ALBUTEROL SULFATE 2.5; .5 MG/3ML; MG/3ML
SOLUTION RESPIRATORY (INHALATION)
Status: COMPLETED
Start: 2020-01-01 | End: 2020-01-01

## 2020-01-01 RX ORDER — CEFAZOLIN SODIUM 2 G/100ML
2 INJECTION, SOLUTION INTRAVENOUS EVERY 8 HOURS
Status: DISCONTINUED | OUTPATIENT
Start: 2020-01-01 | End: 2020-01-01

## 2020-01-01 RX ORDER — AMOXICILLIN AND CLAVULANATE POTASSIUM 250; 125 MG/1; MG/1
1 TABLET, FILM COATED ORAL EVERY 12 HOURS
Qty: 6 TAB | Refills: 0 | Status: SHIPPED | OUTPATIENT
Start: 2020-01-01 | End: 2020-02-04

## 2020-01-01 RX ORDER — PETROLATUM 42 G/100G
OINTMENT TOPICAL DAILY
Status: DISCONTINUED | OUTPATIENT
Start: 2020-01-01 | End: 2020-01-01 | Stop reason: HOSPADM

## 2020-01-01 RX ORDER — BISACODYL 10 MG
10 SUPPOSITORY, RECTAL RECTAL
Status: DISCONTINUED | OUTPATIENT
Start: 2020-01-01 | End: 2020-01-01 | Stop reason: HOSPADM

## 2020-01-01 RX ORDER — DILTIAZEM HYDROCHLORIDE 120 MG/1
240 CAPSULE, COATED, EXTENDED RELEASE ORAL DAILY
Status: DISCONTINUED | OUTPATIENT
Start: 2020-01-01 | End: 2020-01-01

## 2020-01-01 RX ORDER — POTASSIUM CHLORIDE 20 MEQ/1
20 TABLET, EXTENDED RELEASE ORAL DAILY
Status: DISCONTINUED | OUTPATIENT
Start: 2020-01-01 | End: 2020-01-01

## 2020-01-01 RX ORDER — FUROSEMIDE 20 MG/1
20 TABLET ORAL
Status: DISCONTINUED | OUTPATIENT
Start: 2020-01-01 | End: 2020-01-01 | Stop reason: HOSPADM

## 2020-01-01 RX ORDER — WARFARIN SODIUM 5 MG/1
2.5-5 TABLET ORAL DAILY
Status: ON HOLD | COMMUNITY
End: 2020-01-01

## 2020-01-01 RX ADMIN — CEFAZOLIN SODIUM 2 G: 2 INJECTION, SOLUTION INTRAVENOUS at 06:17

## 2020-01-01 RX ADMIN — FUROSEMIDE 40 MG: 10 INJECTION, SOLUTION INTRAMUSCULAR; INTRAVENOUS at 06:14

## 2020-01-01 RX ADMIN — SODIUM CHLORIDE 10 ML: 9 INJECTION, SOLUTION INTRAVENOUS at 17:03

## 2020-01-01 RX ADMIN — POTASSIUM CHLORIDE 20 MEQ: 20 TABLET, EXTENDED RELEASE ORAL at 06:00

## 2020-01-01 RX ADMIN — RISPERIDONE 0.5 MG: 0.5 TABLET ORAL at 17:42

## 2020-01-01 RX ADMIN — GUAIFENESIN 200 MG: 100 SOLUTION ORAL at 06:25

## 2020-01-01 RX ADMIN — RISPERIDONE 0.5 MG: 0.5 TABLET ORAL at 18:09

## 2020-01-01 RX ADMIN — GUAIFENESIN 200 MG: 100 SOLUTION ORAL at 14:02

## 2020-01-01 RX ADMIN — FUROSEMIDE 40 MG: 10 INJECTION, SOLUTION INTRAMUSCULAR; INTRAVENOUS at 08:56

## 2020-01-01 RX ADMIN — GUAIFENESIN 200 MG: 100 SOLUTION ORAL at 09:44

## 2020-01-01 RX ADMIN — FUROSEMIDE 20 MG: 20 TABLET ORAL at 06:26

## 2020-01-01 RX ADMIN — HALOPERIDOL LACTATE 5 MG: 5 INJECTION, SOLUTION INTRAMUSCULAR at 02:17

## 2020-01-01 RX ADMIN — CEFAZOLIN SODIUM 1 G: 1 INJECTION, SOLUTION INTRAVENOUS at 08:56

## 2020-01-01 RX ADMIN — CEFAZOLIN SODIUM 1 G: 1 INJECTION, SOLUTION INTRAVENOUS at 17:00

## 2020-01-01 RX ADMIN — DILTIAZEM HYDROCHLORIDE 60 MG: 30 TABLET, FILM COATED ORAL at 18:09

## 2020-01-01 RX ADMIN — AMPICILLIN SODIUM AND SULBACTAM SODIUM 3 G: 2; 1 INJECTION, POWDER, FOR SOLUTION INTRAMUSCULAR; INTRAVENOUS at 17:20

## 2020-01-01 RX ADMIN — DILTIAZEM HYDROCHLORIDE 60 MG: 30 TABLET, FILM COATED ORAL at 18:00

## 2020-01-01 RX ADMIN — POTASSIUM CHLORIDE 20 MEQ: 20 TABLET, EXTENDED RELEASE ORAL at 08:58

## 2020-01-01 RX ADMIN — DILTIAZEM HYDROCHLORIDE 60 MG: 30 TABLET, FILM COATED ORAL at 06:26

## 2020-01-01 RX ADMIN — FUROSEMIDE 40 MG: 10 INJECTION, SOLUTION INTRAMUSCULAR; INTRAVENOUS at 05:39

## 2020-01-01 RX ADMIN — FUROSEMIDE 40 MG: 10 INJECTION, SOLUTION INTRAMUSCULAR; INTRAVENOUS at 16:10

## 2020-01-01 RX ADMIN — DILTIAZEM HYDROCHLORIDE 60 MG: 30 TABLET, FILM COATED ORAL at 23:44

## 2020-01-01 RX ADMIN — CEFAZOLIN SODIUM 2 G: 2 INJECTION, SOLUTION INTRAVENOUS at 16:46

## 2020-01-01 RX ADMIN — CEFAZOLIN SODIUM 2 G: 2 INJECTION, SOLUTION INTRAVENOUS at 22:36

## 2020-01-01 RX ADMIN — Medication: at 06:27

## 2020-01-01 RX ADMIN — AMOXICILLIN AND CLAVULANATE POTASSIUM 1 TABLET: 250; 125 TABLET, FILM COATED ORAL at 06:27

## 2020-01-01 RX ADMIN — DILTIAZEM HYDROCHLORIDE 240 MG: 120 CAPSULE, COATED, EXTENDED RELEASE ORAL at 05:33

## 2020-01-01 RX ADMIN — LORAZEPAM 1 MG: 2 INJECTION INTRAMUSCULAR; INTRAVENOUS at 01:45

## 2020-01-01 RX ADMIN — LINEZOLID 600 MG: 600 TABLET, FILM COATED ORAL at 17:19

## 2020-01-01 RX ADMIN — RISPERIDONE 0.5 MG: 0.5 TABLET ORAL at 17:03

## 2020-01-01 RX ADMIN — GUAIFENESIN 200 MG: 100 SOLUTION ORAL at 17:43

## 2020-01-01 RX ADMIN — GUAIFENESIN 200 MG: 100 SOLUTION ORAL at 20:31

## 2020-01-01 RX ADMIN — DILTIAZEM HYDROCHLORIDE 60 MG: 30 TABLET, FILM COATED ORAL at 12:11

## 2020-01-01 RX ADMIN — CEFTRIAXONE SODIUM 1 G: 1 INJECTION, POWDER, FOR SOLUTION INTRAMUSCULAR; INTRAVENOUS at 13:12

## 2020-01-01 RX ADMIN — Medication: at 08:56

## 2020-01-01 RX ADMIN — SENNOSIDES AND DOCUSATE SODIUM 2 TABLET: 8.6; 5 TABLET ORAL at 17:03

## 2020-01-01 RX ADMIN — AMPICILLIN SODIUM AND SULBACTAM SODIUM 3 G: 2; 1 INJECTION, POWDER, FOR SOLUTION INTRAMUSCULAR; INTRAVENOUS at 05:35

## 2020-01-01 RX ADMIN — AMOXICILLIN AND CLAVULANATE POTASSIUM 1 TABLET: 250; 125 TABLET, FILM COATED ORAL at 20:31

## 2020-01-01 RX ADMIN — AMOXICILLIN AND CLAVULANATE POTASSIUM 1 TABLET: 250; 125 TABLET, FILM COATED ORAL at 17:51

## 2020-01-01 RX ADMIN — GUAIFENESIN 200 MG: 100 SOLUTION ORAL at 18:09

## 2020-01-01 RX ADMIN — LINEZOLID 600 MG: 600 TABLET, FILM COATED ORAL at 05:33

## 2020-01-01 RX ADMIN — SENNOSIDES AND DOCUSATE SODIUM 2 TABLET: 8.6; 5 TABLET ORAL at 17:42

## 2020-01-01 RX ADMIN — IPRATROPIUM BROMIDE AND ALBUTEROL SULFATE 3 ML: .5; 3 SOLUTION RESPIRATORY (INHALATION) at 07:49

## 2020-01-01 ASSESSMENT — COGNITIVE AND FUNCTIONAL STATUS - GENERAL
CLIMB 3 TO 5 STEPS WITH RAILING: A LOT
DRESSING REGULAR LOWER BODY CLOTHING: A LITTLE
MOBILITY SCORE: 16
DRESSING REGULAR UPPER BODY CLOTHING: A LITTLE
TURNING FROM BACK TO SIDE WHILE IN FLAT BAD: A LITTLE
MOVING FROM LYING ON BACK TO SITTING ON SIDE OF FLAT BED: A LITTLE
SUGGESTED CMS G CODE MODIFIER DAILY ACTIVITY: CK
EATING MEALS: A LITTLE
STANDING UP FROM CHAIR USING ARMS: A LITTLE
DAILY ACTIVITIY SCORE: 18
PERSONAL GROOMING: A LITTLE
HELP NEEDED FOR BATHING: A LITTLE
MOVING TO AND FROM BED TO CHAIR: A LITTLE
SUGGESTED CMS G CODE MODIFIER MOBILITY: CK
WALKING IN HOSPITAL ROOM: A LOT
TOILETING: A LITTLE

## 2020-01-01 ASSESSMENT — ENCOUNTER SYMPTOMS
BRUISES/BLEEDS EASILY: 1
DIZZINESS: 0
ABDOMINAL PAIN: 0
BACK PAIN: 0
PALPITATIONS: 0
ORTHOPNEA: 1
DIAPHORESIS: 0
CHILLS: 0
ABDOMINAL PAIN: 0
NECK PAIN: 0
PALPITATIONS: 0
MYALGIAS: 0
DIZZINESS: 0
SHORTNESS OF BREATH: 1
FEVER: 0
CHILLS: 0
HEADACHES: 0
HEADACHES: 0
SHORTNESS OF BREATH: 1
ABDOMINAL PAIN: 0
COUGH: 1
CLAUDICATION: 0
VOMITING: 0
VOMITING: 0
MYALGIAS: 0
FEVER: 0
NAUSEA: 0
BLURRED VISION: 0
COUGH: 0
DECREASED APPETITE: 0
PALPITATIONS: 0
FEVER: 0
NAUSEA: 0
COUGH: 1
DOUBLE VISION: 0
VOMITING: 0
NAUSEA: 0
SHORTNESS OF BREATH: 1
PALPITATIONS: 0
SORE THROAT: 0
BLURRED VISION: 0
MEMORY LOSS: 1
MYALGIAS: 0
MYALGIAS: 0
NECK PAIN: 0
VOMITING: 0
ABDOMINAL PAIN: 0
SNORING: 0
VOMITING: 0
DYSPNEA ON EXERTION: 1
CHILLS: 0
FEVER: 0
LOSS OF CONSCIOUSNESS: 0
PALPITATIONS: 0
FEVER: 0
ABDOMINAL PAIN: 0
ABDOMINAL PAIN: 0
BLOATING: 1
NAUSEA: 0
DIARRHEA: 0
NAUSEA: 0
BRUISES/BLEEDS EASILY: 1
DIZZINESS: 0
WEAKNESS: 0
WHEEZING: 1
FEVER: 0
SYNCOPE: 0
SHORTNESS OF BREATH: 0
SHORTNESS OF BREATH: 1
NEAR-SYNCOPE: 0
DIZZINESS: 0
HEADACHES: 0
SHORTNESS OF BREATH: 1
DIARRHEA: 0
ORTHOPNEA: 0
FEVER: 0
DIZZINESS: 0
PALPITATIONS: 0
DIZZINESS: 0
PND: 0

## 2020-01-01 ASSESSMENT — CHA2DS2 SCORE
VASCULAR DISEASE: NO
DIABETES: NO
AGE 65 TO 74: NO
AGE 75 OR GREATER: YES
CHF OR LEFT VENTRICULAR DYSFUNCTION: NO
SEX: MALE
HYPERTENSION: YES
PRIOR STROKE OR TIA OR THROMBOEMBOLISM: YES
CHA2DS2 VASC SCORE: 5

## 2020-01-01 ASSESSMENT — LIFESTYLE VARIABLES
HAVE PEOPLE ANNOYED YOU BY CRITICIZING YOUR DRINKING: NO
TOTAL SCORE: 0
TOTAL SCORE: 0
CONSUMPTION TOTAL: NEGATIVE
ALCOHOL_USE: NO
DOES PATIENT WANT TO STOP DRINKING: NO
EVER_SMOKED: NEVER
ON A TYPICAL DAY WHEN YOU DRINK ALCOHOL HOW MANY DRINKS DO YOU HAVE: 0
HOW MANY TIMES IN THE PAST YEAR HAVE YOU HAD 5 OR MORE DRINKS IN A DAY: 0
EVER FELT BAD OR GUILTY ABOUT YOUR DRINKING: NO
EVER_SMOKED: NEVER
AVERAGE NUMBER OF DAYS PER WEEK YOU HAVE A DRINK CONTAINING ALCOHOL: 0
HAVE YOU EVER FELT YOU SHOULD CUT DOWN ON YOUR DRINKING: NO
EVER HAD A DRINK FIRST THING IN THE MORNING TO STEADY YOUR NERVES TO GET RID OF A HANGOVER: NO
TOTAL SCORE: 0

## 2020-01-01 ASSESSMENT — COPD QUESTIONNAIRES: HAVE YOU SMOKED AT LEAST 100 CIGARETTES IN YOUR ENTIRE LIFE: NO/DON'T KNOW

## 2020-01-01 ASSESSMENT — PATIENT HEALTH QUESTIONNAIRE - PHQ9
2. FEELING DOWN, DEPRESSED, IRRITABLE, OR HOPELESS: NOT AT ALL
CLINICAL INTERPRETATION OF PHQ2 SCORE: 0
1. LITTLE INTEREST OR PLEASURE IN DOING THINGS: NOT AT ALL
SUM OF ALL RESPONSES TO PHQ9 QUESTIONS 1 AND 2: 0

## 2020-01-09 NOTE — TELEPHONE ENCOUNTER
CW    Pt's daughter Hollie reports he has swelling in left arm and hand, right leg swollen around knee area. Also reports testicles are swollen and on the bottom of both feet he has blisters. Please call Hollie at [phone] to find out what to do.

## 2020-01-10 PROBLEM — I44.30 AV BLOCK: Status: RESOLVED | Noted: 2019-01-01 | Resolved: 2020-01-01

## 2020-01-10 PROBLEM — N17.9 ACUTE RENAL FAILURE SUPERIMPOSED ON STAGE 4 CHRONIC KIDNEY DISEASE (HCC): Status: RESOLVED | Noted: 2019-01-01 | Resolved: 2020-01-01

## 2020-01-10 PROBLEM — R79.89 ELEVATED SERUM CREATININE: Status: RESOLVED | Noted: 2019-01-01 | Resolved: 2020-01-01

## 2020-01-10 PROBLEM — N18.4 ACUTE RENAL FAILURE SUPERIMPOSED ON STAGE 4 CHRONIC KIDNEY DISEASE (HCC): Status: RESOLVED | Noted: 2019-01-01 | Resolved: 2020-01-01

## 2020-01-10 NOTE — TELEPHONE ENCOUNTER
"Returned Hollie's phone call and reviewed findings.  She states, \"his left hand and right leg is swollen.  The bottom of his legs have blisters are that are popping and weaping.  I've given him additional tab of Lasix for 2 days and it hasn't helped.  We are at his PCP and they recommended pt to be seen by Cardiology.  She didn't change any of his meds.\"  Upon chart review, next availability for BERT is tomorrow 1/10/2020 at 1345.  She accepts offer.  Reassurance given that PAR will contact Hollie to confirm appt.  She verbalizes understanding and states no other concerns or questions at this time.  She is appreciative of information given.    Task deferred to PAR to schedule pt.  "

## 2020-01-10 NOTE — PATIENT INSTRUCTIONS
Increase your water intake by one more glass of water a day.    Watch your salt in your diet.    Increase your furosemide to 40 mg once in the morning.    We will do lab work in a few days. Non-fasting lab work.    Take your weight and BP daily and keep a log.    We will see you back in 1-2 weeks with labs and BP log and weight log.

## 2020-01-10 NOTE — PROGRESS NOTES
Chief Complaint   Patient presents with   • Edema     Subjective:   Primitivo Milan is a 89 y.o. male who presents today for urgent evaluation of bilateral lower leg swelling with weeping wounds.    He is a patient of Dr. Marshall in our office.    Hx of PAF with PPM placement on chronic anticoagulation, HTN, TIA, moderate aortic stenosis, and age related debility and memory loss.    He presents with his daughter who helps with his care.    He uses a walker. He is in no distress today.    For the past 4-5 days has noticed an increase in lower leg swelling with weeping wounds. He has been keeping them clean and dry.    He has no other cardiac complaints.    He is very hard of hearing and doesn't quite answer questions appropriately due to this. His daughter does most of the talking today.    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 Trace MR, moderate AS (peak 40mmHg, mean 23mmHg), no AI. Moderate TR. RVSP 45mmHg.   • Other dyspnea and respiratory abnormality    • Other malignant lymphomas, unspecified site, extranodal and solid organ sites    • Paroxysmal atrial fibrillation (HCC)    • Permanent atrial fibrillation - since 5/2019    • Personal history of malignant neoplasm of prostate    • Personal history of venous thrombosis and embolism    • Pneumonia due to infectious organism 12/11/2018   • Presence of permanent cardiac pacemaker 09/2009    Medtronic Versa VEDR01 implanted by Dr. Lexa Lopes.    •  Prostate cancer (HCC) 1991    Treated with surgery   • Renal disorder 1970    stones   • Scalp laceration 4/17/2019   • Sleep apnea     Cannot use CPAP machine.   • Stroke (HCC) 1991   • TIA (transient ischemic attack)    • Trauma 4/17/2019     Past Surgical History:   Procedure Laterality Date   • RECOVERY  5/19/2011    Performed by SURGERY, IR-RECOVERY at [facility]   • PACEMAKER INSERTION  September 2009    Medtronic Versa VEDR01 implanted by Dr. Lexa Lopes.   • EYE SURGERY  2006    left eye mass/ cancer/ s/p radiation   • LITHOTRIPSY  1995    left kidney x 3   • PROSTATECTOMY, RADICAL RETRO  1991   • EYE SURGERY       Family History   Problem Relation Age of Onset   • Heart Disease Mother    • Heart Attack Mother    • Heart Disease Father    • Heart Attack Father    • No Known Problems Maternal Grandmother    • No Known Problems Maternal Grandfather    • No Known Problems Paternal Grandmother    • No Known Problems Paternal Grandfather      Social History     Socioeconomic History   • Marital status:      Spouse name: Not on file   • Number of children: Not on file   • Years of education: Not on file   • Highest education level: Not on file   Occupational History   • Not on file   Social Needs   • Financial resource strain: Not on file   • Food insecurity:     Worry: Not on file     Inability: Not on file   • Transportation needs:     Medical: Not on file     Non-medical: Not on file   Tobacco Use   • Smoking status: Never Smoker   • Smokeless tobacco: Never Used   Substance and Sexual Activity   • Alcohol use: No     Alcohol/week: 0.0 oz   • Drug use: No   • Sexual activity: Never     Partners: Female     Comment:  72 years 6/15   Lifestyle   • Physical activity:     Days per week: Not on file     Minutes per session: Not on file   • Stress: Not on file   Relationships   • Social connections:     Talks on phone: Not on file     Gets together: Not on file     Attends Christianity  service: Not on file     Active member of club or organization: Not on file     Attends meetings of clubs or organizations: Not on file     Relationship status: Not on file   • Intimate partner violence:     Fear of current or ex partner: Not on file     Emotionally abused: Not on file     Physically abused: Not on file     Forced sexual activity: Not on file   Other Topics Concern   • Not on file   Social History Narrative    Retired from [employer].  on 10/14/2019 after 72 years of marriage.     Allergies   Allergen Reactions   • Iodine      Outpatient Encounter Medications as of 1/10/2020   Medication Sig Dispense Refill   • furosemide (LASIX) 20 MG Tab Take 2 Tabs by mouth every day. 60 Tab 11   • triamcinolone acetonide (KENALOG) 0.1 % Cream Apply 1 g to affected area(s) 2 times a day as needed. 1 Tube 11   • potassium chloride SA (KDUR) 20 MEQ Tab CR Take 1 Tab by mouth every day. 90 Tab 1   • DILTIAZem CD (CARDIZEM CD) 240 MG CAPSULE SR 24 HR Take 1 Cap by mouth every day. 90 Cap 3   • warfarin (COUMADIN) 5 MG Tab TAKE 1-1.5 TABLETS BY MOUTH OR AS DIRECTED BY COUMADIN CLINIC 135 Tab 1   • Cholecalciferol (VITAMIN D3) 5000 units Tab Take 5,000 Units by mouth every day at 6 PM.     • Magnesium 400 MG Tab Take 400 mg by mouth every day.     • [DISCONTINUED] furosemide (LASIX) 20 MG Tab Take 1 Tab by mouth every day. May take an additional 20 mg in the day as directed by cardiology 180 Tab 1     No facility-administered encounter medications on file as of 1/10/2020.      Review of Systems   Constitutional: Positive for malaise/fatigue. Negative for fever.   Respiratory: Negative for cough and shortness of breath.    Cardiovascular: Positive for leg swelling. Negative for chest pain, palpitations, orthopnea, claudication and PND.   Gastrointestinal: Negative for abdominal pain.   Musculoskeletal: Negative for myalgias.   Neurological: Negative for dizziness.   Psychiatric/Behavioral: Positive for  "memory loss.        Objective:   /70 (BP Location: Left arm, Patient Position: Sitting, BP Cuff Size: Adult)   Pulse 88   Ht 1.676 m (5' 6\")   Wt 77.6 kg (171 lb 1.2 oz)   SpO2 90%   BMI 27.61 kg/m²     Physical Exam   Constitutional: He appears well-developed and well-nourished.   HENT:   Head: Normocephalic and atraumatic.   Eyes: EOM are normal.   Neck: No JVD present.   Cardiovascular: Normal rate, regular rhythm, normal heart sounds and intact distal pulses.   Pulmonary/Chest: Effort normal and breath sounds normal.   Musculoskeletal:         General: Edema present.      Comments: Bilateral leg swelling with weeping wounds; walker for mobility   Skin: Skin is warm and dry.   Psychiatric: He has a normal mood and affect. Cognition and memory are impaired. He exhibits abnormal recent memory and abnormal remote memory.   Nursing note and vitals reviewed.      Assessment:     1. Presence of permanent cardiac pacemaker     2. Paroxysmal atrial fibrillation (HCC)     3. Essential hypertension  furosemide (LASIX) 20 MG Tab   4. Chronic anticoagulation     5. Moderate aortic stenosis     6. History of TIA (transient ischemic attack) and stroke     7. Lower extremity edema  Basic Metabolic Panel    proBrain Natriuretic Peptide, NT   8. Bilateral lower extremity edema  furosemide (LASIX) 20 MG Tab     Medical Decision Making:  Today's Assessment / Status / Plan:     1. PPM placement  -mode switch 99% at last apt, battery life going down at 10-48 months  -next device check in a few months    2. PAF on anticoagulation  -rate controlled and asymptomatic  -cont OAC, tolerating well  -cont dilt for rate control    3. New onset LE edema with weeping wounds  -increase lasix to 40 mg QD  -wound clinic for wounds  -keep wounds clean, dry, and use absorbent pads for weeping  -recommend leg elevation when able  -reduce Na in diet, stay hydrated  -repeat bnp and bmp for review prior to next apt  -lungs clear on exam, no " cough or SMITH noted  -will keep a daily weight log and bring to next apt  -he does have CKD, follow labs    4. Moderate AS  -asymptomatic at this time  -would recommend medical management and palliative care with his advanced age and debility if his AS worsened   -follow clinically    5. HTN  -good control  -watch with increase in lasix, keep BP log and bring to next apt    FU in clinic in 1-2 weeks with AH with labs     Patient verbalizes understanding and agrees with the plan of care.     Collaborating MD: Byron GAXIOLA

## 2020-01-13 NOTE — ASSESSMENT & PLAN NOTE
"Chronic, stable.    Currently taking diltiazem  mg/day and furosemide 20 mg/day with an additional 20 mg/day as needed for BLE edema.   Reports diet is \"okay\".   He is not following a low-salt diet.  He is not exercising regularly.  He is not taking baby aspirin daily.   He is not monitoring BP at home.   Reports: BLE edema, scrotal edema, BLE weeping  Denies symptoms low BP: light-headed, tunnel-vision, unusual fatigue, dizziness.  Denies symptoms high BP: pounding headache, visual changes, palpitations, flushed face.   Denies chest pain, shortness of breath, dyspnea on exertion.   Denies medicine side effects: unusual fatigue, slow heartbeat, cough.  "

## 2020-01-13 NOTE — PROGRESS NOTES
"CC:   Chief Complaint   Patient presents with   • Leg Swelling     hand leg and testi swelling x 3 day    • Laceration     right lower arm x 2 days      HISTORY OF THE PRESENT ILLNESS: Patient is a 89 y.o. male. This pleasant patient is here today to follow up on BLE and scrotal edema and weeping.    Health Maintenance: Completed    Bilateral lower extremity edema  Chronic problem for the patient that is worsening.  Patient continues to experience bilateral lower extremity edema, redness, and weeping.  The patient is now also experiencing scrotal edema.  The patient continues furosemide 20 mg/day with an additional 20 mg/day as needed. The patients daughter attempted to contact cardiology through my chart for right hand swelling and bilateral lower extremity edema with weeping and scrotal edema but did not receive a return message or call.    Essential hypertension  Chronic, stable.    Currently taking diltiazem  mg/day and furosemide 20 mg/day with an additional 20 mg/day as needed for BLE edema.   Reports diet is \"okay\".   He is not following a low-salt diet.  He is not exercising regularly.  He is not taking baby aspirin daily.   He is not monitoring BP at home.   Reports: BLE edema, scrotal edema, BLE weeping  Denies symptoms low BP: light-headed, tunnel-vision, unusual fatigue, dizziness.  Denies symptoms high BP: pounding headache, visual changes, palpitations, flushed face.   Denies chest pain, shortness of breath, dyspnea on exertion.   Denies medicine side effects: unusual fatigue, slow heartbeat, cough.    Stage 4 chronic kidney disease (HCC)  Chronic, ongoing.  The patient continues to take furosemide 20-40 mg/day for bilateral lower extremity edema.  He is experiencing worsening bilateral lower extremity edema with weeping as well as scrotal edema.   10/9/2019 03:23 10/10/2019 02:09 10/11/2019 02:25 11/21/2019 14:52   Bun 33 (H) 50 (H) 61 (H) 34 (H)   Creatinine 1.37 2.22 (H) 2.32 (H) 1.69 (H) "   GFR If  58 (A) 33 (A) 32 (A) 46 (A)   GFR If Non  48 (A) 27 (A) 26 (A) 38 (A)     Allergies: Iodine    Current Outpatient Medications Ordered in Epic   Medication Sig Dispense Refill   • triamcinolone acetonide (KENALOG) 0.1 % Cream Apply 1 g to affected area(s) 2 times a day as needed. 1 Tube 11   • potassium chloride SA (KDUR) 20 MEQ Tab CR Take 1 Tab by mouth every day. 90 Tab 1   • DILTIAZem CD (CARDIZEM CD) 240 MG CAPSULE SR 24 HR Take 1 Cap by mouth every day. 90 Cap 3   • warfarin (COUMADIN) 5 MG Tab TAKE 1-1.5 TABLETS BY MOUTH OR AS DIRECTED BY COUMADIN CLINIC 135 Tab 1   • Cholecalciferol (VITAMIN D3) 5000 units Tab Take 5,000 Units by mouth every day at 6 PM.     • Magnesium 400 MG Tab Take 400 mg by mouth every day.     • furosemide (LASIX) 20 MG Tab Take 2 Tabs by mouth every day. 60 Tab 11     No current [facility]-ordered facility-administered medications on file.      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 Trace MR, moderate AS (peak 40mmHg, mean 23mmHg), no AI. Moderate TR. RVSP 45mmHg.   • Other dyspnea and respiratory abnormality    • Other malignant lymphomas, unspecified site, extranodal and solid organ sites    • Paroxysmal atrial fibrillation (HCC)    • Permanent atrial fibrillation - since 5/2019    • Personal history of malignant neoplasm of prostate    • Personal history of venous thrombosis and embolism    • Pneumonia due to infectious organism  12/11/2018   • Presence of permanent cardiac pacemaker 09/2009    Medtronic Versa VEDR01 implanted by Dr. Lexa Lopes.    • Prostate cancer (HCC) 1991    Treated with surgery   • Renal disorder 1970    stones   • Scalp laceration 4/17/2019   • Sleep apnea     Cannot use CPAP machine.   • Stroke (HCC) 1991   • TIA (transient ischemic attack)    • Trauma 4/17/2019     Past Surgical History:   Procedure Laterality Date   • RECOVERY  5/19/2011    Performed by SURGERY, IR-RECOVERY at [facility]   • PACEMAKER INSERTION  September 2009    Medtronic Versa VEDR01 implanted by Dr. Lexa Lopes.   • EYE SURGERY  2006    left eye mass/ cancer/ s/p radiation   • LITHOTRIPSY  1995    left kidney x 3   • PROSTATECTOMY, RADICAL RETRO  1991   • EYE SURGERY       Social History     Tobacco Use   • Smoking status: Never Smoker   • Smokeless tobacco: Never Used   Substance Use Topics   • Alcohol use: No     Alcohol/week: 0.0 oz   • Drug use: No     Social History     Patient does not qualify to have social determinant information on file (likely too young).   Social History Narrative    Retired from [employer].  on 10/14/2019 after 72 years of marriage.     Family History   Problem Relation Age of Onset   • Heart Disease Mother    • Heart Attack Mother    • Heart Disease Father    • Heart Attack Father    • No Known Problems Maternal Grandmother    • No Known Problems Maternal Grandfather    • No Known Problems Paternal Grandmother    • No Known Problems Paternal Grandfather      ROS:   Constitutional:  Negative for fever, chills, unexpected weight change, night sweats, body aches, sleep issues, and fatigue/generalized weakness.   HEENT: Positive for bilateral hearing loss. Negative for headaches, vision changes, ear pain, tinnitus, ear discharge, rhinorrhea, sinus congestion, sneezing, sore throat, and neck pain.    Respiratory:  Negative for cough, shortness of breath, sputum production, hemoptysis,  "chest congestion, dyspnea, wheezing, and crackles.    Cardiovascular: Positive for right hand swelling, BLE edema and weeping. Negative for chest pain, palpitations, SMITH, paroxsymal nocturnal dyspnea, orthopnea.   Gastrointestinal:  Negative for heartburn, nausea, vomiting, abdominal pain, hematochezia, melena, diarrhea, constipation, and greasy/foul-smelling stools.   Genitourinary: Positive for urinary incontinence and scrotal edema. Negative for dysuria, nocturia, polyuria, hematuria, pyuria, urinary urgency, urinary frequency.   Musculoskeletal:  Negative for myalgias, back pain, and joint pain.   Skin: Positive for right forearm skin tear, BLE redness, weeping, and itching. Negative for rash, sores, lumps, cyanotic skin color change.   Neurological: Negative for dizziness, tingling, tremors, focal sensory deficit, focal weakness and headaches.   Endo/Heme/Allergies: Patient is on chronic anticoagulation with coumadin. Denies cold/heat intolerance.   Psychiatric/Behavioral: Positive for memory loss. Negative for depression, suicidal/homicidal ideation.        Exam: /84 (BP Location: Left arm, Patient Position: Sitting, BP Cuff Size: Adult)   Pulse 74   Temp 36.7 °C (98.1 °F) (Temporal)   Ht 1.676 m (5' 6\")   Wt 77.6 kg (171 lb)   SpO2 90%  Body mass index is 27.6 kg/m².    General:  Normal appearing. No distress.  HEENT: Pupils reactive to light, unequal Left 3 Right 2. Bilateral baseline lid ptosis. Normocephalic. Eyes conjunctiva clear, ears normal shape and contour, bilateral hearing aides in place, nasal mucosa benign, oropharynx is without erythema, edema or exudates.   Neck:  Supple without JVD or bruit.  Pulmonary:  Clear to ausculation.  Normal effort. No rales, ronchi, or wheezing.  Cardiovascular: Murmur heard. BLE generalized edema 2+ pitting with weeping. Scrotal edema without weeping.  Regular rate and rhythm. Carotid and radial pulses are intact and equal bilaterally.  Abdomen:  Soft, " nontender, nondistended. Normal bowel sounds.  Neurologic:  Grossly nonfocal.  Skin: BLE hemosiderin deposition. Warm and dry.   Musculoskeletal: Unsteady gait, uses walker for mobility. No extremity cyanosis, clubbing, or edema.  Psych:  Normal mood and affect. Alert and oriented x3. Judgment and insight is normal.    Assessment/Plan:  1. Bilateral lower extremity edema  Continue lasix 40 mg/day, the patient as already taken 40 mg of lasix today. Patient was assisted to schedule appointment with cardiology on 1/10/2020. Plan to refer to wound clinic to help manage BLE weeping/wounds. Encouraged patient/daughter to keep BLE wounds clean, dry, and to use gauze to absorb. Encouraged patient to elevated legs as often as possible. Encouraged decreased sodium in diet and to stay hydrate.   - REFERRAL TO WOUND CLINIC    2. Stage 4 chronic kidney disease (HCC)  Chronic, ongoing.  Plan to recheck BMP with increased dose of furosemide.  Continue potassium chloride SA 20 mEq's per day.    3. Essential Hypertension  Continue diltiazem  mg/day and furosemide 20 mg/day with an additional 20 mg/day as needed for edema.    Patient was seen for at least 25 minutes total face-to-face time with greater than 50% of that time spent on counseling and care coordination.    Educated in proper administration of medication(s) ordered today including safety, possible SE, risks, benefits, rationale and alternatives to therapy.   Supportive care, differential diagnoses, and indications for immediate follow-up discussed with patient.    Pathogenesis of diagnosis discussed including typical length and natural progression.    Instructed to return to clinic or nearest emergency department for any change in condition, further concerns, or worsening of symptoms.  Patient states understanding of the plan of care and discharge instructions.    Return if symptoms worsen or fail to improve.    Please note that this dictation was created using  voice recognition software. I have made every reasonable attempt to correct obvious errors, but I expect that there are errors of grammar and possibly content that I did not discover before finalizing the note.

## 2020-01-13 NOTE — ASSESSMENT & PLAN NOTE
Chronic problem for the patient that is worsening.  Patient continues to experience bilateral lower extremity edema, redness, and weeping.  The patient is now also experiencing scrotal edema.  The patient continues furosemide 20 mg/day with an additional 20 mg/day as needed. The patients daughter attempted to contact cardiology through my chart for right hand swelling and bilateral lower extremity edema with weeping and scrotal edema but did not receive a return message or call.

## 2020-01-13 NOTE — ASSESSMENT & PLAN NOTE
Chronic, ongoing.  The patient continues to take furosemide 20-40 mg/day for bilateral lower extremity edema.  He is experiencing worsening bilateral lower extremity edema with weeping as well as scrotal edema.   10/9/2019 03:23 10/10/2019 02:09 10/11/2019 02:25 11/21/2019 14:52   Bun 33 (H) 50 (H) 61 (H) 34 (H)   Creatinine 1.37 2.22 (H) 2.32 (H) 1.69 (H)   GFR If  58 (A) 33 (A) 32 (A) 46 (A)   GFR If Non  48 (A) 27 (A) 26 (A) 38 (A)

## 2020-01-14 NOTE — TELEPHONE ENCOUNTER
"Hollie's following up on this call, reports he still has some \"weaping.\" She wants to know if he should be seen at the wound clinic for this, he was approved for an appt. She can be reached at [phone]  "

## 2020-01-14 NOTE — PROGRESS NOTES
Anticoagulation Summary  As of 2020    INR goal:   2.0-3.0   TTR:   76.4 % (4.6 y)   INR used for dosin.00! (2020)   Warfarin maintenance plan:   2.5 mg (5 mg x 0.5) every Tue, Thu, Sat; 5 mg (5 mg x 1) all other days   Weekly warfarin total:   27.5 mg   Plan last modified:   Sánchez Ochoa, PharmD (10/22/2019)   Next INR check:   2020   Target end date:   Indefinite    Indications    Chronic anticoagulation [Z79.01]  Paroxysmal atrial fibrillation (HCC) [I48.0]  History of TIA (transient ischemic attack) and stroke [Z86.73]             Anticoagulation Episode Summary     INR check location:   Clinic Lab    Preferred lab:       Send INR reminders to:       Comments:         Anticoagulation Care Providers     Provider Role Specialty Phone number    Taz Jc M.D. Referring Cardiology [phone]    Jean Ayala, PharmD Responsible          Anticoagulation Patient Findings  Patient Findings     Positives:   Change in diet/appetite    Negatives:   Signs/symptoms of thrombosis, Signs/symptoms of bleeding, Laboratory test error suspected, Change in health, Change in alcohol use, Change in activity, Upcoming invasive procedure, Emergency department visit, Upcoming dental procedure, Missed doses, Extra doses, Change in medications, Hospital admission, Bruising, Other complaints    Comments:   Had some cranberries yesterday         HPI:   Primitivo Milan seen in clinic today, on anticoagulation therapy with warfarin for stroke prevention due to history of PAF and TIA.    Patient's previous INR was supratherapeutic at 3.3 on 19, at which time patient was instructed to decrease one dose, then resume current warfarin regimen.  He returns to clinic today to recheck INR to ensure it is therapeutic and thus preventing possible clotting and/or bleeding/bruising complications.    CHADS-VASc = at least 5  (unadjusted ischemic stroke risk/year:  7.2%, which is high risk given hx of TIA)    Does  patient have any changes to current medical/health status since last appt (Y/N):  NO  Does patient have any signs/symptoms of bleeding and/or thrombosis since the last appt (Y/N):  NO  Does patient have any interval changes to diet or medications since last appt (Y/N):  Had cranberries in [facility] yesterday.  Are there any complications or cost restrictions with current therapy (Y/N):  NO     Does patient have [facility] PCP? YES, Vicki SHIPLEY (If not, please document discussion that patient must be seen at [facility])       Vitals:  declined at today's visit.    There were no vitals filed for this visit.     Asssessment:      INR critically supratherapeutic at 5.0, therefore increasing patient's bleeding risk.   Reason(s) for out of range INR today:  Likely due to cranberry intake yesterday.      Plan:  Instructed patient to HOLD X 2 (starting tomorrow, already took today's dose), then resume current warfarin regimen in order to bring INR to therapeutic range.     Follow up:  Because warfarin is a high risk medication and current CHEST guidelines recommend regular monitoring intervals (few days up to 12 weeks), will have patient return to clinic in 1 weeks to recheck INR (due to patient availability and mobility).    Sánchez Ochoa, PharmD, BCACP

## 2020-01-16 NOTE — PATIENT COMMUNICATION
D/w SC who reviewed pt's pictures. Only change from when she saw pt on 1/10/20 is that there is now some granulated tissue. Recommendations to f/u with wound clinic at scheduled appt, and if they are unable to get him in sooner but pt still has wound concerns, then to f/u with PCP. From cardiac perspective as long as swelling has improved, no cardiac changes at this time.

## 2020-01-17 NOTE — PROGRESS NOTES
Subjective:     Primitivo Milan is a 89 y.o. male who presents for Blister (x 1 week. both legs, redness, swelling, popped )      Patient complains of wounds in his leg for some time now and is got an appointment with the wound clinic pending and now he complains of increased redness and discharge from the wounds in his leg.  he also complains of swelling in the scrotal area not sure how long it is been there and he is seen a cardiologist and his Lasix was increased and that has helped his leg swelling.    No fever and he occasionally has some shortness of breath and wheezing which does not last very long  Blister   This is a new problem. The current episode started 1 to 4 weeks ago. The problem occurs constantly. The problem has been gradually worsening. Associated symptoms include a rash. Pertinent negatives include no abdominal pain, fever, headaches, nausea or vomiting.     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 Trace MR, moderate AS (peak 40mmHg, mean 23mmHg), no AI. Moderate TR. RVSP 45mmHg.   • Other dyspnea and respiratory abnormality    • Other malignant lymphomas, unspecified site, extranodal and solid organ sites    • Paroxysmal atrial fibrillation (HCC)    • Permanent atrial fibrillation - since 5/2019    • Personal history of malignant neoplasm of prostate    • Personal history of venous thrombosis and embolism    • Pneumonia due to infectious organism 12/11/2018   • Presence of  permanent cardiac pacemaker 09/2009    Medtronic Versa VEDR01 implanted by Dr. Lexa Lopes.    • Prostate cancer (HCC) 1991    Treated with surgery   • Renal disorder 1970    stones   • Scalp laceration 4/17/2019   • Sleep apnea     Cannot use CPAP machine.   • Stroke (HCC) 1991   • TIA (transient ischemic attack)    • Trauma 4/17/2019     Past Surgical History:   Procedure Laterality Date   • RECOVERY  5/19/2011    Performed by SURGERY, IR-RECOVERY at [facility]   • PACEMAKER INSERTION  September 2009    Medtronic Versa VEDR01 implanted by Dr. Lexa Lopes.   • EYE SURGERY  2006    left eye mass/ cancer/ s/p radiation   • LITHOTRIPSY  1995    left kidney x 3   • PROSTATECTOMY, RADICAL RETRO  1991   • EYE SURGERY       Social History     Socioeconomic History   • Marital status:      Spouse name: Not on file   • Number of children: Not on file   • Years of education: Not on file   • Highest education level: Not on file   Occupational History   • Not on file   Social Needs   • Financial resource strain: Not on file   • Food insecurity:     Worry: Not on file     Inability: Not on file   • Transportation needs:     Medical: Not on file     Non-medical: Not on file   Tobacco Use   • Smoking status: Never Smoker   • Smokeless tobacco: Never Used   Substance and Sexual Activity   • Alcohol use: No     Alcohol/week: 0.0 oz   • Drug use: No   • Sexual activity: Never     Partners: Female     Comment:  72 years 6/15   Lifestyle   • Physical activity:     Days per week: Not on file     Minutes per session: Not on file   • Stress: Not on file   Relationships   • Social connections:     Talks on phone: Not on file     Gets together: Not on file     Attends [employer] service: Not on file     Active member of club or organization: Not on file     Attends meetings of clubs or organizations: Not on file     Relationship status: Not on file   • Intimate partner violence:     Fear of current or ex  "partner: Not on file     Emotionally abused: Not on file     Physically abused: Not on file     Forced sexual activity: Not on file   Other Topics Concern   • Not on file   Social History Narrative    Retired from [employer].  on 10/14/2019 after 72 years of marriage.      Family History   Problem Relation Age of Onset   • Heart Disease Mother    • Heart Attack Mother    • Heart Disease Father    • Heart Attack Father    • No Known Problems Maternal Grandmother    • No Known Problems Maternal Grandfather    • No Known Problems Paternal Grandmother    • No Known Problems Paternal Grandfather     Review of Systems   Constitutional: Negative for fever.   Gastrointestinal: Negative for abdominal pain, nausea and vomiting.   Skin: Positive for rash.   Neurological: Negative for headaches.   Endo/Heme/Allergies: Bruises/bleeds easily.   All other systems reviewed and are negative.    Allergies   Allergen Reactions   • Iodine       Objective:   /78   Pulse 83   Temp 36.7 °C (98 °F) (Temporal)   Resp 20   Ht 1.676 m (5' 6\")   Wt 77.6 kg (171 lb)   SpO2 93%   BMI 27.60 kg/m²   Physical Exam  Constitutional:       General: He is not in acute distress.     Appearance: He is well-developed.   HENT:      Head: Normocephalic and atraumatic.      Mouth/Throat:      Mouth: Mucous membranes are moist.      Pharynx: Oropharynx is clear.   Eyes:      Conjunctiva/sclera: Conjunctivae normal.   Neck:      Musculoskeletal: No neck rigidity.   Cardiovascular:      Rate and Rhythm: Normal rate and regular rhythm.   Pulmonary:      Effort: Pulmonary effort is normal. No respiratory distress.      Breath sounds: Normal breath sounds.   Genitourinary:     Comments: Swelling of his scrotum and penis present  Lymphadenopathy:      Cervical: No cervical adenopathy.   Skin:     General: Skin is warm and dry.      Capillary Refill: Capillary refill takes less than 2 seconds.      Findings: Bruising and erythema " present.      Comments: Bilateral lower leg-ulcerations present with the surrounding erythema and  serosanguineous discharge   Neurological:      Mental Status: He is alert and oriented to person, place, and time.      Sensory: No sensory deficit.      Deep Tendon Reflexes: Reflexes are normal and symmetric.   Psychiatric:         Mood and Affect: Mood normal.         Behavior: Behavior normal.           Assessment/Plan:   Assessment    1. Cellulitis of lower extremity, unspecified laterality  - cephALEXin (KEFLEX) 500 MG Cap; Take 1 Cap by mouth 3 times a day for 7 days.  Dispense: 21 Cap; Refill: 0    For scrotal swelling and swelling of his hands and told to follow-up with PCP and if if cellulitis gets worse with fever or increased shortness of breath to go to the ER and also follow-up with the wound clinic  Differential diagnosis, natural history, supportive care, and indications for immediate follow-up discussed.

## 2020-01-21 NOTE — TELEPHONE ENCOUNTER
Called patient to see is he has completed nonfasting labs ordered by [facility] in last visit. Spoke with daughter who states he will be completing labs today at [facility] lab on [address]. Confirmed appt with  on 1/24/20 -YANET

## 2020-01-21 NOTE — PROGRESS NOTES
Anticoagulation Summary  As of 1/21/2020    INR goal:   2.0-3.0   TTR:   76.1 % (4.6 y)   INR used for dosing:   3.10! (1/21/2020)   Warfarin maintenance plan:   5 mg (5 mg x 1) every Mon, Wed, Fri; 2.5 mg (5 mg x 0.5) all other days   Weekly warfarin total:   25 mg   Plan last modified:   Sánchez Ochoa, PharmD (1/21/2020)   Next INR check:   1/28/2020   Target end date:   Indefinite    Indications    Chronic anticoagulation [Z79.01]  Paroxysmal atrial fibrillation (HCC) [I48.0]  History of TIA (transient ischemic attack) and stroke [Z86.73]             Anticoagulation Episode Summary     INR check location:   Clinic Lab    Preferred lab:       Send INR reminders to:       Comments:         Anticoagulation Care Providers     Provider Role Specialty Phone number    Taz Jc M.D. Referring Cardiology [phone]    Jean Ayala, PharmD Responsible          Anticoagulation Patient Findings  Patient Findings     Positives:   Change in medications    Negatives:   Signs/symptoms of thrombosis, Signs/symptoms of bleeding, Laboratory test error suspected, Change in health, Change in alcohol use, Change in activity, Upcoming invasive procedure, Emergency department visit, Upcoming dental procedure, Missed doses, Extra doses, Change in diet/appetite, Hospital admission, Bruising, Other complaints    Comments:   Course of cephalexin         HPI:   Primitivo Milan seen in clinic today, on anticoagulation therapy with warfarin for stroke prevention due to history of PAF and TIA.    Patient's previous INR was supratherapeutic at 5.0 on 1-14-20, at which time patient was instructed to hold two doses, then resume current warfarin regimen.  He returns to clinic today to recheck INR to ensure it is therapeutic and thus preventing possible clotting and/or bleeding/bruising complications.    CHADS-VASc = at least 5  (unadjusted ischemic stroke risk/year:  7.2%, which is high risk given hx of TIA)    Does patient have any  changes to current medical/health status since last appt (Y/N):  Continues to have small blisters on legs, some skin cracking on fingers.  Does patient have any signs/symptoms of bleeding and/or thrombosis since the last appt (Y/N):  NO  Does patient have any interval changes to diet or medications since last appt (Y/N):  Course of cephalexin started by urgent care provider last week.  Are there any complications or cost restrictions with current therapy (Y/N):  NO     Does patient have [facility] PCP? YES, Vicki SHIPLEY (If not, please document discussion that patient must be seen at [facility])       Vitals:  declined at today's visit.    There were no vitals filed for this visit.     Asssessment:      INR slightly supratherapeutic at 3.1, therefore increasing patient's bleeding risk.   Reason(s) for out of range INR today:  High INR last week.      Plan:  Instructed patient to decrease weekly warfarin regimen as detailed above in order to bring INR to therapeutic range.     Follow up:  Because warfarin is a high risk medication and current CHEST guidelines recommend regular monitoring intervals (few days up to 12 weeks), will have patient return to clinic in 1 weeks to recheck INR.    Sánchez Ochoa, PharmD, BCACP

## 2020-01-22 NOTE — ED NOTES
A: Met with patient to explain  role and to discuss aftercare options.  R: Patient currently sees PCP Pravin Bautista. She signed a release for her doctor and declined notification. She signed releases for her providers at [facility], however just recently both providers left the clinic. She is still considered a current patient. Patient is aware of aftercare options. She is interested in [facility].  P: Will collaborate with treatment team and with the patient before setting up further aftercare, which will be complete by time of discharge.  Lo Garner LCSW  10/6/2017     Pt made Trauma Yellow per Dr. Howard and Dr. Benson.   scheduled

## 2020-01-23 PROBLEM — L03.119 CELLULITIS OF LOWER EXTREMITY: Status: ACTIVE | Noted: 2020-01-01

## 2020-01-24 PROBLEM — I50.9 ACUTE CONGESTIVE HEART FAILURE (HCC): Status: ACTIVE | Noted: 2020-01-01

## 2020-01-24 NOTE — PATIENT INSTRUCTIONS
1.  Continue Lasix 40 mg in the morning (2 tabs)    2.  Get echo and blood work in approx 2 weeks before seeing me.

## 2020-01-24 NOTE — PROGRESS NOTES
CC:   Chief Complaint   Patient presents with   • Wound Infection     FV     HISTORY OF THE PRESENT ILLNESS: Patient is a 89 y.o. male. this pleasant patient is here today to follow up after urgent care visit for cellulitis.     Health Maintenance: Completed    Bilateral lower extremity edema  Chronic in nature.  Currently 3+ pitting edema to bilateral lower extremities.  Patient still currently taking furosemide 40 mg daily, per cardiology recommendations.  Has an appointment with cardiology tomorrow.  He states that sometimes he feels like there is needles poking in his legs.  Still currently has scrotal edema, but improved from previous assessment.  Daughter states that he has some abdominal edema, when he leans forward he does become short of breath.    Cellulitis of lower extremity  New problem to examiner.  He was seen in the urgent care on 1/16/2020, for cellulitis to right lower extremity has it has started to weep at home.  Patient was started on antibiotic course of Keflex.  He states that it is not weeping as much.  Daughter also confirms that it is improving.  Attached picture from 1/15/2020.  Wound has improved compared to picture.  Has an appointment with wound care on 1/30/2020.            Allergies: Iodine    Current Outpatient Medications Ordered in Epic   Medication Sig Dispense Refill   • potassium chloride SA (KDUR) 20 MEQ Tab CR Take 1 Tab by mouth every day. 90 Tab 1   • cephALEXin (KEFLEX) 500 MG Cap Take 1 Cap by mouth 3 times a day for 7 days. 21 Cap 0   • furosemide (LASIX) 20 MG Tab Take 40 mg by mouth 2 times a day. 60 Tab 11   • DILTIAZem CD (CARDIZEM CD) 240 MG CAPSULE SR 24 HR Take 1 Cap by mouth every day. 90 Cap 3   • warfarin (COUMADIN) 5 MG Tab TAKE 1-1.5 TABLETS BY MOUTH OR AS DIRECTED BY COUMADIN CLINIC (Patient taking differently: Take 5 mg by mouth every day. Take 1-1.5 tablets by mouth or as directed by Coumadin Clinic) 135 Tab 1   • Cholecalciferol (VITAMIN D3) 5000 units Tab  Take 5,000 Units by mouth every day at 6 PM.     • Magnesium 400 MG Tab Take 400 mg by mouth every day.     • triamcinolone acetonide (KENALOG) 0.1 % Cream Apply 1 g to affected area(s) 2 times a day as needed. (Patient not taking: Reported on 1/16/2020) 1 Tube 11     No current [facility]-ordered facility-administered medications on file.      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 Trace MR, moderate AS (peak 40mmHg, mean 23mmHg), no AI. Moderate TR. RVSP 45mmHg.   • Other dyspnea and respiratory abnormality    • Other malignant lymphomas, unspecified site, extranodal and solid organ sites    • Paroxysmal atrial fibrillation (HCC)    • Permanent atrial fibrillation - since 5/2019    • Personal history of malignant neoplasm of prostate    • Personal history of venous thrombosis and embolism    • Pneumonia due to infectious organism 12/11/2018   • Presence of permanent cardiac pacemaker 09/2009    Medtronic Versa VEDR01 implanted by Dr. Lexa Lopes.    • Prostate cancer (HCC) 1991    Treated with surgery   • Renal disorder 1970    stones   • Scalp laceration 4/17/2019   • Sleep apnea     Cannot use CPAP machine.   • Stroke (HCC) 1991   • TIA (transient ischemic attack)    • Trauma 4/17/2019     Past Surgical History:   Procedure Laterality Date   • RECOVERY  5/19/2011    Performed by SURGERY, IR-RECOVERY at [facility]   • PACEMAKER INSERTION  September 2009    Medtronic Versa [id number] implanted  by Dr. Lexa Lopes.   • EYE SURGERY  2006    left eye mass/ cancer/ s/p radiation   • LITHOTRIPSY  1995    left kidney x 3   • PROSTATECTOMY, RADICAL RETRO  1991   • EYE SURGERY       Social History     Tobacco Use   • Smoking status: Never Smoker   • Smokeless tobacco: Never Used   Substance Use Topics   • Alcohol use: No     Alcohol/week: 0.0 oz   • Drug use: No     Social History     Patient does not qualify to have social determinant information on file (likely too young).   Social History Narrative    Retired from [employer].  on 10/14/2019 after 72 years of marriage.     Family History   Problem Relation Age of Onset   • Heart Disease Mother    • Heart Attack Mother    • Heart Disease Father    • Heart Attack Father    • No Known Problems Maternal Grandmother    • No Known Problems Maternal Grandfather    • No Known Problems Paternal Grandmother    • No Known Problems Paternal Grandfather      ROS:   Constitutional:  Negative for fever, chills, unexpected weight change, night sweats, body aches, sleep issues, and fatigue/generalized weakness.   HEENT: Positive for bilateral hearing loss. Negative for headaches, vision changes, ear pain, tinnitus, ear discharge, rhinorrhea, sinus congestion, sneezing, sore throat, and neck pain.    Respiratory:  Negative for cough, shortness of breath, sputum production, hemoptysis, chest congestion, dyspnea, wheezing, and crackles.    Cardiovascular: Positive BLE edema and weeping. Negative for chest pain, palpitations, SMITH, paroxsymal nocturnal dyspnea, orthopnea.   Gastrointestinal:  Negative for heartburn, nausea, vomiting, abdominal pain, hematochezia, melena, diarrhea, constipation, and greasy/foul-smelling stools.   Genitourinary: Positive for urinary incontinence and scrotal edema. Negative for dysuria, nocturia, polyuria, hematuria, pyuria, urinary urgency, urinary frequency.   Musculoskeletal:  Negative for myalgias, back pain, and joint pain.   Skin:  "Positive for right forearm skin tear healing, BLE redness, weeping, and itching. Negative for rash, sores, lumps, cyanotic skin color change.   Neurological: Negative for dizziness, tingling, tremors, focal sensory deficit, focal weakness and headaches.   Endo/Heme/Allergies: Patient is on chronic anticoagulation with coumadin. Denies cold/heat intolerance.   Psychiatric/Behavioral: Positive for memory loss. Negative for depression, suicidal/homicidal ideation.        Exam: /70 (BP Location: Left arm, Patient Position: Sitting, BP Cuff Size: Adult)   Pulse 73   Temp 36.4 °C (97.6 °F) (Temporal)   Ht 1.676 m (5' 6\")   Wt 77.6 kg (171 lb)   SpO2 90%  Body mass index is 27.6 kg/m².    General:  Normal appearing. No distress.  HEENT: Pupils reactive to light, unequal Left 3 Right 2. Bilateral baseline lid ptosis. Normocephalic. Eyes conjunctiva clear, ears normal shape and contour, bilateral hearing aides in place, nasal mucosa benign, oropharynx is without erythema, edema or exudates.   Neck:  Supple without JVD or bruit.  Pulmonary:  Clear to ausculation.  Normal effort. No rales, ronchi, or wheezing.  Cardiovascular: Murmur heard. BLE generalized edema 3+ pitting with weeping. Scrotal edema improving.  Regular rate and rhythm. Carotid and radial pulses are intact and equal bilaterally.  Abdomen:  Soft, nontender, nondistended. Normal bowel sounds.  Neurologic:  Grossly nonfocal.  Skin: BLE hemosiderin deposition. Warm and dry.   Musculoskeletal: Unsteady gait, uses walker for mobility. No extremity cyanosis, clubbing, or edema.  Psych:  Normal mood and affect. Alert and oriented x3. Judgment and insight is normal.    Assessment/Plan:  1. Bilateral lower extremity edema  2. Cellulitis of lower extremity, unspecified laterality  Lower extremity cellulitis improving from urgent care visit on 1/16/2020.  Antibiotic course Keflex from urgent care to in tomorrow, since improving will prescribe another round of " Keflex.  Patient currently still taking furosemide 20 mg daily along with potassium 20 mEq.  Will obtain culture of right lower extremity cellulitis open area.  Keep appointment cardiology on 1/24/2020.  Keep appointment with wound care on 1/30/2020.  - ANAEROBIC/AEROBIC/GRAM STAIN  - potassium chloride SA (KDUR) 20 MEQ Tab CR; Take 1 Tab by mouth every day.  Dispense: 90 Tab; Refill: 1  - cephALEXin (KEFLEX) 500 MG Cap; Take 1 Cap by mouth 3 times a day for 7 days.  Dispense: 21 Cap; Refill: 0    Educated in proper administration of medication(s) ordered today including safety, possible SE, risks, benefits, rationale and alternatives to therapy.   Supportive care, differential diagnoses, and indications for immediate follow-up discussed with patient.    Pathogenesis of diagnosis discussed including typical length and natural progression.    Instructed to return to clinic or nearest emergency department for any change in condition, further concerns, or worsening of symptoms.  Patient states understanding of the plan of care and discharge instructions.    Return if symptoms worsen or fail to improve.    Please note that this dictation was created using voice recognition software. I have made every reasonable attempt to correct obvious errors, but I expect that there are errors of grammar and possibly content that I did not discover before finalizing the note.

## 2020-01-24 NOTE — PROGRESS NOTES
Cardiology Clinic Follow-up Note    Date of note:    1/24/2020    Primary Care Provider: PRICE Vaca    Name:             Primitivo Milan  YOB: 1921  MRN:               [id number]    CC: LE swelling    Primary Cardiologist: Dr. Marshall    Patient HPI:   Primitivo Milan is a 89 y.o. male with PMH including hx of diastolic dysfunction, HTN, permanent AFIB on warfarin and diltiazem.  He had hx of high grade AVB s/p Medtronic PPM in 2009.  He had hx of lymphoma and prostate cancer.  He has CKD stage III-IV.  He has at least moderate AS, hx of TIA and MAIKEL - not on CPAP.     Interim History:  Mr. Milan was last seen in this cardiology office by Yvette Ramirez on 1/10/2020.  At that time he had very swollen, weeping BLE.  Recently treated for RLE cellulitis, on Keflex.  She increased lasix from 20 mg to 40 BID and K 20 mEq daily.  He did have BMP and proBNP on 1/21/20.    Patient is here for follow up.     His weight is down at home.  O2 improved, was in the high 80s per his records.  His weight is down from approx 170 to 166.  His daughter helps him keep good home records.     I reviewed his BMP from 1/21, Cr increased from 1.6 to 2.1 (has been chronically in that range).  He is still making good, clear urine out put at home.  He notes his LE swelling is somewhat improved, though now the RLE is weeping more.  He has appt with wound care.  He also notes his scrotal edema is improved.   He seems a little breathless with conversation.   Also tells me about orthopnea.      Review of Systems   Constitution: Negative for decreased appetite, diaphoresis, fever and malaise/fatigue.   Eyes: Negative for blurred vision.   Cardiovascular: Positive for dyspnea on exertion, leg swelling and orthopnea. Negative for chest pain, near-syncope, palpitations and syncope.   Respiratory: Positive for shortness of breath. Negative for snoring.    Hematologic/Lymphatic: Negative for bleeding problem.  Bruises/bleeds easily.   Skin: Negative for rash.   Musculoskeletal: Negative for joint pain and myalgias.   Gastrointestinal: Positive for bloating. Negative for abdominal pain, nausea and vomiting.   Genitourinary: Positive for frequency.   Neurological: Negative for dizziness and weakness.       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 Trace MR, moderate AS (peak 40mmHg, mean 23mmHg), no AI. Moderate TR. RVSP 45mmHg.   • Other dyspnea and respiratory abnormality    • Other malignant lymphomas, unspecified site, extranodal and solid organ sites    • Paroxysmal atrial fibrillation (HCC)    • Permanent atrial fibrillation - since 5/2019    • Personal history of malignant neoplasm of prostate    • Personal history of venous thrombosis and embolism    • Pneumonia due to infectious organism 12/11/2018   • Presence of permanent cardiac pacemaker 09/2009    Medtronic Versa [id number] implanted by Dr. Lexa Lopes.    • Prostate cancer (HCC) 1991    Treated with surgery   • Renal disorder 1970    stones   • Scalp laceration 4/17/2019   • Sleep apnea     Cannot use CPAP machine.   • Stroke (HCC) 1991   • TIA (transient ischemic attack)    • Trauma 4/17/2019       Family History   Problem Relation Age of Onset   • Heart Disease Mother    • Heart Attack Mother    • Heart Disease Father    • Heart Attack Father    • No Known Problems Maternal Grandmother    • No Known Problems Maternal Grandfather    • No Known Problems  "Paternal Grandmother    • No Known Problems Paternal Grandfather        Social History     Socioeconomic History   • Marital status:    Tobacco Use   • Smoking status: Never Smoker   • Smokeless tobacco: Never Used   Substance and Sexual Activity   • Alcohol use: No     Alcohol/week: 0.0 oz   • Drug use: No   • Sexual activity: Never     Partners: Female     Comment:  72 years 6/15   Lifestyle   • Physical activity:   Social History Narrative    Retired from [employer].  on 10/14/2019 after 72 years of marriage.       Current Outpatient Medications   Medication Sig Dispense Refill   • potassium chloride SA (KDUR) 20 MEQ Tab CR Take 1 Tab by mouth every day. 90 Tab 1   • cephALEXin (KEFLEX) 500 MG Cap Take 1 Cap by mouth 3 times a day for 7 days. 21 Cap 0   • furosemide (LASIX) 20 MG Tab Take 40 mg by mouth 2 times a day. 60 Tab 11   • DILTIAZem CD (CARDIZEM CD) 240 MG CAPSULE SR 24 HR Take 1 Cap by mouth every day. 90 Cap 3   • warfarin (COUMADIN) 5 MG Tab TAKE 1-1.5 TABLETS BY MOUTH OR AS DIRECTED BY COUMADIN CLINIC (Patient taking differently: Take 5 mg by mouth every day. Take 1-1.5 tablets by mouth or as directed by Coumadin Clinic) 135 Tab 1   • Cholecalciferol (VITAMIN D3) 5000 units Tab Take 5,000 Units by mouth every day at 6 PM.     • Magnesium 400 MG Tab Take 400 mg by mouth every day.     • triamcinolone acetonide (KENALOG) 0.1 % Cream Apply 1 g to affected area(s) 2 times a day as needed. (Patient not taking: Reported on 1/16/2020) 1 Tube 11     No current facility-administered medications for this visit.        Allergies   Allergen Reactions   • Iodine          Physical Exam:  Ambulatory Vitals  /70 (BP Location: Left arm, Patient Position: Sitting)   Pulse 64   Ht 1.676 m (5' 6\")   Wt 76.2 kg (168 lb)   SpO2 94%    BP Readings from Last 4 Encounters:   01/24/20 154/70   01/23/20 122/70   01/16/20 122/78   01/10/20 138/70       Weight/BMI: Body mass index is 27.12 " kg/m².  Wt Readings from Last 4 Encounters:   01/24/20 76.2 kg (168 lb)   01/23/20 77.6 kg (171 lb)   01/16/20 77.6 kg (171 lb)   01/10/20 77.6 kg (171 lb 1.2 oz)       General: no apparent distress  Eyes: normal conjunctiva  ENT: OP clear, hard of hearing.  Lungs: normal respiratory effort, with bibasilar crackles, appears a little dyspneic with conversation, but chats away.  Heart: normal rate, irregular rhythm, not a clear S2, 3/6 systolic murmur loudest at the LUSB, no rubs or gallops.    Ext:  2+ edema bilateral lower extremities. Distal fingers bilaterally are cooler than hands and slightly cyanotic - concerning for decreased perfusion.  Abdomen: soft, non tender, + distended,  Neurological: No focal deficits, no facial asymmetry.  Normal speech.  Psychiatric: Appropriate affect, alert and oriented x 3. Loses his thought on occasion, but quickly recovers - impressive really.  Skin:  no rash. Open weeping lesions to the R pretibial area, scabbed areas to the L pretibial.      Lab Data Review:  Lab Results   Component Value Date/Time    CHOLSTRLTOT 216 (H) 12/29/2012 09:08 AM     (H) 12/29/2012 09:08 AM    HDL 65 12/29/2012 09:08 AM    TRIGLYCERIDE 96 12/29/2012 09:08 AM       Lab Results   Component Value Date/Time    SODIUM 142 01/21/2020 03:41 PM    POTASSIUM 4.4 01/21/2020 03:41 PM    CHLORIDE 107 01/21/2020 03:41 PM    CO2 25 01/21/2020 03:41 PM    GLUCOSE 123 (H) 01/21/2020 03:41 PM    BUN 46 (H) 01/21/2020 03:41 PM    CREATININE 2.10 (H) 01/21/2020 03:41 PM     Lab Results   Component Value Date/Time    ALKPHOSPHAT 75 10/09/2019 03:23 AM    ASTSGOT 30 10/09/2019 03:23 AM    ALTSGPT 14 10/09/2019 03:23 AM    TBILIRUBIN 1.7 (H) 10/09/2019 03:23 AM      Lab Results   Component Value Date/Time    WBC 11.1 (H) 10/10/2019 02:09 AM         Cardiac Imaging and Procedures Review:      Echo 10/1/18:  CONCLUSIONS  Compared to the images of the study done 1/7/2014 - there has been   development of moderate  aortic stenosis.      Normal left ventricular systolic function.  Left ventricular ejection fraction is visually estimated to be 60%.  Grade II diastolic dysfunction.  The right ventricle was normal in size and function.  Moderate aortic stenosis.  Transvalvular gradients are - Peak:  40mmHg, Mean:  23mmHg.  Moderate tricuspid regurgitation.  Estimated right ventricular systolic pressure is 45 mmHg.    Renal US 14:  FINDINGS:  The right kidney measures 11cm.  The left kidney measures 18.8cm. A large sonographically solid mass is again seen arising from the upper pole of the left kidney which today measures 11 x 9.8 x 11.7 CM, somewhat larger than before. An approximate 1 cm simple cyst is again seen laterally in the right kidney, without significant change. No hydronephrosis or nephrolithiasis. The bladder is grossly normal appearance.    1.  Slight interval increase in size of a large solid mass involving the upper pole of the left kidney. This remains consistent with renal cell carcinoma.  2.  Stable simple cyst of the right kidney.  3.  No hydronephrosis.      Assessment and Clinical Decision Makin.  Acute congestive heart failure.  Historically preserved EF, however, s/sx suggest decreased peripheral perfusion, concerning for either reduced systolic function or severe AS.  Patient was taking 40 mg lasix only in the AM, per daughter, improved, but still quite volume overloaded.    *  Will continue lasix 40 mg AM, K 20 mEq daily.   *  Monitor Urine Out Put closely - he knows to alert us with reduced UOP or dark urine.  *  Reduced sodium diet.  *  Repeat Echo - I think this can help us guide treatment and prognosis    2.  At least moderate AS. Concern for advancement in stenosis vs. Reduced EF.  Discuss benefits or repeat echo, pt and daughter are agreeable.    3.  CKD stage III-IV.  Cr from 1.6 to 2.1.  Will repeat BMP in 2 weeks.    4.  Multiple malignancies, hx of L eye cancer s/p radiation, hx of  prostate CA in 1991.  He also has known L renal mass concerning for RCC which was never removed.     5.  Permanent Afib. on Dilt and warfarin.    6.  High grade AVB s/p Medtronic PPM     7.  Hx of TIA.      8.  Weeping lesions to the RLE, scabbed areas to the LLE.  Seeing wound care.    Follow up with me in 2 weeks.  Call sooner with concerns.    Future Appointments   Date Time Provider Department Center   1/27/2020  2:15 PM [facility] ECHO 1 [facility] Conrad   1/28/2020  4:00 PM [facility]   1/30/2020  9:15 AM PRICE Ledbetter [facility] [address]   2/10/2020 10:45 AM Zeina Orourke P.A.-C. [facility] None   6/11/2020 12:45 PM PACER CHECK-CAM B [facility] None   6/11/2020  1:15 PM Bentley Marshall M.D. [facility] None   6/29/2020 11:20 AM PRICE Vaca [facility] JESSICA Orourke PA-C  1/24/2020    [facility]  [facility], [address]  [address] [address]  Phone: [phone]  Fax: [phone]    Collaborating Physician: Dr. Marshall.

## 2020-01-24 NOTE — ASSESSMENT & PLAN NOTE
Chronic in nature.  Currently 3+ pitting edema to bilateral lower extremities.  Patient still currently taking furosemide 40 mg daily, per cardiology recommendations.  Has an appointment with cardiology tomorrow.  He states that sometimes he feels like there is needles poking in his legs.  Still currently has scrotal edema, but improved from previous assessment.  Daughter states that he has some abdominal edema, when he leans forward he does become short of breath.

## 2020-01-24 NOTE — ASSESSMENT & PLAN NOTE
New problem to examiner.  He was seen in the urgent care on 1/16/2020, for cellulitis to right lower extremity has it has started to weep at home.  Patient was started on antibiotic course of Keflex.  He states that it is not weeping as much.  Daughter also confirms that it is improving.  Attached picture from 1/15/2020.  Wound has improved compared to picture.  Has an appointment with wound care on 1/30/2020.

## 2020-01-27 NOTE — RESULT ENCOUNTER NOTE
Please call the patients daughter and let her know that the wound culture came back positive for Morganella morganii which is not sensitive to the antibiotic that he is currently taking.  Please discontinue the Keflex.  A new antibiotic, Bactrim is going to be sent to the pharmacy at [facility] on [address].  Primitivo will take 1 pill twice a day for 10 days.  This antibiotic can affect INR with Coumadin/warfarin, please make sure the pharmacist knows that this medication has been added at the pharmacist appointment on 1/20/2020.    Thank you,    BRITNEY Vaca.

## 2020-01-27 NOTE — PROGRESS NOTES
1. Cellulitis of lower extremity, unspecified laterality  - sulfamethoxazole-trimethoprim (BACTRIM DS) 800-160 MG tablet; Take 1 Tab by mouth 2 times a day for 10 days.  Dispense: 20 Tab; Refill: 0

## 2020-01-27 NOTE — TELEPHONE ENCOUNTER
Lyndsay, please let him know his echo now shows  More tightening of his valve, he should see me to discuss in person, please move his appointment up to my next available      FYI to SC and  thanks for helping out, we'll have to see what he wants to do     It is my pleasure to participate in the care of Mr. Milan.  Please do not hesitate to contact me with questions or concerns.    Bentley Marshall MD PhD [facility]  Cardiologist [facility] V[facility]    Please note that this dictation was created using voice recognition software. I have worked with consultants from the vendor as well as technical experts from [facility] to optimize the interface. I have made every reasonable attempt to correct obvious errors, but I expect that there are errors of grammar and possibly content I did not discover before finalizing the note.

## 2020-01-28 PROBLEM — I34.0 MODERATE MITRAL REGURGITATION: Status: ACTIVE | Noted: 2020-01-01

## 2020-01-28 PROBLEM — L03.114 CELLULITIS OF LEFT HAND: Status: ACTIVE | Noted: 2020-01-01

## 2020-01-28 NOTE — TELEPHONE ENCOUNTER
I just spoke with his daughter on phone and discussed the results.    We talked about continued medical therapy and I have a follow up with him in 1-2 weeks.  I will discuss more with him and daughter at that time.  His swelling is improving, but worried about renal fcn and following closely.

## 2020-01-28 NOTE — TELEPHONE ENCOUNTER
Primitivo's daughter has called and stated that one of his hands is painful, swollen and hot to the touch. I told her those are symptoms of a blood clot and she should be safe and head to the emergency room.

## 2020-01-28 NOTE — TELEPHONE ENCOUNTER
Patient started on bactrim.  LM with patient's daughter to have him HOLD dosing tomorrow morning in anticipation of DDI between warfarin and bactrim.  Appointment in place in clinic tomorrow.  Sánchez Ochoa, MarionD, BCACP

## 2020-01-28 NOTE — ED NOTES
Med rec updated and complete. Allergies reviewed. Met with pt/family at bedside. Pt stated that he takes all his medications in the morning including his coumadin. Pt took 2.5 mg on coumadin on 01/28/20 at 0930.  Pt is currently on cephalexin. Daughter stated this is the second course of cephalexin.  Pt was given a prescription for Bactrim DS but pt has not started it. Daughter stated she has not even picked it up.      Home pharmacy [facility] jeff.

## 2020-01-28 NOTE — TELEPHONE ENCOUNTER
Upon PAC availability, there is an opening for tomorrow 01/29/2020.    Called pt daughter, Hollie, and offered pt to be seen earlier.  She states pt is being admitted today at the hospital because he had swelling in his hand.  She was told it was an infections so he will be receiving fluids overnight.  Encourage Hollie to call [phone] when he is d/c from the hospital to reschedule at an earlier day as there are openings prior to 2/10.  She verbalizes understanding and states no other concerns or questions at this time.  She is appreciative of information given.    ---------------------  Patient Call   BRITNEY Reeves.  Bentley Marshall M.D.; Negrita Schulte R.N. 5 hours ago (7:51 AM)      He is very frail and I'm not sure if he would do well post TAVR but you can have this discussion with him and his daughter. His daughter does most of his medications and care at this point. Thanks for the heads up. Let us know what his decision is.     Saadia

## 2020-01-28 NOTE — TELEPHONE ENCOUNTER
Spoke with patient's daughter, Hollie, regarding echo results as follows:    CONCLUSIONS  Hyperdynamic left ventricular systolic function.  Left ventricular ejection fraction is visually estimated to be greater   than 75%.  Grade III diastolic dysfunction.  The right ventricle was normal in size and function.  Severe aortic stenosis.  Moderate tricuspid regurgitation.  Estimated right ventricular systolic pressure  is 60 mmHg.  Compared to the images of the study done 10/1/18 - there has been   interval progression of aortic stenosis, now severe.     He probably is not a candidate, nor would he be agreeable to TAVR.  Patient will continue current course of diuretics.  Has BMP this week.  Will follow on results.    Daughter notes his PCP changed his abx to a sulfa drug.  Will need to assure close monitoring of renal function.    Zeina Orourke PA-C  1/28/2020

## 2020-01-28 NOTE — ED TRIAGE NOTES
Amb to triage w/ c/o L hand pain/redness/swelling/warm to touch x 1 day.  Currently being treated for cellulitis to BLE.  Pt also c/o exertional sob, 87% on RA, 95% on 6L.  Denies chest pain.

## 2020-01-28 NOTE — ED PROVIDER NOTES
ED Provider Note    Scribed for Juhi Mason M.D. by Angelica Ruggiero. 1/28/2020  12:31 PM    Primary care provider: PRICE Vaca  Means of arrival: Walk In  History obtained from: Patient  History limited by: None    CHIEF COMPLAINT  Chief Complaint   Patient presents with   • Arm Swelling   • Arm Pain   • Shortness of Breath       HPI  Primitivo Milan is a 89 y.o. male who presents to the Emergency Department accompanied by his daughter for evaluation of ongoing bilateral upper and lower extremity pain, swelling and erythema. The patient is currently taking a course of antibiotics, and the daughter states they plan on switching to Sulfa after a culture was taken yesterday. The patient's daughter states he has also been taking Lasix for the leg swelling.  Patient also has dyspnea and dizziness on extertion, but denies any chest pain, abdominal pain, fever, coughing, or increased shortness of breath compared to baseline. He had an echocardiogram done yesterday. Patient is anticoagulated with Coumadin. He denies smoking.     REVIEW OF SYSTEMS  CARDIAC: no chest pain, no palpitations    PULMONARY: Dyspnea on exertion, no dyspnea at rest, cough, or congestion   GI: no vomiting, diarrhea, or abdominal pain   Endocrine: no fevers, sweating, or weight loss   Musculoskeletal: swelling and pain to upper and lower bilateral extremities, no joint swelling  Neuro:  Dizziness on exertion  SKIN: upper and lower bilateral extremity pain, swelling, and erythema, no contusions     See history of present illness. All other systems are negative. C.    PAST MEDICAL HISTORY   has a past medical history of Anticoagulation monitoring, special range, Atrial fibrillation (HCC), Atrioventricular block, complete (HCC), Bell's palsy, Cancer (HCC) (2001), Essential hypertension, Extranodal lymphoma (HCC), History of TIA (transient ischemic attack) and stroke, TIA (transient ischemic attack), Kidney mass, Laceration of left  ring finger (4/17/2019), Moderate aortic stenosis (10/2018), Other dyspnea and respiratory abnormality, Other malignant lymphomas, unspecified site, extranodal and solid organ sites, Paroxysmal atrial fibrillation (HCC), Permanent atrial fibrillation - since 5/2019, Personal history of malignant neoplasm of prostate, Personal history of venous thrombosis and embolism, Pneumonia due to infectious organism (12/11/2018), Presence of permanent cardiac pacemaker (09/2009), Prostate cancer ([facility]) (1991), Renal disorder (1970), Scalp laceration (4/17/2019), Sleep apnea, Stroke ([facility]) (1991), TIA (transient ischemic attack), and Trauma (4/17/2019).    SURGICAL HISTORY   has a past surgical history that includes lithotripsy (1995); prostatectomy, radical retro (1991); eye surgery (2006); recovery (5/19/2011); eye surgery; and pacemaker insertion (September 2009).    SOCIAL HISTORY  Social History     Tobacco Use   • Smoking status: Never Smoker   • Smokeless tobacco: Never Used   Substance Use Topics   • Alcohol use: No     Alcohol/week: 0.0 oz   • Drug use: No      Social History     Substance and Sexual Activity   Drug Use No       FAMILY HISTORY  Family History   Problem Relation Age of Onset   • Heart Disease Mother    • Heart Attack Mother    • Heart Disease Father    • Heart Attack Father    • No Known Problems Maternal Grandmother    • No Known Problems Maternal Grandfather    • No Known Problems Paternal Grandmother    • No Known Problems Paternal Grandfather        CURRENT MEDICATION  Current Outpatient Medications on File Prior to Encounter   Medication Sig Dispense Refill   • sulfamethoxazole-trimethoprim (BACTRIM DS) 800-160 MG tablet Take 1 Tab by mouth 2 times a day for 10 days. 20 Tab 0   • potassium chloride SA (KDUR) 20 MEQ Tab CR Take 1 Tab by mouth every day. 90 Tab 1   • furosemide (LASIX) 20 MG Tab Take 40 mg by mouth 2 times a day. 60 Tab 11   • DILTIAZem CD (CARDIZEM CD) 240 MG CAPSULE SR 24 HR Take 1  "Cap by mouth every day. 90 Cap 3   • warfarin (COUMADIN) 5 MG Tab TAKE 1-1.5 TABLETS BY MOUTH OR AS DIRECTED BY COUMADIN CLINIC (Patient taking differently: Take 5 mg by mouth every day. Take 1-1.5 tablets by mouth or as directed by Coumadin Clinic) 135 Tab 1   • Cholecalciferol (VITAMIN D3) 5000 units Tab Take 5,000 Units by mouth every day at 6 PM.     • Magnesium 400 MG Tab Take 400 mg by mouth every day.          ALLERGIES  Allergies   Allergen Reactions   • Iodine      Reports family hx       PHYSICAL EXAM  VITAL SIGNS: /91   Pulse 87   Temp 36.3 °C (97.3 °F) (Oral)   Resp (!) 22   Ht 1.676 m (5' 6\")   Wt 77.3 kg (170 lb 6.7 oz)   SpO2 95%   BMI 27.51 kg/m²     Constitutional: Well developed, Well nourished, No acute distress, Non-toxic appearance.   HEENT: Normocephalic, Atraumatic,  external ears normal, pharynx pink,  Mucous  Membranes moist, No rhinorrhea or mucosal edema  Eyes: PERRL, EOMI, Conjunctiva normal, No discharge.   Neck: Normal range of motion, No tenderness, Supple, No stridor.   Lymphatic: No lymphadenopathy    Cardiovascular: Irregular Rate and Irregular Rhythm, 2/6 systolic murmurs, no rubs or gallops.   Thorax & Lungs: Lungs clear to auscultation bilaterally, No respiratory distress, No wheezes, rhales or rhonchi, No chest wall tenderness.   Abdomen: Bowel sounds normal, Soft, non tender, non distended,  No pulsatile masses., no rebound guarding or peritoneal signs.   Skin: Warm, Dry  Back:  No CVA tenderness,  No spinal tenderness, bony crepitance, step offs, or instability.   Neurologic: Alert & oriented x 3, Normal motor function, Normal sensory function, No focal deficits noted. Normal reflexes. Normal Cranial Nerves.  Extremities: BLE: erythema and weeping blisters, +1 edema. L hand is erythematous, puffy, warm, and tender almost up to the elbow, Equal, intact distal pulses, No cyanosis, clubbing or edema,   Musculoskeletal: Good range of motion in all major joints. No " major deformities noted.       DIAGNOSTIC STUDIES / PROCEDURES    LABS  Results for orders placed or performed during the hospital encounter of 01/28/20   Lactic acid (lactate)   Result Value Ref Range    Lactic Acid 1.8 0.5 - 2.0 mmol/L   CBC WITH DIFFERENTIAL   Result Value Ref Range    WBC 7.5 4.8 - 10.8 K/uL    RBC 4.86 4.70 - 6.10 M/uL    Hemoglobin 15.5 14.0 - 18.0 g/dL    Hematocrit 47.8 42.0 - 52.0 %    MCV 98.4 (H) 81.4 - 97.8 fL    MCH 31.9 27.0 - 33.0 pg    MCHC 32.4 (L) 33.7 - 35.3 g/dL    RDW 52.0 (H) 35.9 - 50.0 fL    Platelet Count 192 164 - 446 K/uL    MPV 10.2 9.0 - 12.9 fL    Neutrophils-Polys 79.30 (H) 44.00 - 72.00 %    Lymphocytes 8.90 (L) 22.00 - 41.00 %    Monocytes 10.40 0.00 - 13.40 %    Eosinophils 0.40 0.00 - 6.90 %    Basophils 0.70 0.00 - 1.80 %    Immature Granulocytes 0.30 0.00 - 0.90 %    Nucleated RBC 0.00 /100 WBC    Neutrophils (Absolute) 5.97 1.82 - 7.42 K/uL    Lymphs (Absolute) 0.67 (L) 1.00 - 4.80 K/uL    Monos (Absolute) 0.78 0.00 - 0.85 K/uL    Eos (Absolute) 0.03 0.00 - 0.51 K/uL    Baso (Absolute) 0.05 0.00 - 0.12 K/uL    Immature Granulocytes (abs) 0.02 0.00 - 0.11 K/uL    NRBC (Absolute) 0.00 K/uL   Comp Metabolic Panel   Result Value Ref Range    Sodium 137 135 - 145 mmol/L    Potassium 4.2 3.6 - 5.5 mmol/L    Chloride 101 96 - 112 mmol/L    Co2 26 20 - 33 mmol/L    Anion Gap 10.0 0.0 - 11.9    Glucose 87 65 - 99 mg/dL    Bun 46 (H) 8 - 22 mg/dL    Creatinine 2.00 (H) 0.50 - 1.40 mg/dL    Calcium 9.9 8.5 - 10.5 mg/dL    AST(SGOT) 44 12 - 45 U/L    ALT(SGPT) 17 2 - 50 U/L    Alkaline Phosphatase 89 30 - 99 U/L    Total Bilirubin 1.4 0.1 - 1.5 mg/dL    Albumin 3.8 3.2 - 4.9 g/dL    Total Protein 7.1 6.0 - 8.2 g/dL    Globulin 3.3 1.9 - 3.5 g/dL    A-G Ratio 1.2 g/dL   ESTIMATED GFR   Result Value Ref Range    GFR If  37 (A) >60 mL/min/1.73 m 2    GFR If Non  31 (A) >60 mL/min/1.73 m 2   Prothrombin Time   Result Value Ref Range    PT 45.8  (H) 12.0 - 14.6 sec    INR 4.65 (H) 0.87 - 1.13   EKG   Result Value Ref Range    Report       [facility] Emergency Dept.    Test Date:  2020  Pt Name:    SIXTO LUDWIG                Department: ER  MRN:        [id number]                      Room:  Gender:     Male                         Technician: [id number]  :        [id number]                   Requested By:ER TRIAGE PROTOCOL  Order #:    [id number]                    Reading MD: SHERIDAN SANTAMARIA MD    Measurements  Intervals                                Axis  Rate:       60                           P:          -79  FL:         454                          QRS:        -39  QRSD:       126                          T:          130  QT:         437  QTc:        437    Interpretive Statements  Ventricular-paced rhythm  No further analysis attempted due to paced rhythm  Compared to ECG 10/08/2019 21:12:30  Sinus rhythm no longer present  Intraventricular conduction delay no longer present  Myocardial infarct finding no longer present  Electronically Signed On 2020 13:44:56 PST by SHERIDAN SANTAMARIA MD        All labs reviewed by me.    EKG  12 lead EKG; Interpreted by emergency department physician as seen above.      RADIOLOGY  DX-CHEST-PORTABLE (1 VIEW)   Final Result         Small pleural effusions with bibasilar atelectasis, left more than right.      US-EXTREMITY VENOUS UPPER UNILAT LEFT   Final Result        The radiologist's interpretation of all radiological studies have been reviewed by me.    COURSE & MEDICAL DECISION MAKING  Nursing notes, VS, PMSFHx reviewed in chart.    Obtained and reviewed past medical records from which indicated the patient has history of severe aortic stenosis and is not a bypass or TAVR candidate. The patient has high output left ventricular heart failure.     12:31 PM Patient seen and examined at bedside. Discussed plan of care with patient. I informed them that labs and imaging will be ordered to evaluate  symptoms. Patient is understanding and agreeable with plan. Ordered DX chest, EKG, Blood culture x2, Urine culture, UA, CBC with diff, Lactic acid, CMP, and US extremity venous upper unilateral left. to evaluate his symptoms. The differential diagnoses include but are not limited to: Cellulitis, DVT, .    12:41 PM I discussed with pharmacy on medication recommendation.     1:05 PM Pharmacy recomended Rocephin. Patient will be treated with Rocephin 1 g in  mL IVPB. Intravenous fluids administered for antibiotics administration     1:44 PM-  Updated on plan of care. The patient and his daughter are understanding and agreeable with plan.    1:48 Paged Hospitalist.     2:00 PM I discussed the patient's case and the above findings [facility] who will [facility] the patient for hospitalization.      HYDRATION: Based on the patient's presentation of Other medication administration the patient was given IV fluids. IV Hydration was used because oral hydration was not adequate alone. Upon recheck following hydration, the patient was mildly improved..        DISPOSITION:  Patient will be hospitalized by Dr. Archer in guarded condition.    FINAL IMPRESSION  1. Cellulitis of left upper extremity    2. Severe aortic stenosis          Angelica OLIVEIRA (Ruiz), am scribing for, and in the presence of, Juhi Mason M.D..    Electronically signed by: Angelica Ruggiero (Ruiz), 1/28/2020    Juhi OLIVEIRA M.D. personally performed the services described in this documentation, as scribed by Angelica Ruggiero in my presence, and it is both accurate and complete. C    The note accurately reflects work and decisions made by me.  Juhi Mason M.D.  1/28/2020  6:43 PM

## 2020-01-28 NOTE — PROGRESS NOTES
Inpatient Anticoagulation Service Note    Date: 1/29/2020  Reason for Anticoagulation: Atrial Fibrillation   [id number] VASc Score: 5  HAS-BLED Score: 2     Hemoglobin Value: 15.5  Hematocrit Value: 47.8  Lab Platelet Value: 192  Target INR: 2.0 to 3.0    INR from last 7 days     Date/Time INR Value    01/28/20 1238  (!) 4.65        Dose from last 7 days     Date/Time Dose (mg)    01/29/20 0020  0        Average Dose (mg): (Home dosing = 5 mg MWF, 2.5 all other days)  Significant Interactions: Antibiotics  Bridge Therapy: No       Reversal Agent Administered: Not Applicable  Comments: Therapy with warfarin continued from home for history of atrial fibrillation. Baseline INR supratherapeutic. Home dosing regimen outlined above.     Plan:  With elevated INR,  will hold dosing and trend INR, planning to resume when clinically appropriate.     Education Material Provided?: No (Chronic warfarin therapy)    Pharmacist suggested discharge dosing: TBD, potentially continue home dosing of 5 mg MWF, 2.5 mg all other days    Randy SchultzD, BCPS

## 2020-01-29 NOTE — ASSESSMENT & PLAN NOTE
Patient's aortic stenosis has progressed.  Followed by Dr. Bentley Marshall as an outpatient.  Attempt to medically manage this is not an interventional candidate.  -cardiology consulted, recommended hospice, consult placed, hospice is planning to come today  -Patient has much worsening wheezing today, family did not want him to have breathing treatments as it makes his mental status worse

## 2020-01-29 NOTE — PROGRESS NOTES
Possible HF diagnosis, echo is completed and shows Grade III DD as well as now severe AS. Patient has been to cardiology office previously.    Awaiting further MD notes, none have posted since echo resulted.    Below are measures that must be addressed should the diagnosis be confirmed.    Daily Nurse: please begin to fill out the HF checklist (pink sheet in hard chart) and use it to guide your daily care.    Discharge Nurse: please ensure completeness of the HF checklist (pink sheet in hard chart) and have it co-signed by the charge RN before the patient leaves the hospital.    Thank you, [place], Cardiovascular Nurse Navigator, RN, CHFN [phone]    HF Measures:  1. Documentation of LV systolic function (echo or cath) PTA, during this hospitalization, or plan to assess post discharge or reason for not assessing documented  2. Documentation of fluid intake and urine output every nursing shift  3. 2 hour post diuretic assessment documented 2 hours after diuretic given  4. HF Patient Education using the Living Well With Heart Failure Booklet and Symptom Tracker documented every nursing shift  5. Nutrition consult for diet education  6. Daily weights (one weight documented every 24 hours) on a standing scale unless standing is contraindicated in which case bed scale can be used - have patient write weight on symptom tracker  7. For LVEF less than or equal to 40%, ACE-I, ARNI or ARB prescribed at discharge   8. For LVEF less than or equal to 40%, an Evidence Based Beta Blocker (bisoprolol, carvedilol, toprol xl) must be prescribed at discharge  9. For LVEF less than or equal to 35% aldosterone blockade prescribed at discharge  10. The combination of hydralazine and isosorbide dinitrate is recommended to reduce morbidity and mortality for patients self-described  Americans with NYHA class III-IV HFrEF (EF 40% or less), receiving optimal therapy with ACE inhibitors and beta blockers, unless contraindicated (Class I,  SIMONE: JEREMIAH).  11. Screening for and administering immunizations as long as no contraindications: Pneumonia (regardless of age) and Influenza  12. Written discharge instructions include:  ? Daily weights  ? Record weight on tracker  ? Bring tracker to appointments  ? Call MD for weight gain of 3lb /day or 5lb/week  ? HF medication teaching  ? Low sodium diet  ? Follow up appointment within seven calendar days of d/c must include: date, time and location  ? Activity  ? Worsening symptoms    What if any of the above HF measures are contraindicated?  ? Request that the discharging provider document the medication/intervention and the contraindication specifically in a progress note  ? For example: “no CHF meds due to hypotension” is not enough. It needs to say: “No ACE-I, ARNI, ARB due to hypotension”; “No Beta Blockade due to bradycardia”…       Treatment Team Sticky Notes   Comment   Expected hospice discharge.  If patient discharges with hospice, a 7 day HF follow up appointment is not indicated.   If patient does not discharge with hospice, he will require an appointment within seven calendar days (call [phone] to schedule) and completion of the HF Checklist (pink sheet in hard chart).    Many thanks, Georgia, Cardiovascular Nurse Navigator, RN, CHFN [phone], & [employer] M-F (excluding holidays).   Last edited by Georgia Hutchinson, R.N. on 01/30/20 at 1321

## 2020-01-29 NOTE — PROGRESS NOTES
Pt arrived to room; monitor placed and monitor room notified. Pt able to ambulate to bathroom with FWW and x1 assistance.

## 2020-01-29 NOTE — H&P
Hospital Medicine History & Physical Note    Date of Service  1/28/2020    Primary Care Physician  PRICE Vaca    Consultants      Code Status  DNR/I    Chief Complaint  Peripheral edema and shortness of breath    History of Presenting Illness  89 y.o. male who was a previous medical history that includes severe aortic stenosis, moderate mitral regurg, permanent A. fib, chronic anticoagulation on Coumadin, stage IV chronic kidney disease, distant history of lymphoma and prostate cancer.  He presents today with complaint of shortness of breath and peripheral edema.  He states symptoms began several days ago.  He notes that he is very short of breath when he exerts himself and also when he tries to lay flat at night.  He also notes pain and redness in his left hand.  This started yesterday.  He is unaware of any trauma though his daughter at the bedside states that she thinks the cat may have scratched or bitten his hand.    Review of Systems  Review of Systems   Constitutional: Negative for chills and fever.   HENT: Negative for nosebleeds and sore throat.    Eyes: Negative for blurred vision and double vision.   Respiratory: Positive for cough and shortness of breath.    Cardiovascular: Positive for leg swelling. Negative for chest pain and palpitations.   Gastrointestinal: Negative for abdominal pain, diarrhea, nausea and vomiting.   Genitourinary: Negative for dysuria and urgency.   Musculoskeletal: Negative for back pain.   Skin: Positive for rash.   Neurological: Negative for dizziness, loss of consciousness and headaches.       Past Medical History   has a past medical history of Anticoagulation monitoring, special range, Atrial fibrillation (HCC), Atrioventricular block, complete (HCC), Bell's palsy, Cancer (HCC) (2001), Essential hypertension, Extranodal lymphoma (HCC), History of TIA (transient ischemic attack) and stroke, TIA (transient ischemic attack), Kidney mass, Laceration of left ring  finger (4/17/2019), Moderate aortic stenosis (10/2018), Other dyspnea and respiratory abnormality, Other malignant lymphomas, unspecified site, extranodal and solid organ sites, Paroxysmal atrial fibrillation (HCC), Permanent atrial fibrillation - since 5/2019, Personal history of malignant neoplasm of prostate, Personal history of venous thrombosis and embolism, Pneumonia due to infectious organism (12/11/2018), Presence of permanent cardiac pacemaker (09/2009), Prostate cancer ([facility]) (1991), Renal disorder (1970), Scalp laceration (4/17/2019), Sleep apnea, Stroke ([facility]) (1991), TIA (transient ischemic attack), and Trauma (4/17/2019).    Surgical History   has a past surgical history that includes lithotripsy (1995); prostatectomy, radical retro (1991); eye surgery (2006); recovery (5/19/2011); eye surgery; and pacemaker insertion (September 2009).     Family History  family history includes Heart Attack in his father and mother; Heart Disease in his father and mother; No Known Problems in his maternal grandfather, maternal grandmother, paternal grandfather, and paternal grandmother.     Social History   reports that he has never smoked. He has never used smokeless tobacco. He reports that he does not drink alcohol or use drugs.    Allergies  Allergies   Allergen Reactions   • Iodine      Reports family hx       Medications  Prior to Admission Medications   Prescriptions Last Dose Informant Patient Reported? Taking?   Cholecalciferol (VITAMIN D3) 5000 units Tab 1/27/2020 at 0930 Patient Yes No   Sig: Take 5,000 Units by mouth every day at 6 PM.   DILTIAZem CD (CARDIZEM CD) 240 MG CAPSULE SR 24 HR 1/28/2020 at 0930 Patient No No   Sig: Take 1 Cap by mouth every day.   Magnesium 400 MG Tab 1/28/2020 at 0930 Patient Yes No   Sig: Take 400 mg by mouth every day.   cephALEXin (KEFLEX) 500 MG Cap 1/28/2020 at 0930 Patient Yes Yes   Sig: Take 500 mg by mouth 3 times a day.   furosemide (LASIX) 20 MG Tab 1/28/2020 at 0930  Patient Yes No   Sig: Take 40 mg by mouth every day.   potassium chloride SA (KDUR) 20 MEQ Tab CR 1/28/2020 at 0930 Patient No No   Sig: Take 1 Tab by mouth every day.   warfarin (COUMADIN) 5 MG Tab 1/28/2020 at 0930 Patient Yes Yes   Sig: Take 2.5-5 mg by mouth every day. 2.5 mg on Tuesday, Thursday, Saturday, Sunday  5 mg on Monday, Wednesday, Friday      Facility-Administered Medications: None       Physical Exam  Temp:  [36.3 °C (97.3 °F)] 36.3 °C (97.3 °F)  Pulse:  [51-87] 56  Resp:  [14-23] 16  BP: (123-149)/(69-91) 123/70  SpO2:  [87 %-95 %] 92 %    Physical Exam  Vitals signs reviewed.   Constitutional:       General: He is not in acute distress.     Appearance: He is well-developed. He is not diaphoretic.   HENT:      Head: Normocephalic and atraumatic.   Eyes:      Conjunctiva/sclera: Conjunctivae normal.   Neck:      Vascular: No JVD.   Cardiovascular:      Rate and Rhythm: Normal rate. Rhythm irregular.      Heart sounds: No murmur. No gallop.    Pulmonary:      Effort: Pulmonary effort is normal. No respiratory distress.      Breath sounds: No stridor. Wheezing and rales present.   Abdominal:      Palpations: Abdomen is soft.      Tenderness: There is no tenderness. There is no guarding or rebound.   Musculoskeletal:         General: No tenderness.      Right lower leg: Edema present.      Left lower leg: Edema present.      Comments: Patient has 2+ pitting edema up to the mid shin on both legs.  His left hand is swollen and edematous extending from the dorsum of the hand to the distal lower arm.  There is erythema on the dorsum of the hand.  The fingertips are warm and pink with good capillary refill.   Skin:     General: Skin is warm and dry.      Findings: No rash.   Neurological:      General: No focal deficit present.      Mental Status: He is oriented to person, place, and time. Mental status is at baseline.   Psychiatric:         Mood and Affect: Mood normal.         Thought Content: Thought  content normal.         Laboratory:  Recent Labs     01/28/20  1238   WBC 7.5   RBC 4.86   HEMOGLOBIN 15.5   HEMATOCRIT 47.8   MCV 98.4*   MCH 31.9   MCHC 32.4*   RDW 52.0*   PLATELETCT 192   MPV 10.2     Recent Labs     01/28/20  1238   SODIUM 137   POTASSIUM 4.2   CHLORIDE 101   CO2 26   GLUCOSE 87   BUN 46*   CREATININE 2.00*   CALCIUM 9.9     Recent Labs     01/28/20  1238   ALTSGPT 17   ASTSGOT 44   ALKPHOSPHAT 89   TBILIRUBIN 1.4   GLUCOSE 87     Recent Labs     01/28/20  1238   INR 4.65*     No results for input(s): NTPROBNP in the last 72 hours.      No results for input(s): TROPONINT in the last 72 hours.    Urinalysis:    No results found     Imaging:  DX-CHEST-PORTABLE (1 VIEW)   Final Result         Small pleural effusions with bibasilar atelectasis, left more than right.      US-EXTREMITY VENOUS UPPER UNILAT LEFT   Final Result            Assessment/Plan:  I anticipate this patient will require at least two midnights for appropriate medical management, necessitating inpatient admission.    Chronic anticoagulation- (present on admission)  Assessment & Plan  The patient is maintained on Coumadin for his history of atrial fibrillation  Continue per pharmacy protocol    Permanent atrial fibrillation- (present on admission)  Assessment & Plan  Continue diltiazem for rate control and warfarin for anticoagulation    Essential hypertension- (present on admission)  Assessment & Plan  The patient is currently maintained on diltiazem  Monitor and follow    Severe aortic stenosis- (present on admission)  Assessment & Plan  Patient's aortic stenosis has progressed.  Followed by Dr. Bentley Marshall as an outpatient.  Attempt to medically manage this is not an interventional candidate.    Moderate mitral regurgitation  Assessment & Plan  Patient is followed by Dr. Bentley Marshall.  He is felt not to be an interventional candidate    Cellulitis of left hand  Assessment & Plan  Patient has a cat, the daughter  states that she thinks he may have been scratched or bitten.  Check blood cultures  Start on Cleo nasal lid and ampicillin sulbactam  Follow clinically    Acute congestive heart failure (HCC)- (present on admission)  Assessment & Plan  Patient's most recent echo done as an outpatient demonstrates hyperdynamic left ventricular function with no severe aortic stenosis, and moderate mitral regurgitation.  I think likely the patient is heart failure flare at this time is due to his valvular disease.  Management will be very difficult given his stage IV chronic kidney disease and advanced age.  Admit to telemetry  Start IV Lasix  Continue diltiazem for rate control  Follow daily BMP  Discussed with the patient and his daughter at the bedside that this is a very difficult situation, and it is very possible that we may not have a good medical solution.  He has been evaluated for TAVR and felt not to be a candidate    Stage 4 chronic kidney disease (HCC)- (present on admission)  Assessment & Plan  This will be a tricky situation in terms of trying to diurese this patient without worsening his renal function.  Cautious IV Lasix and diuresis  Follow daily BMP and urine output  Follow daily ins and outs and daily weights      VTE prophylaxis: Warfarin

## 2020-01-29 NOTE — CARE PLAN
Problem: Respiratory:  Goal: Respiratory status will improve  Outcome: PROGRESSING AS EXPECTED  Note:   Pt down to 1L NC. RT protocol in place. Pt on . Oxygen titrated down as tolerated.      Problem: Urinary Elimination:  Goal: Ability to reestablish a normal urinary elimination pattern will improve  Outcome: PROGRESSING AS EXPECTED  Note:   Pt has urinal at bedside. Voiding adequately.

## 2020-01-29 NOTE — ASSESSMENT & PLAN NOTE
-remains volume overloaded, need to maintain patient's clinical status on a tight rope right now  -hospice consulted placed, no changes to medication regimen at this time, no other options  -continue gentle IV Lasix  -consulted cards  Continue diltiazem for rate control, EF is 75%  Follow daily BMP  -No appreciable medical improvement and doubt we will get any

## 2020-01-29 NOTE — PROGRESS NOTES
Monitor Summary:   0.20/0.12/0.44     Rhythm: SR 60-67 with BBB         Ectopy: PVC, trigem, coup, PAC

## 2020-01-29 NOTE — ASSESSMENT & PLAN NOTE
This will be a tricky situation in terms of trying to diurese this patient without worsening his renal function.  Cr continues to fluctuate, continue low dose IV lasix at this time, creatinine is slightly improved today but near his baseline  Follow daily BMP and urine output  Follow daily ins and outs and daily weights

## 2020-01-29 NOTE — ASSESSMENT & PLAN NOTE
Continue diltiazem  mg daily  -hold coumadin given elevated INR (even higher today with levels >4), check daily INR, adjust PRN  -no clear e/o active bleeding

## 2020-01-29 NOTE — PROGRESS NOTES
Bedside report received. Patient A&O x4. 1L NC. SR on the monitor. No complaints of pain at this time. POC discussed with patient. Patient verbalized understanding. Call light and belongings within reach. Bed locked and in lowest position, alarm and fall precautions in place.

## 2020-01-29 NOTE — PROGRESS NOTES
Inpatient Anticoagulation Service Note    Date: 1/29/2020    Reason for Anticoagulation: Atrial Fibrillation   Target INR: 2.0 to 3.0  [id number] VASc Score: 5  HAS-BLED Score: 2   Hemoglobin Value: 14.3  Hematocrit Value: 43  Lab Platelet Value: (!) 163    INR from last 7 days     Date/Time INR Value    01/29/20 0250  (!) 5.77    01/28/20 1238  (!) 4.65        Dose from last 7 days     Date/Time Dose (mg)    01/29/20 1440  0    01/29/20 0020  0        Average Dose (mg): (Home dosing = 5 mg MWF, 2.5 all other days)  Significant Interactions: Antibiotics  Bridge Therapy: No     Comments: INR up to 5.77. Will continue to hold warfarin.  Per Med Rec Tech note last warfarin dose was 2.5 mg on the morning of 1/28. Pt had been on Keflex as an outpt with a Bactrim prescription that was never started. Increased INR likely due to DDI between warfarin and Keflex. Will trend the INR.    Education Material Provided?: No(Chronic warfarin therapy)  Pharmacist suggested discharge dosing: TBD, potentially continue home dosing of 5 mg MWF, 2.5 mg all other days. Pt should have a follow up INR within 3 days of discjimmy Garcia, PharmD

## 2020-01-29 NOTE — ASSESSMENT & PLAN NOTE
The patient is maintained on Coumadin for his history of atrial fibrillation  Hold as mentioned above

## 2020-01-29 NOTE — DISCHARGE PLANNING
Anticipated Discharge Disposition: TBD    Action: Lsw met w/ pt and dtr to answer questions about insurance. Pt questioned if he was covered by [facility]. Dtr was a bit concern regarding pt's meds that were being given. She asked if steroids were being given because they apparently make pt hallucinate and angry which is not his baseline. Dtr stated that they had to leave AMA previous stays because previous med team were giving pt steroids which were causing episodes. Lsw told dtr if she had concerns she could possibly ask nurse due to Lsw's inability to answer question.     Barriers to Discharge: TBD    Plan: f/u w/ medical team.

## 2020-01-29 NOTE — PROGRESS NOTES
Uneven pupil sizes noted in morning assessment. No other deficits present. Pt INR 5.77 this morning. Dr. Lilly notified. STAT CT scan of head ordered.

## 2020-01-29 NOTE — DISCHARGE PLANNING
Anticipated Discharge Disposition: TBD    Action: Pt was on way to get a CT Scan. Lsw will attempt to complete assessment at a different time.     Barriers to Discharge: TBD    Plan: f/u w/ Pt and medical team.

## 2020-01-29 NOTE — ASSESSMENT & PLAN NOTE
Patient has a cat, the daughter states that she thinks he may have been scratched or bitten.  Check blood cultures-pending at this time  -discussed with DR zendejas, narrowed abx's to IV ancef, clinically appears a bit better

## 2020-01-29 NOTE — DISCHARGE PLANNING
Care Transition Team Assessment  NOK Dtr Hollie [phone]    Lsw completed assessment w/ pt at bedside. Pt was A/Ox4. Pt is a 89 yr old  male who lives in a one-story home in [place], [place]. The pt reports that upon d/c he will have sufficient support from his dtr and grandchildren. The pt states that he is independent w/ some ADLs such as bathing, dressing himself, eating. The pt does require assistance with driving, he has his dtr take him where he needs to. He also requires reminders w/ medications which his dtr sets for him. The pt does have difficulty w/ walking and uses a walker to get around. The pt reports that he has a pet cat that keeps him busy and active. The pt reports that he does receive 1600/mo from [facility]. The pt reports no Hx of SA or MH diagnoses.     Information Source  Orientation : Oriented x 4  Information Given By: Patient  Informant's Name: Primitivo  Who is responsible for making decisions for patient? : Patient    Readmission Evaluation  Is this a readmission?: No    Elopement Risk  Legal Hold: No  Ambulatory or Self Mobile in Wheelchair: Yes  Disoriented: No  Psychiatric Symptoms: None  History of Wandering: No  Elopement this Admit: No  Vocalizing Wanting to Leave: No  Displays Behaviors, Body Language Wanting to Leave: No-Not at Risk for Elopement  Elopement Risk: Not at Risk for Elopement    Interdisciplinary Discharge Planning  Does Admitting Nurse Feel This Could be a Complex Discharge?: Yes  Lives with - Patient's Self Care Capacity: Adult Children  Patient or legal guardian wants to designate a caregiver (see row info): No  Support Systems: Family Member(s), Friends / Neighbors  Housing / Facility: 1 Story House  Do You Take your Prescribed Medications Regularly: Yes  Able to Return to Previous ADL's: Yes  Mobility Issues: Yes  Prior Services: None  Assistance Needed: Unknown at this Time  Durable Medical Equipment: Walker    Discharge Preparedness  What is your plan after  discharge?: Uncertain - pending medical team collaboration, Home with help  What are your discharge supports?: Child, Other (comment)(Friends and family)  Prior Functional Level: Ambulatory, Needs Assist with Medication Management, Uses Walker  Difficulity with ADLs: Walking  Difficulty with ADLs Comment: Uses walker  Difficulity with IADLs: Driving, Managing medication, Shopping  Difficulity with IADL Comments: Dtr assists    Functional Assesment  Prior Functional Level: Ambulatory, Needs Assist with Medication Management, Uses Walker    Finances  Financial Barriers to Discharge: No  Prescription Coverage: Yes    Vision / Hearing Impairment  Vision Impairment : Yes  Right Eye Vision: Impaired  Left Eye Vision: Impaired  Hearing Impairment : Yes  Hearing Impairment: Both Ears, Hearing Device(s) Available  Does Pt Need Special Equipment for the Hearing Impaired?: No         Advance Directive  Advance Directive?: DPOA for Health Care  Durable Power of  Name and Contact : Erika Browne     Domestic Abuse  Have you ever been the victim of abuse or violence?: No  Physical Abuse or Sexual Abuse: No  Verbal Abuse or Emotional Abuse: No  Possible Abuse Reported to:: Not Applicable    Psychological Assessment  History of Substance Abuse: None  History of Psychiatric Problems: No  Non-compliant with Treatment: No  Newly Diagnosed Illness: No    Discharge Risks or Barriers  Discharge risks or barriers?: No    Anticipated Discharge Information  Anticipated discharge disposition: Discharge needs currently unknown, Home  Discharge Address: Home: [address] [place] [id number]  Discharge Contact Phone Number: Dtr [phone]

## 2020-01-29 NOTE — PROGRESS NOTES
Hospital Medicine Daily Progress Note    Date of Service  1/29/2020    Chief Complaint  89 y.o. male admitted 1/28/2020 with LUE cellulitis and acute on chronic CHF exacerbation    Hospital Course    see below      Interval Problem Update  CHF-diuresing somewhat well. Cr has not fluctuated much. Patient claims he does not use oxygen at home. Denies any obvious improvement in his symptoms. TELE shows BBB and frequent ectopy.     Noted asymmetrical pupils by nursing staff. CTH shows with no acute findings.  Noted supra therapeutic INR, no clear evidence of active bleeding at this time.     Consultants/Specialty  Cards    Code Status  Dnar/dni    Disposition  TELE    Review of Systems  Review of Systems   Constitutional: Positive for malaise/fatigue. Negative for chills and fever.   Respiratory: Positive for shortness of breath.    Cardiovascular: Positive for leg swelling. Negative for chest pain and palpitations.   Gastrointestinal: Negative for abdominal pain, nausea and vomiting.   Musculoskeletal: Positive for joint pain.   Neurological: Negative for dizziness and headaches.   All other systems reviewed and are negative.       Physical Exam  Temp:  [36.6 °C (97.9 °F)-37.2 °C (98.9 °F)] 36.6 °C (97.9 °F)  Pulse:  [56-62] 60  Resp:  [16-21] 17  BP: (115-143)/(64-74) 121/68  SpO2:  [90 %-95 %] 93 %    Physical Exam  Vitals signs and nursing note reviewed.   Constitutional:       General: He is not in acute distress.     Appearance: He is well-developed.   HENT:      Head: Normocephalic and atraumatic.      Ears:      Comments: Hearing aids  Very hard of hearing     Nose:      Comments: NC in place     Mouth/Throat:      Pharynx: No oropharyngeal exudate.   Eyes:      General:         Right eye: No discharge.         Left eye: No discharge.      Pupils: Pupils are equal, round, and reactive to light.   Neck:      Musculoskeletal: Normal range of motion and neck supple.      Thyroid: No thyromegaly.   Cardiovascular:       Rate and Rhythm: Normal rate and regular rhythm.      Heart sounds: Murmur present.   Pulmonary:      Effort: Pulmonary effort is normal.      Breath sounds: Rales present. No wheezing.      Comments: Somewhat poor aeration throughout  Abdominal:      General: Bowel sounds are normal.      Palpations: Abdomen is soft.      Tenderness: There is no tenderness.   Musculoskeletal: Normal range of motion.         General: No tenderness.      Right lower leg: Edema present.      Left lower leg: Edema present.      Comments: Scattered scabbing throughout B/L LE's   Skin:     General: Skin is warm and dry.      Findings: Bruising and erythema (cellulitis of the entire LUE forearm) present. No rash.      Comments: Thin senile purpura   Neurological:      Mental Status: He is alert and oriented to person, place, and time.      Cranial Nerves: No cranial nerve deficit.         Fluids    Intake/Output Summary (Last 24 hours) at 1/29/2020 1405  Last data filed at 1/29/2020 1300  Gross per 24 hour   Intake 480 ml   Output 625 ml   Net -145 ml       Laboratory  Recent Labs     01/28/20  1238 01/29/20  0250   WBC 7.5 10.2   RBC 4.86 4.40*   HEMOGLOBIN 15.5 14.3   HEMATOCRIT 47.8 43.0   MCV 98.4* 97.7   MCH 31.9 32.5   MCHC 32.4* 33.3*   RDW 52.0* 51.6*   PLATELETCT 192 163*   MPV 10.2 10.4     Recent Labs     01/28/20  1238 01/29/20  0250   SODIUM 137 140   POTASSIUM 4.2 4.2   CHLORIDE 101 104   CO2 26 24   GLUCOSE 87 95   BUN 46* 44*   CREATININE 2.00* 2.05*   CALCIUM 9.9 8.9     Recent Labs     01/28/20  1238 01/29/20  0250   INR 4.65* 5.77*               Imaging  CT-HEAD W/O   Final Result      1.  Cerebral atrophy.      2.  White matter lucencies most consistent with small vessel ischemic change versus demyelination or gliosis. 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      3.  Otherwise, Head CT without contrast with no acute findings. No evidence of acute cerebral infarction, hemorrhage or mass lesion.       DX-CHEST-PORTABLE (1 VIEW)   Final Result         Small pleural effusions with bibasilar atelectasis, left more than right.      US-EXTREMITY VENOUS UPPER UNILAT LEFT   Final Result           Assessment/Plan  Chronic anticoagulation- (present on admission)  Assessment & Plan  The patient is maintained on Coumadin for his history of atrial fibrillation  Hold as mentioned above    Permanent atrial fibrillation- (present on admission)  Assessment & Plan  Continue diltiazem  mg daily  -hold coumadin given elevated INR, check daily INR, adjust PRN  -no clear e/o active bleeding    Essential hypertension- (present on admission)  Assessment & Plan  The patient is currently maintained on diltiazem  Monitor and follow    Severe aortic stenosis- (present on admission)  Assessment & Plan  Patient's aortic stenosis has progressed.  Followed by Dr. Bentley Marshall as an outpatient.  Attempt to medically manage this is not an interventional candidate.  -cardiology consulted to help with medical management    Moderate mitral regurgitation- (present on admission)  Assessment & Plan  Patient is followed by Dr. Bentley Marshall.  He is felt not to be an interventional candidate    Cellulitis of left hand- (present on admission)  Assessment & Plan  Patient has a cat, the daughter states that she thinks he may have been scratched or bitten.  Check blood cultures-pending at this time  -continue zyvox (impaired kidney function) and unasyn, hopefully transition to oral soon  Follow clinically    Acute congestive heart failure (HCC)- (present on admission)  Assessment & Plan  -remains volume overloaded, need to maintain patient's clinical status on a tight rope right now  Management will be very difficult given his stage IV chronic kidney disease and advanced age, Cr remains ok  Admit to telemetry  -continue gentle IV Lasix  -consulted cards  Continue diltiazem for rate control, EF is 75%  Follow daily BMP  Discussed with the  patient and his daughter at the bedside that this is a very difficult situation, and it is very possible that we may not have a good medical solution.  He has been evaluated for TAVR and felt not to be a candidate    Stage 4 chronic kidney disease (HCC)- (present on admission)  Assessment & Plan  This will be a tricky situation in terms of trying to diurese this patient without worsening his renal function.  Cautious IV Lasix and diuresis-Cr is holding right now with decent uop, no changes at this time  Follow daily BMP and urine output  Follow daily ins and outs and daily weights       VTE prophylaxis: coumadin, holding right now for supra-therapeutic INR

## 2020-01-29 NOTE — PROGRESS NOTES
2 RADHA nayak check with [place] GARCIA. Pt's skin has some generalized bruising. L hand is red and swollen. LEs are red with partially healed wounds on them. Heals are boggy and slow to lele. Pt's ears are red and slow to lele. There is a callous on top of pt's L ear.

## 2020-01-30 PROBLEM — R41.0 DELIRIUM: Status: ACTIVE | Noted: 2020-01-01

## 2020-01-30 NOTE — DISCHARGE PLANNING
Hospital Care management Discharge Planning     Anticipated Discharge Disposition:  · Home on Hospice     Action:  · This RN CM met with pt and daughter at bedside to obtain Hospice choice.  · Pt and daughter are requesting a visit from [facility] for consultation but have not decided if hospice is their chosen path at this time.   · Per choice form, pt preference is as follows:  · (1) [facility].  · This RN CM faxed choice form to DANIEL Robbins.  · Fax confirmation received at [phone].     Barriers to Discharge:  · GOC discussion with MD, Palliative and Hospice.      Plan:  · Follow up with CCA and treatment team.

## 2020-01-30 NOTE — DISCHARGE PLANNING
Received Choice form at [phone]  Agency/Facility Name: [facility]  Referral sent per Choice form @ [phone]

## 2020-01-30 NOTE — PROGRESS NOTES
Pt INR and PT came back critical. Pt 59.5 and INr 6.44. MD paged and updated no new orders from Dr. Tamayo. Will continue to monitor

## 2020-01-30 NOTE — PALLIATIVE CARE
Palliative Care follow-up  Consult received and EMR reviewed; case discussed with BS RN noting family coming to BS today but uncertain of time. Call placed to dtr Hollie to coordinate a time to meet and explained the rolf of this RN. She states she would like to also have her sister present for the discussion but uncertain of her ability to meet today. Hollie is aware that PC is available every day and plan made for her to call this RN when time determined by she and her sister Griselda.       Plan: formal consult to follow once time determined by family    Thank you for allowing Palliative Care to support this patient and family. Contact [phone] for additional assistance, change in patient status, or with any questions/concerns.

## 2020-01-30 NOTE — PROGRESS NOTES
CNA took pt to the bathroom after pt using call light and asking. Pt became confused when they  saw roommate and thought he was in his bed. Pt took off oxygen and refused to get back in bed. Security called and came to bedside to deescalate situation. MD called and updated. MD ordered 1mg ativan IV.

## 2020-01-30 NOTE — CONSULTS
DATE OF SERVICE:  01/29/2020    CARDIOLOGY CONSULTATION    REQUESTING PHYSICIAN:  Bran Engle MD    REASON FOR CONSULTATION:  Congestive heart failure.    HISTORY OF PRESENT ILLNESS:  The patient is a 89-year-old male with multiple   medical problems admitted to [facility] with peripheral edema and   shortness of breath due to decompensated congestive heart failure.    The patient has significant past medical history of chronic atrial   fibrillation, moderate aortic stenosis, moderate mitral regurgitation, chronic   anticoagulation, stage IV kidney disease, distant history of lymphoma and   prostate cancer.    The patient had just been seen in cardiology clinic on 01/10/2020 for   bilateral lower extremity edema and weeping wounds, treated with increasing   diuretics with Lasix and wound care.  Subsequent echocardiogram now   shows severe aortic stenosis with left ventricular ejection   fraction 75%.    Currently, the patient denies chest pain or shortness of breath.  He does   complain of left arm pain, which is swollen and erythematous.    ALLERGIES:  TO IODINE.    MEDICATIONS:  1.  Cefazolin 2 g IV every 8 hours.  2.  Diltiazem  mg daily.  3.  Lasix 40 mg IV daily.  4.  Warfarin protocol.  5.  Potassium 20 mEq daily.    PAST SURGICAL HISTORY:  1.  Lithotripsy in 1995.  2.  Prostatectomy.  3.  Eye surgery in 2006.  4.  Pacemaker placement in 09/2009.    PAST MEDICAL HISTORY:  1.  See above.  2.  Bell's palsy.  3.  Hypertension.  4.  History of transient ischemic attack and stroke.  5.  Lymphoma.  6.  Prostate cancer in 1991.  7.  Stroke in 1991.  8.  Sleep apnea.    FAMILY HISTORY:  History of heart attack in both parents.    SOCIAL HISTORY:  Never smoked cigarettes.  Does not drink alcohol.    REVIEW OF SYSTEMS:  GENERAL:  Denies fevers.  He has had generalized weakness.  RESPIRATORY:  As above.  No cough or hemoptysis.  CARDIAC:  As above.  No syncope or palpitations.  GASTROINTESTINAL:   Denies nausea, vomiting, or diarrhea.  GENITOURINARY:  See above.  No dysuria or hematuria.  NEUROLOGIC:  As above.  No seizures.  ENDOCRINE:  No headaches.  EYES:  No acute visual disturbances.  SKIN:  As above.  MUSCULOSKELETAL:  No history of gout.    PHYSICAL EXAMINATION:  VITAL SIGNS:  Blood pressure is 122/68, pulse 60, and temperature 97.9.  GENERAL:  Frail, elderly gentleman, hard of hearing.  No respiratory distress,   but slightly tachypneic.  HEENT:  Extraocular movements are intact.  Nonicteric sclerae.  Oropharynx,   dry mucous membranes.  Bilateral hearing aids.  NECK:  Jugular venous pressure difficult to assess.  Trace carotid pulses.  No   appreciable bruits.  CHEST:  Pacemaker generator in place.  LUNGS:  Diminished breath sounds.  CARDIAC:  Regular rate and rhythm with systolic murmur.  No diastolic murmurs,   gallops, or rubs.  ABDOMEN:  Protuberant, questionable ascites, firm, and nontender.  No   guarding.  No appreciable hepatosplenomegaly, audible bruits, or palpable   stone masses.  EXTREMITIES:  The left upper arm is diffusely edematous and erythematous.    Both legs are minimally edematous with diffuse superficial abrasions and   wounds.  NEUROLOGIC:  No obvious lateralizing findings.  Cranial nerves are intact.  SKIN:  Ecchymosis.  See above.    LABORATORY DATA:  WBC is 10,000, hemoglobin 14, and platelets 163,000.    Sodium 140, potassium 4.2, BUN 44, creatinine 2.0, and glucose 95.    BNP is 1750.    INR is 5.7.    Prolactin is 0.54.    DIAGNOSTIC DATA:  EKG on 01/28/2020, ventricular paced rhythm, underlying   rhythm is atrial fibrillation, rate 60.    Chest x-ray on 01/28/2020, small pleural effusions and bilateral atelectasis,   left greater than right.    Echocardiogram on 01/27/2020 shows left ventricular ejection fraction of 75%   and severe aortic stenosis and moderately severe pulmonary hypertension 60   mmHg.    Bilateral lower extremity venous study shows no evidence of  superficial or   deep venous thrombosis.    Head CT scan on 01/29/2020 shows cerebral atrophy, small vessel disease, or   demyelination.    ASSESSMENT:  1.  Congestive heart failure severe biventricular secondary to severe aortic stenosis.  2.  Severe aortic stenosis.  3.  Atrial fibrillation, chronic.  4.  Elevated INR.  5.  Chronic kidney disease.  6.  Cellulitis, both legs and left arm.  7.  General severe functional debility.  8.  Permanent pacemaker, functioning normally.    RECOMMENDATION AND DISCUSSION:  1.  Symptomatic therapy for progressive worsening congestive heart failure   related to progressive now severe aortic stenosis and pulmonary hypertension   with biventricular heart failure.  2.  Recommend hospice consultation.  3.  Had a detailed discussion with the patient's daughter who was at the   bedside and agrees with hospice care.  Of note, the patient's wife of 72 years   recently passed away on hospice care in 10/2019.  4.  We will sign off.       ____________________________________     MD VEE FLOYD / ARLEI    DD:  01/29/2020 16:22:47  DT:  01/29/2020 16:46:51    D#:  [id number]  Job#:  [id number]

## 2020-01-30 NOTE — CONSULTS
Reason for PC Consult: Advance Care Planning    Consulted by: Dr. Engle    Assessment:  General: 89 y/o male from home with BLE edema, weepiness and SOB for several days. PMHx of aortic stenosis, moderate mitral regurg, permanent A. fib, chronic anticoagulation on Coumadin, stage IV chronic kidney disease, distant history of lymphoma and prostate cancer. ECHO demonstrates severe AS, moderate TR, and pulmonary HTN. Elevated and worsening BUN/CR 52/2.29 today.     Social: Pt lives alone in a one-story home with support from his daughters and granskids. He is able to manage some ADLs independently, like bathing, dressing, cooking, but requires help with medications, driving, appointments, and shopping. He is  and both daughters, Hollie and Griselda, live locally. He receives about $1600 from [email or web address].    Consults: cardiology    Dyspnea: No-    Last BM: 01/29/20-    Pain: No-    Depression:  -    Dementia: No;       Spiritual:  Is [employer] or spirituality important for coping with this illness? Unable to determine-    Has a  or spiritual provider visit been requested? Unable to determine    Palliative Performance Scale: 30%    Advance Directive: Advance Directive-    DPOA: Yes- Madhavi (?); then Hollie and Griselda  POL: No- one on file but invalid as not signed by patient    Code Status: DNR- DNI    Outcome:  Consult received and EMR reviewed; case discussed with MD and BS RN. After speaking with dtr Hollie, she and her sister requested to meet with PC Friday at 1600. Meeting time confirmed and MD updated.       Updated: MD/RN    Plan: follow up meeting with family tomorrow    Thank you for allowing Palliative Care to support this patient and family. Contact [phone] for additional assistance, change in patient status, or with any questions/concerns.

## 2020-01-30 NOTE — PROGRESS NOTES
Inpatient Anticoagulation Service Note    Date: 1/30/2020    Reason for Anticoagulation: Atrial Fibrillation   Target INR: 2.0 to 3.0  [id number] VASc Score: 5  HAS-BLED Score: 2   Hemoglobin Value: 14.3  Hematocrit Value: 43  Lab Platelet Value: (!) 163    INR from last 7 days     Date/Time INR Value    01/30/20 0252  (!) 6.44    01/29/20 0250  (!) 5.77    01/28/20 1238  (!) 4.65        Dose from last 7 days     Date/Time Dose (mg)    01/30/20 1439  0    01/29/20 1440  0    01/29/20 0020  0        Average Dose (mg): (Home dosing = 5 mg MWF, 2.5 all other days)  Significant Interactions: Antibiotics  Bridge Therapy: No     Comments: INR still increasing, now at 6.44. Last warfarin dose was PTA on 1/28 and was a 2.5 mg dose (home dose 5 mg M,W,F and 2.5 mg all other days). Pt had been on outpt abx and that may have interacted with warfarin and resulted the increased INR. Will continue to hold warfarin and trend the INR. No overt s/s of bleeding noted    Education Material Provided?: No(Chronic warfarin therapy)  Pharmacist suggested discharge dosing: GINNY Garcia, PharmD

## 2020-01-30 NOTE — PROGRESS NOTES
Patient resting in bed currently, daughter at bedside. Bed alarm on. Patient disoriented to time. Patient incontinent of urine this am. VSS. Patient being turned and repositioned for comfort and skin integrity. Call light within reach. Hourly rounds being completed.

## 2020-01-30 NOTE — PROGRESS NOTES
Hospital Medicine Daily Progress Note    Date of Service  1/30/2020    Chief Complaint  89 y.o. male admitted 1/28/2020 with LUE cellulitis and acute on chronic CHF exacerbation    Hospital Course    see below      Interval Problem Update  CHF-urinating ok. Hospice consulted placed. Patient became agitated last night and received 5 mg IV haldol.       Consultants/Specialty  Cards    Code Status  Dnar/dni    Disposition  TELE    Review of Systems  Review of Systems   Constitutional: Positive for malaise/fatigue. Negative for fever.        Bit worse today   Respiratory: Positive for shortness of breath.    Cardiovascular: Positive for leg swelling. Negative for chest pain and palpitations.        Unchanged   Gastrointestinal: Negative for diarrhea.   Musculoskeletal: Positive for joint pain. Negative for myalgias and neck pain.   Neurological: Negative for dizziness.   All other systems reviewed and are negative.       Physical Exam  Temp:  [36.4 °C (97.5 °F)-36.7 °C (98.1 °F)] 36.4 °C (97.5 °F)  Pulse:  [57-61] 57  Resp:  [16-18] 18  BP: (125-137)/(62-76) 136/71  SpO2:  [90 %-96 %] 95 %    Physical Exam  Vitals signs and nursing note reviewed.   Constitutional:       General: He is not in acute distress.     Appearance: He is well-developed.      Comments: Slightly fatigued this AM   HENT:      Head: Normocephalic and atraumatic.      Ears:      Comments: Hearing aids  Very hard of hearing     Nose:      Comments: NC in place     Mouth/Throat:      Pharynx: No oropharyngeal exudate.   Eyes:      Pupils: Pupils are equal, round, and reactive to light.   Neck:      Musculoskeletal: Normal range of motion and neck supple.      Thyroid: No thyromegaly.   Cardiovascular:      Rate and Rhythm: Normal rate and regular rhythm.      Heart sounds: Murmur present.   Pulmonary:      Effort: Pulmonary effort is normal.      Breath sounds: Rales present. No wheezing.      Comments: Somewhat poor aeration throughout-unchanged  today  Abdominal:      General: Bowel sounds are normal.      Palpations: Abdomen is soft.      Tenderness: There is no tenderness.   Musculoskeletal: Normal range of motion.         General: No tenderness.      Right lower leg: Edema present.      Left lower leg: Edema present.      Comments: Unchanged   Skin:     General: Skin is warm and dry.      Findings: Bruising and erythema (improving erythema) present. No rash.      Comments: Thin senile purpura   Neurological:      Mental Status: He is alert and oriented to person, place, and time.      Cranial Nerves: No cranial nerve deficit.         Fluids    Intake/Output Summary (Last 24 hours) at 1/30/2020 1235  Last data filed at 1/30/2020 0041  Gross per 24 hour   Intake 120 ml   Output 350 ml   Net -230 ml       Laboratory  Recent Labs     01/28/20  1238 01/29/20  0250   WBC 7.5 10.2   RBC 4.86 4.40*   HEMOGLOBIN 15.5 14.3   HEMATOCRIT 47.8 43.0   MCV 98.4* 97.7   MCH 31.9 32.5   MCHC 32.4* 33.3*   RDW 52.0* 51.6*   PLATELETCT 192 163*   MPV 10.2 10.4     Recent Labs     01/28/20  1238 01/29/20  0250 01/30/20  0252   SODIUM 137 140 142   POTASSIUM 4.2 4.2 4.2   CHLORIDE 101 104 102   CO2 26 24 27   GLUCOSE 87 95 131*   BUN 46* 44* 52*   CREATININE 2.00* 2.05* 2.29*   CALCIUM 9.9 8.9 9.8     Recent Labs     01/28/20  1238 01/29/20  0250 01/30/20  0252   INR 4.65* 5.77* 6.44*               Imaging  CT-HEAD W/O   Final Result      1.  Cerebral atrophy.      2.  White matter lucencies most consistent with small vessel ischemic change versus demyelination or gliosis. 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      3.  Otherwise, Head CT without contrast with no acute findings. No evidence of acute cerebral infarction, hemorrhage or mass lesion.      DX-CHEST-PORTABLE (1 VIEW)   Final Result         Small pleural effusions with bibasilar atelectasis, left more than right.      US-EXTREMITY VENOUS UPPER UNILAT LEFT   Final Result            Assessment/Plan  Chronic anticoagulation- (present on admission)  Assessment & Plan  The patient is maintained on Coumadin for his history of atrial fibrillation  Hold as mentioned above    Permanent atrial fibrillation- (present on admission)  Assessment & Plan  Continue diltiazem  mg daily  -hold coumadin given elevated INR (even higher today with levels >6), check daily INR, adjust PRN  -no clear e/o active bleeding    Essential hypertension- (present on admission)  Assessment & Plan  The patient is currently maintained on diltiazem  Monitor and follow    Severe aortic stenosis- (present on admission)  Assessment & Plan  Patient's aortic stenosis has progressed.  Followed by Dr. Bentley Marshall as an outpatient.  Attempt to medically manage this is not an interventional candidate.  -cardiology consulted, recommended hospice, consult placed, meeting with family tomorrow    Delirium- (present on admission)  Assessment & Plan  -severe sundowning at night  -start risperdal 0.5 mg QHS    Moderate mitral regurgitation- (present on admission)  Assessment & Plan  Patient is followed by Dr. Bentley Marshall.  He is felt not to be an interventional candidate    Cellulitis of left hand- (present on admission)  Assessment & Plan  Patient has a cat, the daughter states that she thinks he may have been scratched or bitten.  Check blood cultures-pending at this time  -discussed with DR zendejas, narrowed abx's to IV ancef, clinically appears a bit better    Acute congestive heart failure (HCC)- (present on admission)  Assessment & Plan  -remains volume overloaded, need to maintain patient's clinical status on a tight rope right now  -hospice consulted placed, no changes to medication regimen at this time, no other options  -continue gentle IV Lasix  -consulted cards  Continue diltiazem for rate control, EF is 75%  Follow daily BMP      Stage 4 chronic kidney disease (HCC)- (present on admission)  Assessment &  Plan  This will be a tricky situation in terms of trying to diurese this patient without worsening his renal function.  Cr continues to fluctuate, continue low dose IV lasix at this time  Follow daily BMP and urine output  Follow daily ins and outs and daily weights       VTE prophylaxis: coumadin, holding right now for supra-therapeutic INR

## 2020-01-30 NOTE — PROGRESS NOTES
Pt still refusing to get back in bed. Security put pt in bed and applied bilat soft wrist restraints. MD paged and updated to get order. MD also ordered 5mg haldol IV PRN. Family called to be notified but no answer.

## 2020-01-30 NOTE — TELEPHONE ENCOUNTER
"Spoke with patient's daughter, Hollie on the phone.   She wanted to discuss hospice for her father.    States she is not ready to \"give up.\"    Given his age and his now severe AS and his unlikely candidacy for intervention, medical therapy is our only option, however, his LE swelling and symptoms of volume overload seem to be improving.  I do not think we have exhausted our efforts and would recommend continued medical mgmt of his CV issues.      Actually IV lasix in the hosp setting should be quite helpful.    I did encourage palliative care meeting, but I do not think his CV status alone warrants hospice at this time without exhausting diuretic management.      Zeina Orourke PA-C  1/30/2020    "

## 2020-01-30 NOTE — ASSESSMENT & PLAN NOTE
-severe sundowning at night  -start risperdal 0.5 mg QHS  -IV Haldol as needed and try to reorient frequently with normal sleep-wake cycle and family at bedside

## 2020-01-31 NOTE — THERAPY
"  Speech Language Therapy Clinical Swallow Evaluation completed.  Functional Status: Pt with significant choking episode earlier when taking potassium pill per nurs. Pt sitting up in chair. Oral suction set up. Pt with low to fair intensity voice with mild hoarse quality. Upper airway, to auscultation, mildly congested sounding before and during swallow assessment. Per pt's dgtr, pt with coughing at home when at rest and during meals. Previously, pt's dgtr has discussed pt's swallowing/coughing during meals with his primary MD, per pt's dgtr report. Pt on regular texture at home. Pt taking sips of thickened water from the spoon and cup. Pt taking 1 tsp amounts of applesauce. No immediate coughing post swallow but with intermittent coughing before, during, and following swallow assessment. Pt complains of mild throat pain and pointing to left side of his throat. Pt and his dgtr in agreement for diet modification to NTL full as tolerated. They may request change in texture per their preference and despite risks if pt dislikes the texture and will discuss with nurs. Per nurs, Hospice eval pending.   Recommendations - Diet: Diet / Liquid Recommendation: Nectar Thick Full Liquid                          Strategies: Strict 1:1 feeding, HOB 90 degrees, slow rate, hold intake with difficulty.                            Medication Administration: Medication Administration : Crush all Medications in Puree  Plan of Care: Will benefit from Speech Therapy 3 times per week  Post-Acute Therapy: Discharge to home with outpatient or home health for additional skilled therapy services-dependent on POC and pt status. Ortiz    See \"Rehab Therapy-Acute\" Patient Summary Report for complete documentation.   "

## 2020-01-31 NOTE — DOCUMENTATION QUERY
[facility]                                                                       Query Response Note      PATIENT:               SIXTO LUDWIG  ACCT #:                  [id number]  MRN:                     [id number]  :                      [id number]  ADMIT DATE:       2020 12:11 PM  DISCH DATE:          RESPONDING  PROVIDER #:        [id number]           QUERY TEXT:    Acute on chronic CHF exacerbation (type unspecified) is documented in the Medical Record. Please specify the type of heart failure.      NOTE:  If an appropriate response is not listed below, please respond with a new note.      The patient's Clinical Indicators include:  Last echo 20: EF 75% - Grade III diastolic dysfunction  +2 BLE edema  Lasix 40 IV   Atrial fibrillation  Hypertension  Severe aortic stenosis  Stage 4 CKD  Options provided:   -- Acute on Chronic Systolic heart failure   -- Acute on Chronic Diastolic heart failure   -- Acute on Chronic Systolic and diastolic heart failure   -- Unable to determine      Query created by: Neha Cooney on 2020 4:08 PM    RESPONSE TEXT:    Acute on Chronic Systolic heart failure          Electronically signed by:  JENNIFER PAZ MD 2020 1:58 PM

## 2020-01-31 NOTE — WOUND TEAM
[facility]  Inpatient Services  Initial Wound and Skin Care Evaluation    Admission Date: 1/28/2020     Consult Date: 1/30   HPI, PMH, SH: Reviewed    Unit where seen by Wound Team: [id number]/02     WOUND CONSULT RELATED TO:  L hand, BLE     Self Report / Pain Level:  denies       OBJECTIVE:  Pt on pressure redistribution mattress with waffle overlay. Bilateral heel mepilex in place.    WOUND TYPE, LOCATION, CHARACTERISTICS (Pressure Injuries: location, stage, POA or date identified)    Wound 01/28/20 Leg RLE, LLE (Active)   Wound Image    1/30/2020  5:00 PM   Site Assessment Dry;Brown    Raya-wound Assessment Red;Edema    Margins Attached edges    Tunneling 0 cm    Undermining 0 cm    Closure None    Drainage Amount None    Cleansing Approved Wound Cleanser    Periwound Protectant Skin Moisturizer    Dressing Options Open to Air    Dressing Cleansing/Solutions Not Applicable    Dressing Change Frequency As Needed    NEXT Weekly Photo (Inpatient Only) 02/06/20    WOUND NURSE ONLY - Odor None    WOUND NURSE ONLY - Pulses Left;Right;2+    WOUND NURSE ONLY - Exposed Structures None    WOUND NURSE ONLY - Tissue Type and Percentage dry brown scabs           Vascular:    Dorsal Pedal pulses:  +2 BLE   +1 radial pulse- LUE  Posterior tib pulses:   +2 BLE    KEVIN:      Not applicable at this time-pt transferring to hospice.    Lab Values:    Lab Results   Component Value Date/Time    WBC 10.2 01/29/2020 02:50 AM    RBC 4.40 (L) 01/29/2020 02:50 AM    HEMOGLOBIN 14.3 01/29/2020 02:50 AM    HEMATOCRIT 43.0 01/29/2020 02:50 AM                    Lab Results   Component Value Date/Time    HBA1C 5.6 07/20/2017 03:21 PM           Culture:   NA    INTERVENTIONS BY WOUND TEAM:  BLE cleansed with wound cleanser and gauze. Sween 24 ( pink top) moisturizer applied to raya wound.   Tubi  F applied to LUE for comfort. RN notified.    Interdisciplinary consultation: PatientStefanie RN    EVALUATION: Pt presenting with  resolving edema, erythema to BLE. Previously open ulcers are now scabbed and dried. Skin moisturizer applied for comfort and left ELISA. Order placed for Aquaphor per family request, as pt has not yet transitioned to hospice. Tubi  applied to LUE for comfort. LUE presenting with +3 pitting edema, also resolving per family member. Wound team to sign off once pt transitions. Nursing may re consult as needed.    Goals: Steady decrease in wound area and depth weekly.    NURSING PLAN OF CARE ORDERS (X):  Dressing changes: See Dressing Care orders:   Skin care: See Skin Care orders: x  Rectal tube care: See Rectal Tube Care orders:        Other orders:                           RSKIN:   CURRENTLY IN PLACE (X), APPLIED THIS VISIT (A), ORDERED (O):   Q shift Deep:  X  Q shift pressure point assessments:  X  Pressure redistribution mattress x                            Low Airloss                        Bariatric BERTHA                     Bariatric foam                        Heel float boots                     Heel Silicone dressing   x                      Float Heels off Bed with Pillows               Barrier wipes o        Barrier Cream         Barrier paste x         Sacral silicone dressing         Silicone O2 tubing         Anchorfast         Cannula fixation Device (Tender )          Gray Foam Ear protectors           Trach with Optifoam split foam                 Waffle cushion        Waffle Overlay  x       Rectal tube or BMS    Purwick/Condom Cath          Antifungal tx      Interdry          Reposition q 2 hours      x  Up to chair  x      Ambulate      PT/OT        Dietician x       Diabetes Education      PO x    TF     TPN     NPO   # days   Other        WOUND TEAM PLAN OF CARE (X):   Dressing changes by wound team:          Follow up 1-2 times weekly: x              Follow up 3 times weekly:                NPWT change 3 times weekly:     Follow up as needed:       Other (explain):     Anticipated  discharge plans (X):   LTACH:        SNF/Rehab:                  Home Care:           Outpatient Wound Center:            Self Care:            Other:     Hospice

## 2020-01-31 NOTE — PROGRESS NOTES
Inpatient Anticoagulation Service Note    Date: 1/31/2020    Reason for Anticoagulation: Atrial Fibrillation   Target INR: 2.0 to 3.0  [id number] VASc Score: 5  HAS-BLED Score: 2   Hemoglobin Value: 14.3  Hematocrit Value: 43  Lab Platelet Value: (!) 163    INR from last 7 days     Date/Time INR Value    01/31/20 0853  (!) 4.47    01/30/20 0252  (!) 6.44    01/29/20 0250  (!) 5.77    01/28/20 1238  (!) 4.65        Dose from last 7 days     Date/Time Dose (mg)    01/31/20 1418  0    01/30/20 1439  0    01/29/20 1440  0    01/29/20 0020  0        Average Dose (mg): (Home dosing = 5 mg MWF, 2.5 all other days)  Significant Interactions: Antibiotics  Bridge Therapy: No     Reversal Agent Administered: Not Applicable    Comments: INR has peaked and is now down to 4.47. Will continue to hold warfarin and trend the INR.     Education Material Provided?: No(Chronic warfarin therapy)  Pharmacist suggested discharge dosing: GINNY Garcia, PharmD

## 2020-01-31 NOTE — PROGRESS NOTES
Assumed care at 0700, bedside report received from night shift RN. Pt is afib/partially paced on the telemetry monitor. Patient is AO x 2 and is resting in bed with even respirations. Initial assessment completed and orders reviewed. POC addressed with patient. Call light within reach and hourly rounding in place. Fall precautions in place.  Per night shift RN, patient confused overnight and this AM. Patient became more oriented after breakfast, however still remains forgetful.

## 2020-01-31 NOTE — PROGRESS NOTES
Hospital Medicine Daily Progress Note    Date of Service  1/31/2020    Chief Complaint  89 y.o. male admitted 1/28/2020 with LUE cellulitis and acute on chronic CHF exacerbation    Hospital Course    see below      Interval Problem Update  CHF-patient sundowning again last night.  He is a bit confused this morning but is able to converse.  He wants to go home.  Worsening wheezing and coughing in front of me.  No other new issues other than slow atrial flutter.  Creatinine is improved slightly and cannot tolerate oral potassium pills.  Awaiting hospice evaluation this afternoon.      Consultants/Specialty  Cards  Hospice    Code Status  Dnar/dni    Disposition  TELE, goal is to do home hospice    Review of Systems  Review of Systems   Constitutional: Positive for malaise/fatigue. Negative for chills and fever.        No appreciable improvement today   Respiratory: Positive for cough, shortness of breath and wheezing.    Cardiovascular: Positive for leg swelling. Negative for chest pain and palpitations.        No interval clinical change   Gastrointestinal: Negative for abdominal pain, nausea and vomiting.   Genitourinary: Negative for dysuria and urgency.   Musculoskeletal: Positive for joint pain. Negative for myalgias and neck pain.   Neurological: Negative for dizziness.   All other systems reviewed and are negative.       Physical Exam  Temp:  [36.1 °C (97 °F)-36.7 °C (98 °F)] 36.6 °C (97.8 °F)  Pulse:  [57-71] 58  Resp:  [16-20] 20  BP: (119-179)/(63-87) 179/87  SpO2:  [92 %-97 %] 93 %    Physical Exam  Vitals signs and nursing note reviewed.   Constitutional:       General: He is not in acute distress.     Appearance: He is well-developed.      Comments: Slightly fatigued this AM   HENT:      Head: Normocephalic and atraumatic.      Ears:      Comments: Hearing aids  Very hard of hearing     Nose:      Comments: NC in place     Mouth/Throat:      Pharynx: No oropharyngeal exudate.   Eyes:      General:          Right eye: No discharge.         Left eye: No discharge.      Pupils: Pupils are equal, round, and reactive to light.   Neck:      Musculoskeletal: Normal range of motion and neck supple.      Thyroid: No thyromegaly.   Cardiovascular:      Rate and Rhythm: Normal rate and regular rhythm.      Heart sounds: Murmur present.   Pulmonary:      Effort: Pulmonary effort is normal. No respiratory distress.      Breath sounds: Wheezing and rales present.      Comments: Much more coarse breath sounds today  Abdominal:      General: Bowel sounds are normal. There is no distension.      Palpations: Abdomen is soft.   Musculoskeletal: Normal range of motion.         General: No tenderness.      Right lower leg: Edema present.      Left lower leg: Edema present.      Comments: 2+ bilateral lower extremities   Skin:     General: Skin is warm and dry.      Findings: Bruising and erythema present. No rash.      Comments: Thin senile purpura  Unchanged erythema   Neurological:      Mental Status: He is alert and oriented to person, place, and time.      Cranial Nerves: No cranial nerve deficit.         Fluids    Intake/Output Summary (Last 24 hours) at 1/31/2020 1051  Last data filed at 1/30/2020 1800  Gross per 24 hour   Intake 240 ml   Output 250 ml   Net -10 ml       Laboratory  Recent Labs     01/28/20  1238 01/29/20  0250   WBC 7.5 10.2   RBC 4.86 4.40*   HEMOGLOBIN 15.5 14.3   HEMATOCRIT 47.8 43.0   MCV 98.4* 97.7   MCH 31.9 32.5   MCHC 32.4* 33.3*   RDW 52.0* 51.6*   PLATELETCT 192 163*   MPV 10.2 10.4     Recent Labs     01/29/20  0250 01/30/20  0252 01/31/20  0853   SODIUM 140 142 142   POTASSIUM 4.2 4.2 3.9   CHLORIDE 104 102 103   CO2 24 27 30   GLUCOSE 95 131* 154*   BUN 44* 52* 52*   CREATININE 2.05* 2.29* 2.06*   CALCIUM 8.9 9.8 9.8     Recent Labs     01/29/20  0250 01/30/20  0252 01/31/20  0853   INR 5.77* 6.44* 4.47*               Imaging  CT-HEAD W/O   Final Result      1.  Cerebral atrophy.      2.  White matter  lucencies most consistent with small vessel ischemic change versus demyelination or gliosis. 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      3.  Otherwise, Head CT without contrast with no acute findings. No evidence of acute cerebral infarction, hemorrhage or mass lesion.      DX-CHEST-PORTABLE (1 VIEW)   Final Result         Small pleural effusions with bibasilar atelectasis, left more than right.      US-EXTREMITY VENOUS UPPER UNILAT LEFT   Final Result           Assessment/Plan  Chronic anticoagulation- (present on admission)  Assessment & Plan  The patient is maintained on Coumadin for his history of atrial fibrillation  Hold as mentioned above    Permanent atrial fibrillation- (present on admission)  Assessment & Plan  Continue diltiazem  mg daily  -hold coumadin given elevated INR (even higher today with levels >4), check daily INR, adjust PRN  -no clear e/o active bleeding    Essential hypertension- (present on admission)  Assessment & Plan  The patient is currently maintained on diltiazem  Monitor and follow    Severe aortic stenosis- (present on admission)  Assessment & Plan  Patient's aortic stenosis has progressed.  Followed by Dr. Bentley Marshall as an outpatient.  Attempt to medically manage this is not an interventional candidate.  -cardiology consulted, recommended hospice, consult placed, hospice is planning to come today  -Patient has much worsening wheezing today, family did not want him to have breathing treatments as it makes his mental status worse    Delirium- (present on admission)  Assessment & Plan  -severe sundowning at night  -start risperdal 0.5 mg QHS  -IV Haldol as needed and try to reorient frequently with normal sleep-wake cycle and family at bedside    Moderate mitral regurgitation- (present on admission)  Assessment & Plan  Patient is followed by Dr. Bentley Marshall.  He is felt not to be an interventional candidate    Cellulitis of left hand- (present on  admission)  Assessment & Plan  Patient has a cat, the daughter states that she thinks he may have been scratched or bitten.  Check blood cultures-pending at this time  -discussed with DR zendejas, narrowed abx's to IV ancef, clinically appears a bit better    Acute congestive heart failure (HCC)- (present on admission)  Assessment & Plan  -remains volume overloaded, need to maintain patient's clinical status on a tight rope right now  -hospice consulted placed, no changes to medication regimen at this time, no other options  -continue gentle IV Lasix  -consulted cards  Continue diltiazem for rate control, EF is 75%  Follow daily BMP  -No appreciable medical improvement and doubt we will get any    Stage 4 chronic kidney disease (HCC)- (present on admission)  Assessment & Plan  This will be a tricky situation in terms of trying to diurese this patient without worsening his renal function.  Cr continues to fluctuate, continue low dose IV lasix at this time, creatinine is slightly improved today but near his baseline  Follow daily BMP and urine output  Follow daily ins and outs and daily weights       VTE prophylaxis: coumadin, holding right now for supra-therapeutic INR

## 2020-01-31 NOTE — PROGRESS NOTES
Patient care assumed, bedside report received, POC discussed, verbalizes understanding. Safety measures in place, call light in place. Pt A7Ox3 and resting comfortably in bed with no complaints at this time

## 2020-01-31 NOTE — PROGRESS NOTES
Per family request, they do not want patient to receive Duoneb Nebulizer breathing treatments.  They are stating that they feel the steroids cause patient confusion.  [facility] Respiratory therapist was notified.

## 2020-01-31 NOTE — PROGRESS NOTES
Patient had 9 beats of V tach, 1 regular beat then 9 more beats of V tach. Patient asymptomatic, sitting in chair with family at bedside currently. No c/o pain or nausea. VSS. Dr. Engle notified via telephone call. Hourly rounds being completed.

## 2020-01-31 NOTE — PROGRESS NOTES
Monitor Summary: HR= 55 - 62, QRS .12, Aflutter with rare couplet PVC, trigeminy PVC and rare PVC per strip from monitor room.

## 2020-01-31 NOTE — CARE PLAN
Problem: Safety  Goal: Will remain free from injury  Outcome: PROGRESSING AS EXPECTED  Note:   Bed locked and in lowest position, Bed alarm on. Treaded socks on. Call light and belongings within reach. Patient educated to call for assistance. Patient verbalized understanding. Hourly rounding in place.       Problem: Bowel/Gastric:  Goal: Normal bowel function is maintained or improved  Outcome: PROGRESSING AS EXPECTED  Intervention: Educate patient and significant other/support system about diet, fluid intake, medications and activity to promote bowel function  Note:   Patient having bowel movements, frequent and incontinent. Will hold stool softeners at this time.

## 2020-02-01 PROBLEM — R41.0 DELIRIUM: Status: RESOLVED | Noted: 2020-01-01 | Resolved: 2020-01-01

## 2020-02-01 NOTE — PALLIATIVE CARE
Palliative Care follow-up  PC RN met with pt, Luiza at bedside. Introduced self and role of PC. Discussed their understanding of pt's clinical picture, pt and family verbalized understanding. Discussed hospice referral, pt's wife was on hospice last year,  in October. Family verbalized understanding of hospice, philosophy, goals and support. They had questions regarding pt's planned pacemaker battery change in . PC RN encouraged them to discuss this with the hospice Liaison.     Family had concerns regarding pt's swollen arm and shortness of breath. Relayed concerns to Dr. Enlge.     Completed new POLST to reflect DNR, Comfort focused treatment only and no feeding tubes. Pt signed POLST, placed on hard chart for MD signature.     Provided contact cards to daughters, encouraged them to call with any questions or concerns.     Active listening, education, validation, normalization, therapeutic touch, and emotional support provided.     Updated:   Dr. Sadler, PC Team    Plan:   Family to meet with [facility] to discuss services, POLST awaiting MD signature.       Thank you for allowing Palliative Care to participate in this patient's care. Please feel free to call [phone] with any questions or concerns.

## 2020-02-01 NOTE — DISCHARGE PLANNING
Agency/Facility Name: [facility]  Outcome: Left voice message for Juliette, clinical liaison in regards to patient referral. Requested a call back.

## 2020-02-01 NOTE — DISCHARGE PLANNING
Hospital Care Management Discharge Planning       Anticipated Discharge Disposition:   · Home w/ [facility]     Action:   · This RN CM LVM for Juliette with [facility] ([phone]) requesting update on family consent progress.      Barriers to Discharge:   · Hospice Admission.   · DME delivery.  · [facility] transportation arrangements.      Plan:   · Await call back from Juliette with [facility].   · F/U with [facility] and treatment team.   · Continue to provide support services and assistance with discharge planning as needed.       For further assistance please contact the weekend RN Case Manager at [phone].

## 2020-02-01 NOTE — PALLIATIVE CARE
"Palliative Care follow-up  PC RN visited with Primitivo and his daughters at . They expressed the plan as home with [facility] supports and that \"the doctor says he's releasing him today.\" PC offered to speak with CM and/or hospice about DC plan. They are also asking that his \"ambulance coverage\" be used. Discussed with CM Minda who has reached out to [facility]; CM also aware family requesting ambulance for ride home given his SOB and inability to ambulate much. POLST signed by MD and placed on the chart for time of DC. Family aware of plan at this point and CM team awaiting call back from [facility].      Plan: home with hospice when arranged    Thank you for allowing Palliative Care to support this patient and family. Contact [phone] for additional assistance, change in patient status, or with any questions/concerns.   "

## 2020-02-01 NOTE — CARE PLAN
Problem: Communication  Goal: The ability to communicate needs accurately and effectively will improve  Outcome: PROGRESSING AS EXPECTED  Intervention: Educate patient and significant other/support system about the plan of care, procedures, treatments, medications and allow for questions  Flowsheets (Taken 2/1/2020 1249)  Pt & Family Have Been Educated on Methods Available to Report Concerns Related to Care, Treatment, Services, and Patient Safety Issues: Yes  Note:   Pt educated regarding plan of care and medications. All questions answered.        Problem: Safety  Goal: Will remain free from injury  Outcome: PROGRESSING AS EXPECTED  Intervention: Educate patient and significant other/support system about adaptive mobility strategies and safe transfers  Note:   Fall precautions in place. Bed in lowest position. Non-skid socks in place. Personal possessions within reach. Mobility sign on door. Bed-alarm on. Call light within reach. Pt educated regarding fall prevention and states understanding.     Goal: Will remain free from falls  Outcome: PROGRESSING AS EXPECTED  Intervention: Implement fall precautions  Flowsheets  Taken 2/1/2020 1249  Bed Alarm: Yes - Alarm On  Chair/Bed Strip Alarm: Yes - Alarm On  Taken 2/1/2020 1200  Environmental Precautions: Treaded Slipper Socks on Patient;Personal Belongings, Wastebasket, Call Bell etc. in Easy Reach;Transferred to Stronger Side;Report Given to Other Health Care Providers Regarding Fall Risk;Bed in Low Position;Communication Sign for Patients & Families;Mobility Assessed & Appropriate Sign Placed  Note:   Fall precautions in place. Bed in lowest position. Non-skid socks in place. Personal possessions within reach. Mobility sign on door. Bed-alarm on. Call light within reach. Pt educated regarding fall prevention and states understanding.        Problem: Knowledge Deficit  Goal: Knowledge of disease process/condition, treatment plan, diagnostic tests, and medications  will improve  Outcome: PROGRESSING AS EXPECTED  Intervention: Explain information regarding disease process/condition, treatment plan, diagnostic tests, and medications and document in education  Note:   Pt educated regarding plan of care and medications. All questions answered.     Goal: Knowledge of the prescribed therapeutic regimen will improve  Outcome: PROGRESSING AS EXPECTED  Intervention: Discuss information regarding therpeutic regimen and document in education  Note:   Pt educated regarding plan of care and medications. All questions answered.

## 2020-02-01 NOTE — PROGRESS NOTES
Bedside report received from [facility] RN Ruby. POC discussed with pt; all questions answered at this time. Patient needs addressed.  Fall and Aspiration Precautions in place

## 2020-02-01 NOTE — CARE PLAN
Problem: Communication  Goal: The ability to communicate needs accurately and effectively will improve  Outcome: PROGRESSING AS EXPECTED     Problem: Bowel/Gastric:  Goal: Normal bowel function is maintained or improved  Outcome: PROGRESSING AS EXPECTED  Goal: Will not experience complications related to bowel motility  Outcome: PROGRESSING AS EXPECTED     Problem: Discharge Barriers/Planning  Goal: Patient's continuum of care needs will be met  Outcome: PROGRESSING AS EXPECTED     Problem: Pain Management  Goal: Pain level will decrease to patient's comfort goal  Outcome: PROGRESSING AS EXPECTED     Problem: Fluid Volume:  Goal: Will maintain balanced intake and output  Outcome: PROGRESSING AS EXPECTED

## 2020-02-01 NOTE — DISCHARGE PLANNING
Dr Gunn??   Hospital Care Management Discharge Planning       Anticipated Discharge Disposition:   · Home w/ [facility]     Action:   · This RN CM spoke to Escobar RN with [facility].   · She has 4 patients to see ahead of Mr. Langford but is going to call her supervisor to explore the possibility of expediting his admission for today.      Barriers to Discharge:   · Hospice Consents.  · Transportation Scheduling.      Plan:   · Await return call from [facility] RN.   · F/U with pt and medical team.    · Continue to provide support services and assistance with discharge planning as needed.        For further assistance please contact the weekend RN Case Manager at [phone].

## 2020-02-01 NOTE — WOUND TEAM
F/u on BLE wounds. 3 scabs on medial BLE now open with with red, yellow wound bed. Discussed POC with Lilliana RN and daughters at bedside. Both parties agrees with comfort oriented wound care at this time as pt will likely DC home with [facility] today. 3 small adhesive foams applied to wound bed. Extra adhesive foam provided to family to use as needed. No advanced wound care needs at this time. Wound team to sign off. Re-consult as needed.

## 2020-02-01 NOTE — DISCHARGE PLANNING
Hospital Care Management Discharge Planning       Anticipated Discharge Disposition:   · Home w/ [facility].     Action:   · This RN CM spoke with Julianna at [facility].   · Patient has been accepted to [facility] and RADHA Levine will arrange oxygen delivery wit [employer].   · 1454: Per Julianna [employer] may delivery patient oxygen to daughter tonight, if oxygen is delivered, the he can discharge and Hospice will admit him tomorrow between [id number].  · 1545: Per Julianna [employer] will deliver patient oxygen tanks and concentrator tonight at approximately 1800. Daughter will bring discharge tank to bedside and MD will place orders for patient to discharge home this evening.  · Family and BS RN updated.       Barriers to Discharge:   · Oxygen Delivery.     Plan:    · Continue to provide support services and assistance with discharge planning as needed.       Thank you for allowing me the pleasure of participating in this patient's care coordination and discharge planning.       For further assistance this evening, please contact the [facility] at [phone].

## 2020-02-01 NOTE — DISCHARGE PLANNING
Hospital Care Management Discharge Planning       Anticipated Discharge Disposition:   · Home w/ [facility].     Action:   · This RN CM placed page through the call system for Juliette, with [facility] as no return call has been received at this time.      Barriers to Discharge:   · Hospice acceptance and consents.  · DME delivery.  · Transportation arrangement.      Plan:   · Await return call form [facility].    · Continue to provide support services and assistance with discharge planning as needed.       For further assistance please contact the weekend RN Case Manager at [phone].

## 2020-02-02 NOTE — PROGRESS NOTES
Monitor Summary:  Hr: 960-64  Rhythm: A Fib  Ectopy: F PVC, O trig, PVC, BBB  Measurements: -/0.14/-

## 2020-02-02 NOTE — PROGRESS NOTES
Patient discharged at 1935 via wheelchair with family members and RADHA Corral.  No IV in place, removed 1/31/20.  Patient on 1L NC with home oxygen tank.  Patient discharge instructions printed and discussed with family and patient.  Family and patient verbalize understanding of follow-up appointments, medications, labs, and activity/diet instructions.  Patient belongings sent home with daughter Hollie.

## 2020-02-02 NOTE — DISCHARGE INSTRUCTIONS
Discharge Instructions    Discharged to home by car with relative. Discharged via wheelchair, hospital escort: Yes.  Special equipment needed: Oxygen    Be sure to schedule a follow-up appointment with your primary care doctor or any specialists as instructed.     Discharge Plan:   Diet Plan: Discussed  Activity Level: Discussed  Confirmed Follow up Appointment: Appointment Scheduled  Confirmed Symptoms Management: Discussed  Medication Reconciliation Updated: Yes  Influenza Vaccine Indication: Patient Refuses    I understand that a diet low in cholesterol, fat, and sodium is recommended for good health. Unless I have been given specific instructions below for another diet, I accept this instruction as my diet prescription.   Other diet: Whatever you would like    Special Instructions:     · Is patient discharged on Warfarin / Coumadin?   No     Depression / Suicide Risk    As you are discharged from this [facility] facility, it is important to learn how to keep safe from harming yourself.    Recognize the warning signs:  · Abrupt changes in personality, positive or negative- including increase in energy   · Giving away possessions  · Change in eating patterns- significant weight changes-  positive or negative  · Change in sleeping patterns- unable to sleep or sleeping all the time   · Unwillingness or inability to communicate  · Depression  · Unusual sadness, discouragement and loneliness  · Talk of wanting to die  · Neglect of personal appearance   · Rebelliousness- reckless behavior  · Withdrawal from people/activities they love  · Confusion- inability to concentrate     If you or a loved one observes any of these behaviors or has concerns about self-harm, here's what you can do:  · Talk about it- your feelings and reasons for harming yourself  · Remove any means that you might use to hurt yourself (examples: pills, rope, extension cords, firearm)  · Get professional help from the community (Mental Health,  Substance Abuse, psychological counseling)  · Do not be alone:Call your Safe Contact- someone whom you trust who will be there for you.  · Call your local CRISIS HOTLINE 077-7878 or 207-302-3955  · Call your local Children's Mobile Crisis Response Team [place] (812) 421-6149 or www.SafeBoot  · Call the toll free National Suicide Prevention Hotlines   · National Suicide Prevention Lifeline 265-953-SKWT (1296)  · National Hope Line Network 800-SUICIDE (242-4826)    Hospice  Hospice is a service that is designed to provide people who are terminally ill and their families with medical, spiritual, and psychological support. Its aim is to improve your quality of life by keeping you as alert and comfortable as possible. Hospice is performed by a team of health care professionals and volunteers who:  · Help keep you comfortable. Hospice can be provided in your home or in a homelike setting. The hospice staff works with your family and friends to help meet your needs. You will enjoy the support of loved ones by receiving much of your basic care from family and friends.  · Provide pain relief and manage your symptoms. The staff supply all necessary medicines and equipment.  · Provide companionship when you are alone.  · Allow you and your family to rest. They may do light housekeeping, prepare meals, and run errands.  · Provide counseling. They will make sure your emotional, spiritual, and social needs and those of your family are being met.  · Provide spiritual care. Spiritual care is individualized to meet your needs and your family's needs. It may involve helping you look at what death means to you, say goodbye, or perform a specific Anabaptist ceremony or ritual.  Hospice teams often include:  · A nurse.  · A doctor.  · Social workers.  · Jewish leaders (such as a ).  · Trained volunteers.  WHEN SHOULD HOSPICE CARE BEGIN?  Most people who use hospice are believed to have fewer than 6 months to  live. Your family and health care providers can help you decide when hospice services should begin. If your condition improves, you may discontinue the program.  WHAT SHOULD I CONSIDER BEFORE SELECTING A PROGRAM?  Most hospice programs are run by nonprofit, independent organizations. Some are affiliated with hospitals, nursing homes, or home health care agencies. Hospice programs can take place in the home or at a hospice center, hospital, or skilled nursing facility. When choosing a hospice program, ask the following questions:  · What services are available to me?  · What services are offered to my loved ones?  · How involved are my loved ones?  · How involved is my health care provider?  · Who makes up the hospice care team? How are they trained or screened?  · How will my pain and symptoms be managed?  · If my circumstances change, can the services be provided in a different setting, such as my home or in the hospital?  · Is the program reviewed and licensed by the state or certified in some other way?  WHERE CAN I LEARN MORE ABOUT HOSPICE?  You can learn about existing hospice programs in your area from your health care providers. You can also read more about hospice online. The websites of the following organizations contain helpful information:  · The National Hospice and Palliative Care Organization (NHPCO).  · The Hospice Association of [place] (HAA).  · The Hospice Education [place].  · The American Cancer Society (ACS).  · Hospice Net.  This information is not intended to replace advice given to you by your health care provider. Make sure you discuss any questions you have with your health care provider.  Document Released: 04/05/2005 Document Revised: 12/23/2014 Document Reviewed: 10/28/2014  Elsevier Interactive Patient Education © 2017 Elsevier Inc.      Cellulitis, Adult  Introduction  Cellulitis is a skin infection. The infected area is usually red and sore. This condition occurs most often in the  arms and lower legs. It is very important to get treated for this condition.  Follow these instructions at home:  · Take over-the-counter and prescription medicines only as told by your doctor.  · If you were prescribed an antibiotic medicine, take it as told by your doctor. Do not stop taking the antibiotic even if you start to feel better.  · Drink enough fluid to keep your pee (urine) clear or pale yellow.  · Do not touch or rub the infected area.  · Raise (elevate) the infected area above the level of your heart while you are sitting or lying down.  · Place warm or cold wet cloths (warm or cold compresses) on the infected area. Do this as told by your doctor.  · Keep all follow-up visits as told by your doctor. This is important. These visits let your doctor make sure your infection is not getting worse.  Contact a doctor if:  · You have a fever.  · Your symptoms do not get better after 1-2 days of treatment.  · Your bone or joint under the infected area starts to hurt after the skin has healed.  · Your infection comes back. This can happen in the same area or another area.  · You have a swollen bump in the infected area.  · You have new symptoms.  · You feel ill and also have muscle aches and pains.  Get help right away if:  · Your symptoms get worse.  · You feel very sleepy.  · You throw up (vomit) or have watery poop (diarrhea) for a long time.  · There are red streaks coming from the infected area.  · Your red area gets larger.  · Your red area turns darker.  This information is not intended to replace advice given to you by your health care provider. Make sure you discuss any questions you have with your health care provider.  Document Released: 06/05/2009 Document Revised: 05/25/2017 Document Reviewed: 10/26/2016  © 2017 Elsevier    Amoxicillin; Clavulanic Acid tablets  What is this medicine?  AMOXICILLIN; CLAVULANIC ACID (a mox i KJ in; DESIREE morgan AS id) is a penicillin antibiotic. It is used to  treat certain kinds of bacterial infections. It will not work for colds, flu, or other viral infections.  This medicine may be used for other purposes; ask your health care provider or pharmacist if you have questions.  COMMON BRAND NAME(S): Augmentin  What should I tell my health care provider before I take this medicine?  They need to know if you have any of these conditions:  -bowel disease, like colitis  -kidney disease  -liver disease  -mononucleosis  -an unusual or allergic reaction to amoxicillin, penicillin, cephalosporin, other antibiotics, clavulanic acid, other medicines, foods, dyes, or preservatives  -pregnant or trying to get pregnant  -breast-feeding  How should I use this medicine?  Take this medicine by mouth with a full glass of water. Follow the directions on the prescription label. Take at the start of a meal. Do not crush or chew. If the tablet has a score line, you may cut it in half at the score line for easier swallowing. Take your medicine at regular intervals. Do not take your medicine more often than directed. Take all of your medicine as directed even if you think you are better. Do not skip doses or stop your medicine early.  Talk to your pediatrician regarding the use of this medicine in children. Special care may be needed.  Overdosage: If you think you have taken too much of this medicine contact a poison control center or emergency room at once.  NOTE: This medicine is only for you. Do not share this medicine with others.  What if I miss a dose?  If you miss a dose, take it as soon as you can. If it is almost time for your next dose, take only that dose. Do not take double or extra doses.  What may interact with this medicine?  -allopurinol  -anticoagulants  -birth control pills  -methotrexate  -probenecid  This list may not describe all possible interactions. Give your health care provider a list of all the medicines, herbs, non-prescription drugs, or dietary supplements you use.  Also tell them if you smoke, drink alcohol, or use illegal drugs. Some items may interact with your medicine.  What should I watch for while using this medicine?  Tell your doctor or health care professional if your symptoms do not improve.  Do not treat diarrhea with over the counter products. Contact your doctor if you have diarrhea that lasts more than 2 days or if it is severe and watery.  If you have diabetes, you may get a false-positive result for sugar in your urine. Check with your doctor or health care professional.  Birth control pills may not work properly while you are taking this medicine. Talk to your doctor about using an extra method of birth control.  What side effects may I notice from receiving this medicine?  Side effects that you should report to your doctor or health care professional as soon as possible:  -allergic reactions like skin rash, itching or hives, swelling of the face, lips, or tongue  -breathing problems  -dark urine  -fever or chills, sore throat  -redness, blistering, peeling or loosening of the skin, including inside the mouth  -seizures  -trouble passing urine or change in the amount of urine  -unusual bleeding, bruising  -unusually weak or tired  -white patches or sores in the mouth or throat  Side effects that usually do not require medical attention (report to your doctor or health care professional if they continue or are bothersome):  -diarrhea  -dizziness  -headache  -nausea, vomiting  -stomach upset  -vaginal or anal irritation  This list may not describe all possible side effects. Call your doctor for medical advice about side effects. You may report side effects to FDA at 4-680-FDA-9527.  Where should I keep my medicine?  Keep out of the reach of children.  Store at room temperature below 25 degrees C (77 degrees F). Keep container tightly closed. Throw away any unused medicine after the expiration date.  NOTE: This sheet is a summary. It may not cover all possible  information. If you have questions about this medicine, talk to your doctor, pharmacist, or health care provider.  © 2018 Elsevier/Gold Standard (2009-03-12 12:04:30)

## 2020-02-02 NOTE — CARE PLAN
Problem: Communication  Goal: The ability to communicate needs accurately and effectively will improve  Outcome: MET     Problem: Safety  Goal: Will remain free from injury  Outcome: MET  Goal: Will remain free from falls  Outcome: MET     Problem: Infection  Goal: Will remain free from infection  Outcome: MET     Problem: Venous Thromboembolism (VTW)/Deep Vein Thrombosis (DVT) Prevention:  Goal: Patient will participate in Venous Thrombosis (VTE)/Deep Vein Thrombosis (DVT)Prevention Measures  Outcome: MET     Problem: Bowel/Gastric:  Goal: Normal bowel function is maintained or improved  Outcome: MET  Goal: Will not experience complications related to bowel motility  Outcome: MET     Problem: Knowledge Deficit  Goal: Knowledge of disease process/condition, treatment plan, diagnostic tests, and medications will improve  Outcome: MET  Goal: Knowledge of the prescribed therapeutic regimen will improve  Outcome: MET     Problem: Discharge Barriers/Planning  Goal: Patient's continuum of care needs will be met  Outcome: MET     Problem: Pain Management  Goal: Pain level will decrease to patient's comfort goal  Outcome: MET     Problem: Respiratory:  Goal: Respiratory status will improve  Outcome: MET     Problem: Fluid Volume:  Goal: Will maintain balanced intake and output  Outcome: MET     Problem: Urinary Elimination:  Goal: Ability to reestablish a normal urinary elimination pattern will improve  Outcome: MET     Problem: Skin Integrity  Goal: Risk for impaired skin integrity will decrease  Outcome: MET     Problem: Safety - Medical Restraint  Goal: Remains free of injury from restraints (Restraint for Interference with Medical Device)  Description  INTERVENTIONS:  1. Determine that other, less restrictive measures have been tried or would not be effective before applying the restraint  2. Evaluate the patient's condition at the time of restraint application  3. Inform patient/family regarding the reason for  restraint  4. Q2H: Monitor safety, psychosocial status, comfort, nutrition and hydration  Outcome: MET  Goal: Free from restraint(s) (Restraint for Interference with Medical Device)  Description  INTERVENTIONS:  1. ONCE/SHIFT or MINIMUM Q12H: Assess and document the continuing need for restraints  2. Q24H: Continued use of restraint requires LIP to perform face to face examination and written order  3. Identify and implement measures to help patient regain control  Outcome: MET

## 2020-02-02 NOTE — DISCHARGE SUMMARY
Discharge Summary    CHIEF COMPLAINT ON ADMISSION  Chief Complaint   Patient presents with   • Arm Swelling   • Arm Pain   • Shortness of Breath       Reason for Admission  Left hand pain     Admission Date  1/28/2020    CODE STATUS  Prior    HPI & HOSPITAL COURSE  This is a 89 y.o. male here with above medical issues.  Patient presented with arm swelling arm pain as a result of a scratch from his cat and resulting mild cellulitis.  He was initially placed on IV Unasyn as well as IV Zyvox and this was transitioned to IV Ancef after consultation with infectious disease Dr. Anthony.  Improved.    Additionally patient was found to have acute on chronic systolic congestive heart failure with underlying severe aortic stenosis as well as CKD stage III-IV.  He was placed on judicious IV Lasix and I consulted cardiology.  Given the patient is 89 years old and has progressive underlying symptoms the recommendation was hospice.  Family agreed with this and this was set up.    Additionally the patient had a supratherapeutic INR and his Coumadin was held during this entire admission and on discharge his INR was 3.22.  I recommended the patient likely stay off Coumadin for hospice but this is up to the hospice physician to the side.  In any situation he can get a repeat INR on Monday to see if they can be resumed at that time.  He had no evidence of overt bleeding.   see below     Therefore, he is discharged in fair and stable condition to hospice.    The patient met 2-midnight criteria for an inpatient stay at the time of discharge.    Discharge Date  2/1/2020    FOLLOW UP ITEMS POST DISCHARGE  Follow-up with the hospice physician in 2 days    DISCHARGE DIAGNOSES  Active Problems:    Essential hypertension POA: Yes    Permanent atrial fibrillation POA: Yes    Chronic anticoagulation POA: Yes    Severe aortic stenosis POA: Yes      Overview: October 2018: Echocardiogram with normal LV size, moderate concentric LVH,       LVEF  60%. Trace MR, moderate AS (peak 40mmHg, mean 23mmHg), no AI.       Moderate TR. RVSP 45mmHg.    Acute congestive heart failure (HCC) POA: Yes    Cellulitis of left hand POA: Yes    Moderate mitral regurgitation POA: Yes    Stage 4 chronic kidney disease (HCC) POA: Yes  Resolved Problems:    Delirium POA: Yes      FOLLOW UP  Future Appointments   Date Time Provider Department Center   2/4/2020  1:20 PM TEETEE VacaP.RFIDEL [facility]   2/10/2020 10:45 AM Zeina Orourke P.A.-C. [facility] None   6/11/2020 12:45 PM PACER CHECK-CAM B [facility] None   6/11/2020  1:15 PM Bentley Marshall M.D. [facility] None   6/29/2020 11:20 AM TEETEE VacaP.R.JAMIR [facility]     JEREMIAH Vaca.P.R.NNiecy  [address]  [place] [address]  [phone]          [facility]      Need medication adjustments and INR check on Monday, 2/3/2020      MEDICATIONS ON DISCHARGE     Medication List      START taking these medications      Instructions   amoxicillin-clavulanate 250-125 MG Tabs  Commonly known as:  AUGMENTIN   Take 1 Tab by mouth every 12 hours for 3 days.  Dose:  1 Tab        CONTINUE taking these medications      Instructions   DILTIAZem  MG Cp24  Commonly known as:  CARDIZEM CD   Take 1 Cap by mouth every day.  Dose:  240 mg     furosemide 20 MG Tabs  Commonly known as:  LASIX   Take 40 mg by mouth every day.  Dose:  40 mg     Magnesium 400 MG Tabs   Take 400 mg by mouth every day.  Dose:  400 mg     potassium chloride SA 20 MEQ Tbcr  Commonly known as:  Kdur   Take 1 Tab by mouth every day.  Dose:  20 mEq     Vitamin D3 125 MCG (5000 UT) Tabs   Take 5,000 Units by mouth every day at 6 PM.  Dose:  5,000 Units        STOP taking these medications    cephALEXin 500 MG Caps  Commonly known as:  KEFLEX     warfarin 5 MG Tabs  Commonly known as:  COUMADIN            Allergies  Allergies   Allergen Reactions   • Iodine      Reports family hx       DIET  regular    ACTIVITY  As tolerated.  Weight bearing as  tolerated    CONSULTATIONS  Cardiology and palliative care and hospice    PROCEDURES  CT-HEAD W/O   Final Result      1.  Cerebral atrophy.      2.  White matter lucencies most consistent with small vessel ischemic change versus demyelination or gliosis. 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      3.  Otherwise, Head CT without contrast with no acute findings. No evidence of acute cerebral infarction, hemorrhage or mass lesion.      DX-CHEST-PORTABLE (1 VIEW)   Final Result         Small pleural effusions with bibasilar atelectasis, left more than right.      US-EXTREMITY VENOUS UPPER UNILAT LEFT   Final Result          LABORATORY  Lab Results   Component Value Date    SODIUM 144 02/01/2020    POTASSIUM 3.8 02/01/2020    CHLORIDE 104 02/01/2020    CO2 29 02/01/2020    GLUCOSE 105 (H) 02/01/2020    BUN 55 (H) 02/01/2020    CREATININE 2.20 (H) 02/01/2020        Lab Results   Component Value Date    WBC 10.2 01/29/2020    HEMOGLOBIN 14.3 01/29/2020    HEMATOCRIT 43.0 01/29/2020    PLATELETCT 163 (L) 01/29/2020        Total time of the discharge process exceeds 43 minutes.

## 2020-02-28 ENCOUNTER — TELEPHONE (OUTPATIENT)
Dept: VASCULAR LAB | Facility: MEDICAL CENTER | Age: 85
End: 2020-02-28

## 2020-03-05 ENCOUNTER — PATIENT MESSAGE (OUTPATIENT)
Dept: HEALTH INFORMATION MANAGEMENT | Facility: OTHER | Age: 85
End: 2020-03-05

## 2020-03-11 ENCOUNTER — ANTICOAGULATION MONITORING (OUTPATIENT)
Dept: VASCULAR LAB | Facility: MEDICAL CENTER | Age: 85
End: 2020-03-11

## 2020-03-11 ENCOUNTER — TELEPHONE (OUTPATIENT)
Dept: VASCULAR LAB | Facility: MEDICAL CENTER | Age: 85
End: 2020-03-11

## 2020-03-11 DIAGNOSIS — Z79.01 CHRONIC ANTICOAGULATION: ICD-10-CM

## 2020-03-11 DIAGNOSIS — Z86.73 HISTORY OF TIA (TRANSIENT ISCHEMIC ATTACK) AND STROKE: ICD-10-CM

## 2020-03-11 DIAGNOSIS — I48.21 PERMANENT ATRIAL FIBRILLATION (HCC): ICD-10-CM

## 2020-03-11 NOTE — TELEPHONE ENCOUNTER
[facility]    Pt's daughter called to update our clinic that the pt has passed away.  I expressed our condolences and informed the pt I will update his chart.     Isiah Cast, MarionD

## 2020-03-13 DIAGNOSIS — Z79.899 MEDICATION MANAGEMENT: ICD-10-CM

## 2020-03-13 RX ORDER — WARFARIN SODIUM 5 MG/1
TABLET ORAL
Qty: 135 TAB | Refills: 1 | OUTPATIENT
Start: 2020-03-13

## 2021-07-11 NOTE — PROGRESS NOTES
Anticoagulation Summary  As of 2019    INR goal:   2.0-3.0   TTR:   79.6 % (3.7 y)   INR used for dosin.59 (2019)   Warfarin maintenance plan:   7.5 mg (5 mg x 1.5) every Wed; 5 mg (5 mg x 1) all other days   Weekly warfarin total:   37.5 mg   No change documented:   Rod Grewal Ass't   Plan last modified:   Ibeth Romero, PharmD (2018)   Next INR check:   4/3/2019   Target end date:   Indefinite    Indications    Paroxysmal atrial fibrillation (HCC) [I48.0]  Hx-TIA (transient ischemic attack) [Z86.73]             Anticoagulation Episode Summary     INR check location:   Outside Lab    Preferred lab:       Send INR reminders to:       Comments:   [phone]      Anticoagulation Care Providers     Provider Role Specialty Phone number    Taz Jc M.D. Referring Cardiology [phone]    Jean Ayala, PharmD Responsible          Anticoagulation Patient Findings  Patient Findings     Negatives:   Signs/symptoms of thrombosis, Signs/symptoms of bleeding, Laboratory test error suspected, Change in health, Change in alcohol use, Change in activity, Upcoming invasive procedure, Emergency department visit, Upcoming dental procedure, Missed doses, Extra doses, Change in medications, Change in diet/appetite, Hospital admission, Bruising, Other complaints      Left voicemail message to report 2.59 therapeutic INR of 2.0-3.0.  Patient to continue with current warfarin dosing regimen. Requested pt contact the clinic for any change to diet or medication, and to report any signs or symptoms of bleeding, bruising or clotting.  Pt to follow up in 8 weeks.  Zeina Figueroa Med Ass't  I have reviewed and concur with the above plan on 2019.  Saira Bazan, Clinical Pharmacist    
light meconium